# Patient Record
Sex: FEMALE | Race: WHITE | URBAN - METROPOLITAN AREA
[De-identification: names, ages, dates, MRNs, and addresses within clinical notes are randomized per-mention and may not be internally consistent; named-entity substitution may affect disease eponyms.]

---

## 2017-12-16 ENCOUNTER — TRANSFERRED RECORDS (OUTPATIENT)
Dept: HEALTH INFORMATION MANAGEMENT | Facility: CLINIC | Age: 27
End: 2017-12-16

## 2017-12-16 ENCOUNTER — HOSPITAL ENCOUNTER (INPATIENT)
Facility: CLINIC | Age: 27
LOS: 6 days | Discharge: HOME | End: 2017-12-22
Attending: PSYCHIATRY & NEUROLOGY | Admitting: PSYCHIATRY & NEUROLOGY
Payer: MEDICAID

## 2017-12-16 ENCOUNTER — APPOINTMENT (OUTPATIENT)
Dept: GENERAL RADIOLOGY | Facility: CLINIC | Age: 27
End: 2017-12-16
Attending: STUDENT IN AN ORGANIZED HEALTH CARE EDUCATION/TRAINING PROGRAM
Payer: MEDICAID

## 2017-12-16 ENCOUNTER — ALLIED HEALTH/NURSE VISIT (OUTPATIENT)
Dept: NEUROLOGY | Facility: CLINIC | Age: 27
End: 2017-12-16
Attending: PSYCHIATRY & NEUROLOGY
Payer: MEDICAID

## 2017-12-16 DIAGNOSIS — F19.20: ICD-10-CM

## 2017-12-16 DIAGNOSIS — J15.4 STREPTOCOCCAL PNEUMONIA (H): ICD-10-CM

## 2017-12-16 DIAGNOSIS — G40.901 STATUS EPILEPTICUS, GENERALIZED CONVULSIVE (H): Primary | ICD-10-CM

## 2017-12-16 DIAGNOSIS — G47.00 PERSISTENT DISORDER OF INITIATING OR MAINTAINING SLEEP: ICD-10-CM

## 2017-12-16 DIAGNOSIS — G40.901 STATUS EPILEPTICUS, GENERALIZED CONVULSIVE (H): ICD-10-CM

## 2017-12-16 DIAGNOSIS — J96.21 ACUTE ON CHRONIC RESPIRATORY FAILURE WITH HYPOXIA (H): Primary | ICD-10-CM

## 2017-12-16 LAB
ANION GAP SERPL CALCULATED.3IONS-SCNC: 10 MMOL/L (ref 3–14)
APTT PPP: 25 SEC (ref 22–37)
BUN SERPL-MCNC: 9 MG/DL (ref 7–30)
CALCIUM SERPL-MCNC: 7.7 MG/DL (ref 8.5–10.1)
CHLORIDE SERPL-SCNC: 109 MMOL/L (ref 94–109)
CO2 SERPL-SCNC: 21 MMOL/L (ref 20–32)
CREAT SERPL-MCNC: 0.61 MG/DL (ref 0.52–1.04)
ERYTHROCYTE [DISTWIDTH] IN BLOOD BY AUTOMATED COUNT: 12.7 % (ref 10–15)
GFR SERPL CREATININE-BSD FRML MDRD: >90 ML/MIN/1.7M2
GLUCOSE SERPL-MCNC: 98 MG/DL (ref 70–99)
HBA1C MFR BLD: 5.1 % (ref 4.3–6)
HCT VFR BLD AUTO: 37 % (ref 35–47)
HGB BLD-MCNC: 12.6 G/DL (ref 11.7–15.7)
INR PPP: 1.04 (ref 0.86–1.14)
MCH RBC QN AUTO: 31.8 PG (ref 26.5–33)
MCHC RBC AUTO-ENTMCNC: 34.1 G/DL (ref 31.5–36.5)
MCV RBC AUTO: 93 FL (ref 78–100)
PLATELET # BLD AUTO: 226 10E9/L (ref 150–450)
POTASSIUM SERPL-SCNC: 3.5 MMOL/L (ref 3.4–5.3)
RBC # BLD AUTO: 3.96 10E12/L (ref 3.8–5.2)
SODIUM SERPL-SCNC: 140 MMOL/L (ref 133–144)
TROPONIN I SERPL-MCNC: <0.015 UG/L (ref 0–0.04)
WBC # BLD AUTO: 16.8 10E9/L (ref 4–11)

## 2017-12-16 PROCEDURE — 94002 VENT MGMT INPAT INIT DAY: CPT

## 2017-12-16 PROCEDURE — 85610 PROTHROMBIN TIME: CPT | Performed by: STUDENT IN AN ORGANIZED HEALTH CARE EDUCATION/TRAINING PROGRAM

## 2017-12-16 PROCEDURE — 85730 THROMBOPLASTIN TIME PARTIAL: CPT | Performed by: STUDENT IN AN ORGANIZED HEALTH CARE EDUCATION/TRAINING PROGRAM

## 2017-12-16 PROCEDURE — 93005 ELECTROCARDIOGRAM TRACING: CPT

## 2017-12-16 PROCEDURE — 87640 STAPH A DNA AMP PROBE: CPT | Performed by: STUDENT IN AN ORGANIZED HEALTH CARE EDUCATION/TRAINING PROGRAM

## 2017-12-16 PROCEDURE — 83036 HEMOGLOBIN GLYCOSYLATED A1C: CPT | Performed by: STUDENT IN AN ORGANIZED HEALTH CARE EDUCATION/TRAINING PROGRAM

## 2017-12-16 PROCEDURE — 86900 BLOOD TYPING SEROLOGIC ABO: CPT | Performed by: STUDENT IN AN ORGANIZED HEALTH CARE EDUCATION/TRAINING PROGRAM

## 2017-12-16 PROCEDURE — 80048 BASIC METABOLIC PNL TOTAL CA: CPT | Performed by: STUDENT IN AN ORGANIZED HEALTH CARE EDUCATION/TRAINING PROGRAM

## 2017-12-16 PROCEDURE — 40000275 ZZH STATISTIC RCP TIME EA 10 MIN

## 2017-12-16 PROCEDURE — 25000128 H RX IP 250 OP 636: Performed by: STUDENT IN AN ORGANIZED HEALTH CARE EDUCATION/TRAINING PROGRAM

## 2017-12-16 PROCEDURE — 40000986 XR ABDOMEN PORT F1 VW

## 2017-12-16 PROCEDURE — 86850 RBC ANTIBODY SCREEN: CPT | Performed by: STUDENT IN AN ORGANIZED HEALTH CARE EDUCATION/TRAINING PROGRAM

## 2017-12-16 PROCEDURE — 87641 MR-STAPH DNA AMP PROBE: CPT | Performed by: STUDENT IN AN ORGANIZED HEALTH CARE EDUCATION/TRAINING PROGRAM

## 2017-12-16 PROCEDURE — 95951 ZZHC EEG VIDEO < 12 HR: CPT | Mod: 52,ZF

## 2017-12-16 PROCEDURE — 25000125 ZZHC RX 250: Performed by: STUDENT IN AN ORGANIZED HEALTH CARE EDUCATION/TRAINING PROGRAM

## 2017-12-16 PROCEDURE — 20000004 ZZH R&B ICU UMMC

## 2017-12-16 PROCEDURE — 5A1945Z RESPIRATORY VENTILATION, 24-96 CONSECUTIVE HOURS: ICD-10-PCS | Performed by: PSYCHIATRY & NEUROLOGY

## 2017-12-16 PROCEDURE — 36415 COLL VENOUS BLD VENIPUNCTURE: CPT | Performed by: STUDENT IN AN ORGANIZED HEALTH CARE EDUCATION/TRAINING PROGRAM

## 2017-12-16 PROCEDURE — 84484 ASSAY OF TROPONIN QUANT: CPT | Performed by: STUDENT IN AN ORGANIZED HEALTH CARE EDUCATION/TRAINING PROGRAM

## 2017-12-16 PROCEDURE — 86901 BLOOD TYPING SEROLOGIC RH(D): CPT | Performed by: STUDENT IN AN ORGANIZED HEALTH CARE EDUCATION/TRAINING PROGRAM

## 2017-12-16 PROCEDURE — 93010 ELECTROCARDIOGRAM REPORT: CPT | Mod: 77 | Performed by: INTERNAL MEDICINE

## 2017-12-16 PROCEDURE — 85027 COMPLETE CBC AUTOMATED: CPT | Performed by: STUDENT IN AN ORGANIZED HEALTH CARE EDUCATION/TRAINING PROGRAM

## 2017-12-16 RX ORDER — PROPOFOL 10 MG/ML
5-75 INJECTION, EMULSION INTRAVENOUS CONTINUOUS
Status: DISCONTINUED | OUTPATIENT
Start: 2017-12-16 | End: 2017-12-16

## 2017-12-16 RX ORDER — HEPARIN SODIUM 5000 [USP'U]/.5ML
5000 INJECTION, SOLUTION INTRAVENOUS; SUBCUTANEOUS EVERY 12 HOURS
Status: DISCONTINUED | OUTPATIENT
Start: 2017-12-16 | End: 2017-12-22 | Stop reason: HOSPADM

## 2017-12-16 RX ORDER — PROPOFOL 10 MG/ML
INJECTION, EMULSION INTRAVENOUS
Status: DISCONTINUED
Start: 2017-12-16 | End: 2017-12-21 | Stop reason: HOSPADM

## 2017-12-16 RX ORDER — POTASSIUM CHLORIDE 29.8 MG/ML
20 INJECTION INTRAVENOUS
Status: DISCONTINUED | OUTPATIENT
Start: 2017-12-16 | End: 2017-12-22 | Stop reason: HOSPADM

## 2017-12-16 RX ORDER — POTASSIUM CL/LIDO/0.9 % NACL 10MEQ/0.1L
10 INTRAVENOUS SOLUTION, PIGGYBACK (ML) INTRAVENOUS
Status: DISCONTINUED | OUTPATIENT
Start: 2017-12-16 | End: 2017-12-22 | Stop reason: HOSPADM

## 2017-12-16 RX ORDER — NALOXONE HYDROCHLORIDE 0.4 MG/ML
.1-.4 INJECTION, SOLUTION INTRAMUSCULAR; INTRAVENOUS; SUBCUTANEOUS
Status: DISCONTINUED | OUTPATIENT
Start: 2017-12-16 | End: 2017-12-22 | Stop reason: HOSPADM

## 2017-12-16 RX ORDER — MAGNESIUM SULFATE HEPTAHYDRATE 40 MG/ML
4 INJECTION, SOLUTION INTRAVENOUS EVERY 4 HOURS PRN
Status: DISCONTINUED | OUTPATIENT
Start: 2017-12-16 | End: 2017-12-22 | Stop reason: HOSPADM

## 2017-12-16 RX ORDER — PROPOFOL 10 MG/ML
5-50 INJECTION, EMULSION INTRAVENOUS CONTINUOUS
Status: DISCONTINUED | OUTPATIENT
Start: 2017-12-16 | End: 2017-12-19

## 2017-12-16 RX ORDER — LABETALOL HYDROCHLORIDE 5 MG/ML
10 INJECTION, SOLUTION INTRAVENOUS EVERY 10 MIN PRN
Status: DISCONTINUED | OUTPATIENT
Start: 2017-12-16 | End: 2017-12-22 | Stop reason: HOSPADM

## 2017-12-16 RX ORDER — AMOXICILLIN 250 MG
1 CAPSULE ORAL AT BEDTIME
Status: DISCONTINUED | OUTPATIENT
Start: 2017-12-16 | End: 2017-12-22 | Stop reason: HOSPADM

## 2017-12-16 RX ORDER — LEVETIRACETAM 15 MG/ML
1500 INJECTION INTRAVASCULAR EVERY 12 HOURS
Status: DISCONTINUED | OUTPATIENT
Start: 2017-12-16 | End: 2017-12-21

## 2017-12-16 RX ORDER — POTASSIUM CHLORIDE 7.45 MG/ML
10 INJECTION INTRAVENOUS
Status: DISCONTINUED | OUTPATIENT
Start: 2017-12-16 | End: 2017-12-22 | Stop reason: HOSPADM

## 2017-12-16 RX ORDER — POTASSIUM CHLORIDE 1.5 G/1.58G
20-40 POWDER, FOR SOLUTION ORAL
Status: DISCONTINUED | OUTPATIENT
Start: 2017-12-16 | End: 2017-12-22 | Stop reason: HOSPADM

## 2017-12-16 RX ORDER — POTASSIUM CHLORIDE 750 MG/1
20-40 TABLET, EXTENDED RELEASE ORAL
Status: DISCONTINUED | OUTPATIENT
Start: 2017-12-16 | End: 2017-12-22 | Stop reason: HOSPADM

## 2017-12-16 RX ORDER — SODIUM CHLORIDE 9 MG/ML
INJECTION, SOLUTION INTRAVENOUS CONTINUOUS
Status: DISCONTINUED | OUTPATIENT
Start: 2017-12-16 | End: 2017-12-22 | Stop reason: HOSPADM

## 2017-12-16 RX ADMIN — PANTOPRAZOLE SODIUM 40 MG: 40 INJECTION, POWDER, FOR SOLUTION INTRAVENOUS at 20:27

## 2017-12-16 RX ADMIN — PROPOFOL 75 MCG/KG/MIN: 10 INJECTION, EMULSION INTRAVENOUS at 22:40

## 2017-12-16 RX ADMIN — LEVETIRACETAM 1500 MG: 1500 INJECTION, SOLUTION INTRAVENOUS at 20:17

## 2017-12-16 RX ADMIN — PROPOFOL 75 MCG/KG/MIN: 10 INJECTION, EMULSION INTRAVENOUS at 20:17

## 2017-12-16 RX ADMIN — SODIUM CHLORIDE: 9 INJECTION, SOLUTION INTRAVENOUS at 20:17

## 2017-12-16 RX ADMIN — HEPARIN SODIUM 5000 UNITS: 5000 INJECTION, SOLUTION INTRAVENOUS; SUBCUTANEOUS at 20:27

## 2017-12-16 ASSESSMENT — VISUAL ACUITY
OU: OTHER (SEE COMMENT)

## 2017-12-16 NOTE — IP AVS SNAPSHOT
[address] [address]    Phone:  [phone]                                       After Visit Summary   12/16/2017    Bobby Howard    MRN: [id number]           After Visit Summary Signature Page     I have received my discharge instructions, and my questions have been answered. I have discussed any challenges I see with this plan with the nurse or doctor.    ..........................................................................................................................................  Patient/Patient Representative Signature      ..........................................................................................................................................  Patient Representative Print Name and Relationship to Patient    ..................................................               ................................................  Date                                            Time    ..........................................................................................................................................  Reviewed by Signature/Title    ...................................................              ..............................................  Date                                                            Time

## 2017-12-16 NOTE — IP AVS SNAPSHOT
MRN:[id number]                      After Visit Summary   12/16/2017    oBbby Howard    MRN: [id number]           Thank you!     Thank you for choosing [facility] for your care. Our goal is always to provide you with excellent care. Hearing back from our patients is one way we can continue to improve our services. Please take a few minutes to complete the written survey that you may receive in the mail after you visit with us. Thank you!        Patient Information     Date Of Birth          1990        Designated Caregiver       Most Recent Value    Caregiver    Will someone help with your care after discharge? yes    Name of designated caregiver father Johnson    Phone number of caregiver [phone]    Caregiver address [address] [place] [address]      About your hospital stay     You were admitted on:  December 16, 2017 You last received care in the:  [address] [facility]    You were discharged on:  December 22, 2017        Reason for your hospital stay       Status Epilepticus                  Who to Call     For medical emergencies, please call 911.  For non-urgent questions about your medical care, please call your primary care provider or clinic, None          Attending Provider     Provider Specialty    Iglesia Her MD Neurology    [facility], Tyrone Holly MD Neurology       Primary Care Provider Fax #    Physician No Ref-Primary [phone]      After Care Instructions     Activity       Your activity upon discharge: activity as tolerated, but NO DRIVING for three months.            Diet       Follow this diet upon discharge: Orders Placed This Encounter      Regular Diet Adult            Discharge Instructions       Per [place] State Law, you may not drive for three months following a seizure. For you, that would be March 16, 2018. See your discharge summary for more details.                  Follow-up Appointments     [facility] Follow-up and recommended labs and tests        1. Follow up with your PCP within one week.  2. Follow up with neurology in 1-3 months.     Appointments on [place] and/or [facility] (with [facility] or [facility] provider or service). Call [phone] if you haven't heard regarding these appointments within 7 days of discharge.                  Additional Services     INTEGRATED PRIMARY CARE REFERRAL       Your provider has referred you to: Integrated Primary Care  [facility] - Integrated Primary Care serves adults 18 years and older that have complex medical issues with a mental health overlay that are difficult to manage well in a traditional primary care setting. Patients referred to IPC meet at least one of the following criteria:    - Complex medical issues with a mental health overlay  - Patients who are difficult to manage in the traditional primary care setting due to mental health issues    Referrals to Integrated [facility] (IPC) assume that the referring provider has discussed with the patient that the initial visit will be an establish care visit and Integrated [facility] will take over all primary care services at that visit. The current primary care provider will need to provide care for the patient until the patient has had their first visit at [facility].      Please complete the following questions to help us determine if your patient qualifies for our program:    Is your patient dealing with chronic pain? No    Is your patient on chronic narcotics? No    Does your patient have any chemical health issues? Yes.  If yes, have they been in a treatment program  No    Is your patient currently receiving psychiatric care? No    I have discussed this referral with my patient and they are agreeable to transfer their primary care to Integrated Primary Care. YES.    Please be aware that coverage of these services is subject to the terms and limitations of your health insurance plan.  Call member services at your health plan with any benefit or coverage  "questions.      Please bring the following to your appointment:  >>   Any x-rays, CTs or MRIs which have been performed.  Contact the facility where they were done to arrange for  prior to your scheduled appointment.  Any new CT, MRI or other procedures ordered by your specialist must be performed at a [place] facility or coordinated by your clinic's referral office.    >>   List of current medications   >>   This referral request   >>   Any documents/labs given to you for this referral            Neurology Adult Referral                 Pending Results     Date and Time Order Name Status Description    12/18/2017 1319 Urine Culture Aerobic Bacterial Preliminary     12/18/2017 1246 Blood culture Preliminary     12/18/2017 1246 Blood culture Preliminary     12/17/2017 1334 Cysticercosis antibody IgG CSF: Tube 3 In process     12/17/2017 1334 Fungus Culture, non-blood Preliminary     12/17/2017 1334 Anaerobic CSF culture Preliminary             Statement of Approval     Ordered          12/22/17 1132  I have reviewed and agree with all the recommendations and orders detailed in this document.  EFFECTIVE NOW     Approved and electronically signed by:  Patti Benjamin DO             Admission Information     Date & Time Provider Department Dept. Phone    12/16/2017 Tyrone Villafana MD [address] [facility] [phone]      Your Vitals Were     Blood Pressure Pulse Temperature Respirations Height Weight    115/71 80 97.8  F (36.6  C) (Oral) 16 1.626 m (5' 4\") 61 kg (134 lb 7.7 oz)    Pulse Oximetry BMI (Body Mass Index)                97% 23.08 kg/m2          MyChart Information     [facility] lets you send messages to your doctor, view your test results, renew your prescriptions, schedule appointments and more. To sign up, go to [email or web address] . Click on \"Log in\" on the left side of the screen, which will take you to the Welcome page. Then click on \"Sign up Now\" on the right side of " the page.     You will be asked to enter the access code listed below, as well as some personal information. Please follow the directions to create your username and password.     Your access code is: [email or web address]  Expires: 3/16/2018 10:28 PM     Your access code will  in 90 days. If you need help or a new code, please call your [facility] clinic or [phone].        Care EveryWhere ID     This is your Care EveryWhere ID. This could be used by other organizations to access your [facility] medical records  [id number]        Equal Access to Services     [facility]: Hadii dick barreto hadevelia Daniel, wakendy connolly, qarobson kaaljerry zimmer, shana ruiz . So [facility] [phone].    ATENCIÓN: Si habla español, tiene a hairtson disposición servicios gratuitos de asistencia lingüística. Llame al [phone].    We comply with applicable federal civil rights laws and [place] laws. We do not discriminate on the basis of race, color, national origin, age, disability, sex, sexual orientation, or gender identity.               Review of your medicines      START taking        Dose / Directions    azithromycin 250 MG tablet   Commonly known as:  ZITHROMAX   Used for:  Streptococcal pneumonia (H)        Two tablets first day, then one tablet daily for four days.   Quantity:  3 tablet   Refills:  0       folic acid 1 MG tablet   Commonly known as:  FOLVITE   Used for:  Polysubstance dependence including opioid type drug with complication, continuous use (H)        Dose:  1 mg   Take 1 tablet (1 mg) by mouth daily   Quantity:  30 tablet   Refills:  0       levETIRAcetam 750 MG tablet   Commonly known as:  KEPPRA        Dose:  1500 mg   Take 2 tablets (1,500 mg) by mouth 2 times daily   Quantity:  60 tablet   Refills:  0       melatonin 3 MG tablet   Used for:  Persistent disorder of initiating or maintaining sleep        Dose:  3 mg   1 tablet (3 mg) by Oral or Feeding Tube route At Bedtime    Quantity:  30 tablet   Refills:  0       multivitamin, therapeutic Tabs tablet   Used for:  Polysubstance dependence including opioid type drug with complication, continuous use (H)        Dose:  1 tablet   Take 1 tablet by mouth daily   Quantity:  30 tablet   Refills:  0       thiamine 100 MG tablet   Used for:  Polysubstance dependence including opioid type drug with complication, continuous use (H)        Dose:  100 mg   Take 1 tablet (100 mg) by mouth daily   Quantity:  30 tablet   Refills:  0            Where to get your medicines      These medications were sent to [facility] - [place], [place] - [address]  [address] [address]     Phone:  [phone]     azithromycin 250 MG tablet    folic acid 1 MG tablet    levETIRAcetam 750 MG tablet    melatonin 3 MG tablet    multivitamin, therapeutic Tabs tablet    thiamine 100 MG tablet               ANTIBIOTIC INSTRUCTION     You've Been Prescribed an Antibiotic - Now What?  Your healthcare team thinks that you or your loved one might have an infection. Some infections can be treated with antibiotics, which are powerful, life-saving drugs. Like all medications, antibiotics have side effects and should only be used when necessary. There are some important things you should know about your antibiotic treatment.      Your healthcare team may run tests before you start taking an antibiotic.    Your team may take samples (e.g., from your blood, urine or other areas) to run tests to look for bacteria. These test can be important to determine if you need an antibiotic at all and, if you do, which antibiotic will work best.      Within a few days, your healthcare team might change or even stop your antibiotic.    Your team may start you on an antibiotic while they are working to find out what is making you sick.    Your team might change your antibiotic because test results show that a different antibiotic would be better to  treat your infection.    In some cases, once your team has more information, they learn that you do not need an antibiotic at all. They may find out that you don't have an infection, or that the antibiotic you're taking won't work against your infection. For example, an infection caused by a virus can't be treated with antibiotics. Staying on an antibiotic when you don't need it is more likely to be harmful than helpful.      You may experience side effects from your antibiotic.    Like all medications, antibiotics have side effects. Some of these can be serious.    Let you healthcare team know if you have any known allergies when you are admitted to the hospital.    One significant side effect of nearly all antibiotics is the risk of severe and sometimes deadly diarrhea caused by Clostridium difficile (C. Difficile). This occurs when a person takes antibiotics because some good germs are destroyed. Antibiotic use allows C. diificile to take over, putting patients at high risk for this serious infection.    As a patient or caregiver, it is important to understand your or your loved one's antibiotic treatment. It is especially important for caregivers to speak up when patients can't speak for themselves. Here are some important questions to ask your healthcare team.    What infection is this antibiotic treating and how do you know I have that infection?    What side effects might occur from this antibiotic?    How long will I need to take this antibiotic?    Is it safe to take this antibiotic with other medications or supplements (e.g., vitamins) that I am taking?     Are there any special directions I need to know about taking this antibiotic? For example, should I take it with food?    How will I be monitored to know whether my infection is responding to the antibiotic?    What tests may help to make sure the right antibiotic is prescribed for me?      Information provided by:  www.cdc.gov/getsmart  U.S. Department  of Health and Human Services  Centers for disease Control and Prevention  National Center for Emerging and Zoonotic Infectious Diseases  Division of Healthcare Quality Promotion         Protect others around you: Learn how to safely use, store and throw away your medicines at www.disposemymeds.org.             Medication List: This is a list of all your medications and when to take them. Check marks below indicate your daily home schedule. Keep this list as a reference.      Medications           Morning Afternoon Evening Bedtime As Needed    azithromycin 250 MG tablet   Commonly known as:  ZITHROMAX   Two tablets first day, then one tablet daily for four days.                                folic acid 1 MG tablet   Commonly known as:  FOLVITE   Take 1 tablet (1 mg) by mouth daily   Last time this was given:  1 mg on 12/22/2017  8:12 AM                                levETIRAcetam 750 MG tablet   Commonly known as:  KEPPRA   Take 2 tablets (1,500 mg) by mouth 2 times daily   Last time this was given:  1,500 mg on 12/22/2017  8:12 AM                                melatonin 3 MG tablet   1 tablet (3 mg) by Oral or Feeding Tube route At Bedtime   Last time this was given:  3 mg on 12/21/2017  7:57 PM                                multivitamin, therapeutic Tabs tablet   Take 1 tablet by mouth daily   Last time this was given:  1 tablet on 12/22/2017  8:12 AM                                thiamine 100 MG tablet   Take 1 tablet (100 mg) by mouth daily   Last time this was given:  100 mg on 12/22/2017  8:12 AM

## 2017-12-16 NOTE — MR AVS SNAPSHOT
After Visit Summary   12/16/2017    Bobby Howard    MRN: [id number]           Patient Information     Date Of Birth          1990        Visit Information        Provider Department      12/16/2017 7:00 PM [facility] EEG TECH 4 [facility]        Today's Diagnoses     Status epilepticus, generalized convulsive (H)    -  1       Follow-ups after your visit        Your next 10 appointments already scheduled     Dec 17, 2017  7:00 AM CST   24 Hour Video Visit with [facility] EEG TECH 4   [facility] ([facility])    [facility]  [address] [id number] [phone]           [place]: Your appointment is scheduled at [facility]. [address] [place] [address]              Future tests that were ordered for you today     Open Standing Orders        Priority Remaining Interval Expires Ordered    Phosphorus Routine 100/100 CONDITIONAL (SPECIFY)  12/16/2017    Potassium Routine 100/100 CONDITIONAL (SPECIFY)  12/16/2017    Magnesium Routine 100/100 CONDITIONAL (SPECIFY)  12/16/2017    Daily weights Routine 1/1 DAILY  12/16/2017    Platelet count Timed 6/6 EVERY 72 HOURS  12/16/2017            Who to contact     Please call your clinic at [phone] to:    Ask questions about your health    Make or cancel appointments    Discuss your medicines    Learn about your test results    Speak to your doctor   If you have compliments or concerns about an experience at your clinic, or if you wish to file a complaint, please contact [facility] Patient Relations at [phone] or email us at [email or web address]         Additional Information About Your Visit        MyChart Information     [facility] is an electronic gateway that provides easy, online access to your medical records. With [facility], you can request a clinic appointment, read your test results, renew a prescription or communicate with  your care team.     To sign up for MyChart visit the website at [email or web address]   You will be asked to enter the access code listed below, as well as some personal information. Please follow the directions to create your username and password.     Your access code is: [email or web address]  Expires: 3/16/2018 10:28 PM     Your access code will  in 90 days. If you need help or a new code, please contact your [facility] or call [phone] for assistance.        Care EveryWhere ID     This is your Care EveryWhere ID. This could be used by other organizations to access your [facility] medical records  [id number]         Blood Pressure from Last 3 Encounters:   17 116/67    Weight from Last 3 Encounters:   17 63.5 kg (139 lb 15.9 oz)              Today, you had the following     No orders found for display         Today's Medication Changes      Notice     This visit is during an admission. Changes to the med list made in this visit will be reflected in the After Visit Summary of the admission.             Primary Care Provider    None Specified       No primary provider on file.        Equal Access to Services     [facility] : Hadbalaji Daniel, sabrina connolly, nelly zimmer, shana ruiz . So [facility] [phone].    ATENCIÓN: Si habla español, tiene a hairston disposición servicios gratuitos de asistencia lingüística. Llame al [phone].    We comply with applicable federal civil rights laws and [place] laws. We do not discriminate on the basis of race, color, national origin, age, disability, sex, sexual orientation, or gender identity.            Thank you!     Thank you for choosing [facility]  for your care. Our goal is always to provide you with excellent care. Hearing back from our patients is one way we can continue to improve our services. Please take a few minutes to complete the written survey that you may  receive in the mail after your visit with us. Thank you!             Your Updated Medication List - Protect others around you: Learn how to safely use, store and throw away your medicines at www.disposemymeds.org.      Notice     This visit is during an admission. Changes to the med list made in this visit will be reflected in the After Visit Summary of the admission.

## 2017-12-17 ENCOUNTER — APPOINTMENT (OUTPATIENT)
Dept: MRI IMAGING | Facility: CLINIC | Age: 27
End: 2017-12-17
Attending: INTERNAL MEDICINE
Payer: MEDICAID

## 2017-12-17 ENCOUNTER — ALLIED HEALTH/NURSE VISIT (OUTPATIENT)
Dept: NEUROLOGY | Facility: CLINIC | Age: 27
End: 2017-12-17
Attending: PSYCHIATRY & NEUROLOGY
Payer: MEDICAID

## 2017-12-17 DIAGNOSIS — R56.9 SEIZURES (H): Primary | ICD-10-CM

## 2017-12-17 LAB
ABO + RH BLD: NORMAL
APPEARANCE CSF: CLEAR
BASE DEFICIT BLDA-SCNC: 0.7 MMOL/L
BLD GP AB SCN SERPL QL: NORMAL
BLOOD BANK CMNT PATIENT-IMP: NORMAL
BLOOD BANK CMNT PATIENT-IMP: NORMAL
CHOLEST SERPL-MCNC: 142 MG/DL
CK SERPL-CCNC: 268 U/L (ref 30–225)
COLOR CSF: COLORLESS
CRYPTOC AG SPEC QL: NORMAL
EV RNA SPEC QL NAA+PROBE: NEGATIVE
GLUCOSE BLDC GLUCOMTR-MCNC: 99 MG/DL (ref 70–99)
GLUCOSE CSF-MCNC: 61 MG/DL (ref 40–70)
GRAM STN SPEC: NORMAL
GRAM STN SPEC: NORMAL
HAEM INFLU A AG SPEC QL: NORMAL
HCO3 BLD-SCNC: 22 MMOL/L (ref 21–28)
HDLC SERPL-MCNC: 34 MG/DL
INDIA INK PREP SPEC: NORMAL
LDLC SERPL CALC-MCNC: 71 MG/DL
MRSA DNA SPEC QL NAA+PROBE: NEGATIVE
N MEN SG A+Y AG SPEC QL LA: NORMAL
N MEN SG B+E COLI K1 AG SPEC QL LA: NORMAL
N MEN SG C+W135 AG SPEC QL LA: NORMAL
NONHDLC SERPL-MCNC: 109 MG/DL
O2/TOTAL GAS SETTING VFR VENT: 40 %
PCO2 BLD: 29 MM HG (ref 35–45)
PH BLD: 7.49 PH (ref 7.35–7.45)
PLATELET # BLD AUTO: 197 10E9/L (ref 150–450)
PLATELET # BLD AUTO: NORMAL 10E9/L (ref 150–450)
PO2 BLD: 75 MM HG (ref 80–105)
PROT CSF-MCNC: 33 MG/DL (ref 15–60)
RBC # CSF MANUAL: 5 /UL (ref 0–2)
RBC # CSF MANUAL: NORMAL /UL (ref 0–2)
SPECIMEN EXP DATE BLD: NORMAL
SPECIMEN EXP DATE BLD: NORMAL
SPECIMEN SOURCE: NORMAL
TRIGL SERPL-MCNC: 188 MG/DL
TUBE # CSF: 4 #
WBC # CSF MANUAL: 3 /UL (ref 0–5)
WBC # CSF MANUAL: NORMAL /UL (ref 0–5)

## 2017-12-17 PROCEDURE — 87205 SMEAR GRAM STAIN: CPT | Performed by: INTERNAL MEDICINE

## 2017-12-17 PROCEDURE — 86403 PARTICLE AGGLUT ANTBDY SCRN: CPT | Performed by: INTERNAL MEDICINE

## 2017-12-17 PROCEDURE — 94003 VENT MGMT INPAT SUBQ DAY: CPT

## 2017-12-17 PROCEDURE — 25000132 ZZH RX MED GY IP 250 OP 250 PS 637: Performed by: STUDENT IN AN ORGANIZED HEALTH CARE EDUCATION/TRAINING PROGRAM

## 2017-12-17 PROCEDURE — 89050 BODY FLUID CELL COUNT: CPT | Performed by: INTERNAL MEDICINE

## 2017-12-17 PROCEDURE — 82040 ASSAY OF SERUM ALBUMIN: CPT | Performed by: INTERNAL MEDICINE

## 2017-12-17 PROCEDURE — 20000004 ZZH R&B ICU UMMC

## 2017-12-17 PROCEDURE — 25000125 ZZHC RX 250: Performed by: STUDENT IN AN ORGANIZED HEALTH CARE EDUCATION/TRAINING PROGRAM

## 2017-12-17 PROCEDURE — 87075 CULTR BACTERIA EXCEPT BLOOD: CPT | Performed by: INTERNAL MEDICINE

## 2017-12-17 PROCEDURE — 85049 AUTOMATED PLATELET COUNT: CPT | Performed by: PSYCHIATRY & NEUROLOGY

## 2017-12-17 PROCEDURE — 86789 WEST NILE VIRUS ANTIBODY: CPT | Performed by: INTERNAL MEDICINE

## 2017-12-17 PROCEDURE — 87581 M.PNEUMON DNA AMP PROBE: CPT | Performed by: INTERNAL MEDICINE

## 2017-12-17 PROCEDURE — 84157 ASSAY OF PROTEIN OTHER: CPT | Performed by: INTERNAL MEDICINE

## 2017-12-17 PROCEDURE — 36415 COLL VENOUS BLD VENIPUNCTURE: CPT | Performed by: STUDENT IN AN ORGANIZED HEALTH CARE EDUCATION/TRAINING PROGRAM

## 2017-12-17 PROCEDURE — 95951 ZZHC EEG VIDEO EACH 24 HR: CPT | Mod: ZF

## 2017-12-17 PROCEDURE — 84478 ASSAY OF TRIGLYCERIDES: CPT | Performed by: PSYCHIATRY & NEUROLOGY

## 2017-12-17 PROCEDURE — 25000128 H RX IP 250 OP 636: Performed by: STUDENT IN AN ORGANIZED HEALTH CARE EDUCATION/TRAINING PROGRAM

## 2017-12-17 PROCEDURE — 86682 HELMINTH ANTIBODY: CPT | Performed by: INTERNAL MEDICINE

## 2017-12-17 PROCEDURE — 87210 SMEAR WET MOUNT SALINE/INK: CPT | Performed by: INTERNAL MEDICINE

## 2017-12-17 PROCEDURE — 25000128 H RX IP 250 OP 636: Performed by: RADIOLOGY

## 2017-12-17 PROCEDURE — 87529 HSV DNA AMP PROBE: CPT | Performed by: INTERNAL MEDICINE

## 2017-12-17 PROCEDURE — 70553 MRI BRAIN STEM W/O & W/DYE: CPT

## 2017-12-17 PROCEDURE — 82550 ASSAY OF CK (CPK): CPT | Performed by: STUDENT IN AN ORGANIZED HEALTH CARE EDUCATION/TRAINING PROGRAM

## 2017-12-17 PROCEDURE — 87498 ENTEROVIRUS PROBE&REVRS TRNS: CPT | Performed by: INTERNAL MEDICINE

## 2017-12-17 PROCEDURE — 86901 BLOOD TYPING SEROLOGIC RH(D): CPT | Performed by: PSYCHIATRY & NEUROLOGY

## 2017-12-17 PROCEDURE — 25000128 H RX IP 250 OP 636

## 2017-12-17 PROCEDURE — 80061 LIPID PANEL: CPT | Performed by: STUDENT IN AN ORGANIZED HEALTH CARE EDUCATION/TRAINING PROGRAM

## 2017-12-17 PROCEDURE — 40000275 ZZH STATISTIC RCP TIME EA 10 MIN

## 2017-12-17 PROCEDURE — 87070 CULTURE OTHR SPECIMN AEROBIC: CPT | Performed by: INTERNAL MEDICINE

## 2017-12-17 PROCEDURE — 36415 COLL VENOUS BLD VENIPUNCTURE: CPT | Performed by: PSYCHIATRY & NEUROLOGY

## 2017-12-17 PROCEDURE — 86788 WEST NILE VIRUS AB IGM: CPT | Performed by: INTERNAL MEDICINE

## 2017-12-17 PROCEDURE — 27210429 ZZH NUTRITION PRODUCT INTERMEDIATE LITER

## 2017-12-17 PROCEDURE — 87798 DETECT AGENT NOS DNA AMP: CPT | Performed by: INTERNAL MEDICINE

## 2017-12-17 PROCEDURE — 87102 FUNGUS ISOLATION CULTURE: CPT | Performed by: INTERNAL MEDICINE

## 2017-12-17 PROCEDURE — 82042 OTHER SOURCE ALBUMIN QUAN EA: CPT | Performed by: INTERNAL MEDICINE

## 2017-12-17 PROCEDURE — 87799 DETECT AGENT NOS DNA QUANT: CPT | Performed by: INTERNAL MEDICINE

## 2017-12-17 PROCEDURE — 82803 BLOOD GASES ANY COMBINATION: CPT

## 2017-12-17 PROCEDURE — 82784 ASSAY IGA/IGD/IGG/IGM EACH: CPT | Performed by: INTERNAL MEDICINE

## 2017-12-17 PROCEDURE — 82945 GLUCOSE OTHER FLUID: CPT | Performed by: INTERNAL MEDICINE

## 2017-12-17 PROCEDURE — 009U3ZX DRAINAGE OF SPINAL CANAL, PERCUTANEOUS APPROACH, DIAGNOSTIC: ICD-10-PCS | Performed by: PSYCHIATRY & NEUROLOGY

## 2017-12-17 PROCEDURE — 36600 WITHDRAWAL OF ARTERIAL BLOOD: CPT

## 2017-12-17 PROCEDURE — 83916 OLIGOCLONAL BANDS: CPT | Performed by: INTERNAL MEDICINE

## 2017-12-17 PROCEDURE — A9585 GADOBUTROL INJECTION: HCPCS | Performed by: RADIOLOGY

## 2017-12-17 PROCEDURE — 87899 AGENT NOS ASSAY W/OPTIC: CPT | Performed by: INTERNAL MEDICINE

## 2017-12-17 PROCEDURE — 00000146 ZZHCL STATISTIC GLUCOSE BY METER IP

## 2017-12-17 PROCEDURE — 86900 BLOOD TYPING SEROLOGIC ABO: CPT | Performed by: PSYCHIATRY & NEUROLOGY

## 2017-12-17 RX ORDER — GADOBUTROL 604.72 MG/ML
0.1 INJECTION INTRAVENOUS ONCE
Status: COMPLETED | OUTPATIENT
Start: 2017-12-17 | End: 2017-12-17

## 2017-12-17 RX ORDER — LIDOCAINE HYDROCHLORIDE 10 MG/ML
INJECTION, SOLUTION EPIDURAL; INFILTRATION; INTRACAUDAL; PERINEURAL
Status: DISCONTINUED
Start: 2017-12-17 | End: 2017-12-21 | Stop reason: HOSPADM

## 2017-12-17 RX ADMIN — FENTANYL CITRATE 25 MCG/HR: 50 INJECTION, SOLUTION INTRAMUSCULAR; INTRAVENOUS at 01:02

## 2017-12-17 RX ADMIN — PROPOFOL 70 MCG/KG/MIN: 10 INJECTION, EMULSION INTRAVENOUS at 02:43

## 2017-12-17 RX ADMIN — PROPOFOL 50 MCG/KG/MIN: 10 INJECTION, EMULSION INTRAVENOUS at 21:33

## 2017-12-17 RX ADMIN — LEVETIRACETAM 1500 MG: 1500 INJECTION, SOLUTION INTRAVENOUS at 20:30

## 2017-12-17 RX ADMIN — PROPOFOL 75 MCG/KG/MIN: 10 INJECTION, EMULSION INTRAVENOUS at 09:06

## 2017-12-17 RX ADMIN — HEPARIN SODIUM 5000 UNITS: 5000 INJECTION, SOLUTION INTRAVENOUS; SUBCUTANEOUS at 09:09

## 2017-12-17 RX ADMIN — PROPOFOL 65 MCG/KG/MIN: 10 INJECTION, EMULSION INTRAVENOUS at 05:05

## 2017-12-17 RX ADMIN — LEVETIRACETAM 1500 MG: 1500 INJECTION, SOLUTION INTRAVENOUS at 09:29

## 2017-12-17 RX ADMIN — HEPARIN SODIUM 5000 UNITS: 5000 INJECTION, SOLUTION INTRAVENOUS; SUBCUTANEOUS at 20:30

## 2017-12-17 RX ADMIN — SENNOSIDES AND DOCUSATE SODIUM 1 TABLET: 8.6; 5 TABLET ORAL at 22:33

## 2017-12-17 RX ADMIN — GADOBUTROL 6 ML: 604.72 INJECTION INTRAVENOUS at 15:16

## 2017-12-17 RX ADMIN — PANTOPRAZOLE SODIUM 40 MG: 40 INJECTION, POWDER, FOR SOLUTION INTRAVENOUS at 09:09

## 2017-12-17 ASSESSMENT — ACTIVITIES OF DAILY LIVING (ADL)
COGNITION: 1 - ATTENTION OR MEMORY DEFICITS
AMBULATION: 0-->INDEPENDENT
BATHING: 0-->INDEPENDENT
FALL_HISTORY_WITHIN_LAST_SIX_MONTHS: NO
RETIRED_COMMUNICATION: 2-->DIFFICULTY UNDERSTANDING (NOT RELATED TO LANGUAGE BARRIER)
SWALLOWING: 0-->SWALLOWS FOODS/LIQUIDS WITHOUT DIFFICULTY
TRANSFERRING: 0-->INDEPENDENT
DRESS: 0-->INDEPENDENT
RETIRED_EATING: 0-->INDEPENDENT
TOILETING: 0-->INDEPENDENT

## 2017-12-17 ASSESSMENT — VISUAL ACUITY
OU: OTHER (SEE COMMENT)

## 2017-12-17 NOTE — PLAN OF CARE
"Problem: Patient Care Overview  Goal: Plan of Care/Patient Progress Review  Status: intermittently combative and restless overnight; sedated at some points.  D/I: Patient on unit [address] Surgical/Neuro ICU s/p: multiple seizures and trips to 2 other outside hospitals before admit here 12/1617 for continuoud EEG.  Neuro- patient was very combative and restless prior to getting fentanyl added. Currently, sedated on propofol @65 & fentanyl @25. Bilateral wrist restraints to protect airway. Moves extremities spontaneously but not to command. Pupils equal and reactive. Continuous EEG. Hourly neuros.   CV- VSS, tachycardic overnight. Afebrile but patient was intermittently sweating overnight.   Pulm- lung sounds clear. Vent on CMV: fio2 40%, rate 16, PEEP 5, and tidal volume 450.   GI/- frausto placed at outside hospital; intact. OG imaged and ok to use; to low intermittent suction.   Skin- intact; tattoos and holes from piercing's.  Gtts- ns@75, propofol @65, and fentanyl @25.  Pain- fentanyl added; father saying patient has \"high pain tolerance\" and has been abusing drugs.   Electrolytes- replaced per protocol.  See flow sheets for further interventions and assessments.  P: Continue to monitor pt closely, Notify MD of changes/concerns.         "

## 2017-12-17 NOTE — PROGRESS NOTES
EEG CLINICAL NEUROPHYSIOLOGY PRELIMINARY REPORT    First 30 minutes of video-EEG reviewed. On propofol 75 ug/kg-min. Continuous diffuse alpha and beta with intermixed slowing. Poorly reactive. No periodic activity or ongoing seizures.    Results thus far consistent with level of anesthetic drips employed. Cannot exclude additional cerebral dysfunction separate from anesthetic effects. No malignant patterns, seizures, or evidence of status epilepticus thus far. Will continue to monitor. Full report to follow.    Kulwinder Ambrose MD  Pager [phone]

## 2017-12-17 NOTE — PLAN OF CARE
Problem: Patient Care Overview  Goal: Plan of Care/Patient Progress Review  OT/4AB:  OT orders received and acknowledged.  Per discussion with RN, pt not medically appropriate this AM for PT/OT evaluation.  Pt intubated and sedated, and becoming increasingly agitated with weaning of sedation.  Plan to check back this PM as schedule permits, otherwise reschedule.

## 2017-12-17 NOTE — MR AVS SNAPSHOT
After Visit Summary   12/17/2017    Bobby Howard    MRN: [id number]           Patient Information     Date Of Birth          1990        Visit Information        Provider Department      12/17/2017 7:00 AM [facility] EEG TECH 4 [facility] EEG        Today's Diagnoses     Seizures (H)    -  1       Follow-ups after your visit        Your next 10 appointments already scheduled     Dec 18, 2017  7:00 AM CST   24 Hour Video Visit with [facility] EEG TECH 4   [facility] EEG ([facility])    [facility]  [address] [id number] [phone]           [place]: Your appointment is scheduled at [facility]. [address] [place] [address]              Future tests that were ordered for you today     Open Standing Orders        Priority Remaining Interval Expires Ordered    CK total Routine 1/1 AM DRAW  12/17/2017    Triglycerides Routine 1/1 AM DRAW  12/17/2017    Phosphorus Routine 100/100 CONDITIONAL (SPECIFY)  12/16/2017    Potassium Routine 100/100 CONDITIONAL (SPECIFY)  12/16/2017    Magnesium Routine 100/100 CONDITIONAL (SPECIFY)  12/16/2017    Platelet count Timed 5/6 EVERY 72 HOURS  12/16/2017            Who to contact     Please call your clinic at [phone] to:    Ask questions about your health    Make or cancel appointments    Discuss your medicines    Learn about your test results    Speak to your doctor   If you have compliments or concerns about an experience at your clinic, or if you wish to file a complaint, please contact [facility] Patient Relations at [phone] or email us at [email or web address]         Additional Information About Your Visit        MyChart Information     Corpora is an electronic gateway that provides easy, online access to your medical records. With Corpora, you can request a clinic appointment, read your test results, renew a prescription or  communicate with your care team.     To sign up for [employer] visit the website at [email or web address]   You will be asked to enter the access code listed below, as well as some personal information. Please follow the directions to create your username and password.     Your access code is: [email or web address]  Expires: 3/16/2018 10:28 PM     Your access code will  in 90 days. If you need help or a new code, please contact your [facility] or call [phone] for assistance.        Care EveryWhere ID     This is your Care EveryWhere ID. This could be used by other organizations to access your [place] medical records  [id number]         Blood Pressure from Last 3 Encounters:   17 114/60    Weight from Last 3 Encounters:   17 63.5 kg (139 lb 15.9 oz)              Today, you had the following     No orders found for display         Today's Medication Changes      Notice     This visit is during an admission. Changes to the med list made in this visit will be reflected in the After Visit Summary of the admission.             Primary Care Provider    None Specified       No primary provider on file.        Equal Access to Services     RADHA RESENDEZ : Hadbalaji Daniel, sabrina connolly, nelly zimmer, shana ruiz . So [facility] [phone].    ATENCIÓN: Si habla español, tiene a hairston disposición servicios gratuitos de asistencia lingüística. Llame al [phone].    We comply with applicable federal civil rights laws and [place] laws. We do not discriminate on the basis of race, color, national origin, age, disability, sex, sexual orientation, or gender identity.            Thank you!     Thank you for choosing [facility]  for your care. Our goal is always to provide you with excellent care. Hearing back from our patients is one way we can continue to improve our services. Please take a few minutes to complete the written  survey that you may receive in the mail after your visit with us. Thank you!             Your Updated Medication List - Protect others around you: Learn how to safely use, store and throw away your medicines at www.disposemymeds.org.      Notice     This visit is during an admission. Changes to the med list made in this visit will be reflected in the After Visit Summary of the admission.

## 2017-12-17 NOTE — PLAN OF CARE
Problem: Patient Care Overview  Goal: Plan of Care/Patient Progress Review  SLP consult received. The patient is currently intubated and not appropriate to participate with SLP. SLP is signing off on the order, please reconsult when extubated and appropriate to participate.

## 2017-12-17 NOTE — PROCEDURES
EEG#:  -1      DATE OF STUDY:  12/16/2017       TYPE OF STUDY:  Inpatient video EEG monitoring.      DURATION OF RECORDING:  3 hours 7 minutes 8 seconds.      HISTORY:  Day 1 of video EEG monitoring in patient, Bobby Howard, a 27-year-old admitted for ongoing treatment of her status epilepticus.  She is referred from [facility] where status epilepticus apparently was reportedly diagnosed and treated.  She presents on propofol drip.  Additionally, she is being treated with levetiracetam 7500 mg per day.        TECHNICAL SUMMARY:  EEG is recorded from scalp electrodes applied according to the 10-20 international system.  Additional electrodes are utilized for referencing, artifact detection, and recording of other cerebral regions.  Video was continuously recorded.  Video was reviewed for clinical correlation and to assist with EEG interpretation.      FINDINGS:  Propofol 75 mcg/kilogram per minute was administered throughout.  Near continuous 18 Hz activity is seen diffusely but predominates in the central regions.  There is underlying irregular delta activity.  Stimulation is performed following ICU protocol.  This does cause some transient attenuations when physical stimulation is delivered (21:02:43).  Somewhat more delta is also seen when this occurs.  More passive sorts of stimulation did not have this effect.  Normal features of sleep were not recorded.        OTHER INTERICTAL ABNORMALITIES:  No epileptiform discharges.      ICTAL ABNORMALITIES:  No electrographic seizures.      IMPRESSION:  Abnormal.  A diffuse beta pattern with underlying delta is noted, consistent with a moderate to severe diffuse encephalopathy.  Administration of propofol at the rates used in this study often produces a pattern of this sort in patients without neurologic injury.  Underlying cerebral dysfunction separate from anesthetic effects cannot to be excluded, but malignant patterns are not seen.  Epileptiform activity  or seizures were not seen either but propofol at the doses employed here will often suppress both seizures and epileptiform activity.         HELLEN BUCKNER MD             D: 2017 14:35   T: 2017 16:12   MT: JATIN      Name:     NAEEM OLIVARES   MRN:      [id number]        Account:        [id number]   :      1990           Procedure Date: 2017      Document: [id number]

## 2017-12-17 NOTE — PLAN OF CARE
Problem: Patient Care Overview  Goal: Plan of Care/Patient Progress Review  PT 4A: PT orders received. Per discussion with RN, pt not appropriate for therapies this morning, agitated when off of sedation. PT will check back as time allows or reschedule for tomorrow.

## 2017-12-17 NOTE — PROGRESS NOTES
"[facility]    NEUROCRITICAL CARE PROGRESS NOTE    Patient Name:  Bobby Howard       Date of Admission:  12/16/2017       Problem List    Active Hospital Problems    Status epilepticus, generalized convulsive (H)       PMH:  Polysubstance abuse- methamphetamine and opioids  Status epilepticus  Rapidly progressive cognitive problems    Past Surgical History   No past surgical history on file.    Family History   No family history on file.    Social History   Social History     Social History     Marital status: N/A     Spouse name: N/A     Number of children: N/A     Years of education: N/A     Occupational History     Not on file.     Social History Main Topics     Smoking status: Not on file     Smokeless tobacco: Not on file     Alcohol use Not on file     Drug use: Not on file     Sexual activity: Not on file     Other Topics Concern     Not on file     Social History Narrative       Allergies   No Known Allergies       Brief summary of hospital stay to date:      27 year old with long history of polysubstance abuse, presented with status epilepticus and was transferred from [facility] on 12/16 for continuous EEG monitoring. unknown history, was on methamphetamine \"binge\" earlier this year and moved in with her father over the last few months. Dad describes rapidly progressive cognitive deficits over the last 3 months. She has been having history of staring spells and has had 5 generalized convulsive seizures on 12/16 while on the way from home to [facility]. Notably the patient was given atracurium by EMS during her 3rd seizure and received propofol only after her 5 th seizure ( she arrived at [facility] ER by that time).     Present Medications    pantoprazole (PROTONIX) IV PEDS/NICU  40 mg Intravenous Daily     levETIRAcetam  1,500 mg Intravenous Q12H     propofol         heparin  5,000 Units Subcutaneous Q12H     senna-docusate  1 tablet Oral or Feeding Tube At Bedtime     propofol     "       NaCl 75 mL/hr at 17     propofol (DIPRIVAN) infusion 65 mcg/kg/min (17 0505)     fentaNYL 25 mcg/hr (17 0500)       EXAMINATION    Vital Signs  /59  Temp 98.9  F (37.2  C) (Axillary)  Resp 16  Wt 63.5 kg (139 lb 15.9 oz)  SpO2 96%    Tmax: Temp (24hrs), Av.7  F (37.1  C), Min:98.6  F (37  C), Max:98.9  F (37.2  C)      Intake/Output:   Intake/Output Summary (Last 24 hours) at 17 0509  Last data filed at 17 0500   Gross per 24 hour   Intake          1366.98 ml   Output             1375 ml   Net            -8.02 ml         Nutrition   Active Diet Order      NPO for Medical/Clinical Reasons Except for: No Exceptions    NeuroCritical Neurologic Examination  Cranial nerve examination:   Pupils constricted. Cough present. VOR present. Face symmetric.  Motor: Moves all extremities equally  Reflexes: equal all over.    Cardiovascular:  RRR  Pulmonary:  no respiratory distress  Abdominal:  soft, non-tender  Extremities:  no edema, peripheral pulses palpable    Laboratory Findings    Data     CMP   Recent Labs  Lab 17      POTASSIUM 3.5   CHLORIDE 109   CO2 21   ANIONGAP 10   GLC 98   BUN 9   CR 0.61   GFRESTIMATED >90   GFRESTBLACK >90   TALA 7.7*        CBC   Recent Labs  Lab 17   WBC 16.8*   RBC 3.96   HGB 12.6   HCT 37.0   MCV 93   MCH 31.8   MCHC 34.1   RDW 12.7          INR, PTT   Recent Labs  Lab 17   INR 1.04   PTT 25        Arterial Blood Gas No lab results found in last 7 days.    UA  No lab results found in last 7 days.     UDS  at [facility] negative.    Micro   Recent Labs  Lab 17   CLAUDIA Flores          Radiological Data  Data   Recent Results (from the past 48 hour(s))   XR Abdomen Port 1 View    Narrative    XR ABDOMEN PORT F1 VW 2017 12:37 AM    History: OG placement;     Comparison: None.    Findings: Endotracheal tube in the mid intrathoracic trachea. Gastric  tube with tip and sidehole  projecting over the stomach. Nonobstructive  bowel gas pattern. No pneumatosis or portal venous gas. Streaky  opacities in the lung bases.      Impression    Impression: Gastric tube with tip and sidehole projecting over the  stomach.        Ventilation Mode: CMV/AC  FiO2 (%): 40 %  Rate Set (breaths/minute): 16 breaths/min  Tidal Volume Set (mL): 450 mL  PEEP (cm H2O): 5 cmH2O  Oxygen Concentration (%): 40 %  Resp: 16      ASSESSMENT AND PLAN      # Neuro  Status epilepticus- unknown etiology.  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  MRI and LP pending.  Continuous EEG.  Keppra 1500 mg bid.  Continue propofol for today. Currently on 75 mcg/kg/min. Will start predecex later on this evening and wean off fentanyl and propofol.    # CVS:  Stable, SBP < 140    # Resp:  Acute hypoxic respiratory failure 2/2 status  Intubated.    # GI:   NG tube- start tube feeds tonight.  Stable, bowel regimen    # Endocrine: Stable, no issues.    # Heme/ID: Leukocytosis- monitor.  pan culture if fever.    # Renal:   Strict I/O.  Electrolyte replacement protocol, stable.    Diet: NPO for now.  DVT ppx: Heparin  IVF: NS at 75 cc/hr    Full Code       Case discussed with attending: Dr. Taina GARCIA  Vascular Neurology fellow  5:10 AM December 17, 2017

## 2017-12-17 NOTE — PLAN OF CARE
Problem: Restraint for Non-Violent/Non-Self-Destructive Behavior  Goal: Prevent/Manage Potential Problems  Maintain safety of patient and others during period of restraint.  Promote psychological and physical wellbeing.  Prevent injury to skin and involved body parts.  Promote nutrition, hydration, and elimination.   Outcome: No Change  Patient has bilateral soft wrist restraints to protect airway and prevent harm to self. MD order to use for one day and to reassess. Skin assessed every 2 hours, frausto catheter emptied every 2 hours, and patient kept NPO. Will continue restraints due to patient being intermittently restless and labile. Will continue to monitor and assess need for restraints.

## 2017-12-17 NOTE — PROGRESS NOTES
EEG CLINICAL NEUROPHYSIOLOGY PRELIMINARY REPORT    Video-EEG through 0600 today reviewed. Continues on propofol 75 ug/kg-min throughout. EEG with low amplitude irregular delta with superimposed continuous 10-15 Hz activity. Poorly reactive. No periodic discharges or seizures.    EEG recording continues consistent with anesthetic doses employed. Cerebral dysfunction separate from anesthetic effects cannot be excluded but malignant patterns not seen. No ongoing seizures. Full report to follow.    Kulwinder Ambrose MD  Pager [phone]

## 2017-12-17 NOTE — H&P
"[facility]  Neuro Critical Care Service  Neuro-ICU H+P     Admission Date: 12/16/2017  Date of Service:12/16/2017  Hospital Day: 1  History of Present Illness   -Patient is a 27-year-old female with history of polysubstance abuse, markedly opioids and methamphetamine who presents to hospital status epilepticus. History is primarily obtained through chart review and outside records is known is available currently taking interview and the patient is intubated and sedated. Patient apparently has a long significant history of substance abuse and was on a methamphetamine \"binge\" earlier this year and recently moved in with her father over the last few months. The father believes that the patient has been sober, however he notes that her intelligence has dramatically decreased over the last 3 months. It is unclear at this point whether this is progressive. Over the last several weeks she has had episodes of staring spells but denies being overtly sick. Father notes that she has been severely depressed over the last month. Today, she did have a generalized tonic-clonic seizure. The length of the time is unknown but she did have 3 more spells and was transferred to an outside hospital. At the outside hospital she was loaded with Keppra and Versed and intubation was attempted, but failed. She did have a fourth seizure at the outside hospital and was transferred to [facility] for further care. While en route to [facility], she had a fifth seizure which prompted paralyzation and subsequent intubation. Per chart review it appears she was given boluses of propofol at [facility] and transferred here for continuous video EEG monitoring.    Assessment and Plan   Neurological:  # Status epilepticus  Unclear etiology at this point, CT head done at the outside hospital appears to have old chronic infarctions and atrophy. This is possibly due to her long-standing history of drug use. Will have further workup likely including " MRI and possible lumbar puncture.   -Continue propofol   -Increase Keppra to 1500 mg b.i.d.   -Will consider loading with fosphenytoin if needed   -Consider MRI   -Consider LP    Respiratory:  #Acute hypoxic Respiratory failure   - Patient is not ready for weaning from neurological standpoint.    Cardiovascular:  #Stable   - SBP goal <140    Endocrine:  # Stable   - SSI, goal blood sugar 100-150    Heme/ID:  # Leukocytosis, likely reactive  Continue to monitor     GI:  # Stable   - bowel regimen     Renal:  # Stable   - sodium goal normonatremia   - strict I&Os   - electrolyte replacement protocols    Skin/Musculoskeletal:  # Stable    Diet: Healthy diet and exercise reviewed.    IVF: NS at 75cc/hr  Bowel ppx: Senna  DVT ppx: Heparin    CODE STATUS: Full Code  ================================================================  Physical Examination:   Vitals: SpO2 98 %.  Tmax: No data recorded.    Vital Signs with Ranges  FiO2 (%):  [40 %] 40 %  SpO2:  [98 %] 98 %     HEENT: moist membranes, no oral lesions  Cardiovascular: RRR, S1/S2, no murmur/rub/gallop  Lungs: both hemithoraces are symmetrical, CTAB, no rales or rhonci  Abdomen: soft, not tender, not distended  Extremities: no clubbing, no cyanosis,  Neuro:  Mental status Intubated and sedated on propofol, does not follow commands.  CN: Oculocephalic intact, no blink to threat, cough intact  Extremities: withdraws to noxious stimuli in all 4 extremities  Reflexes: brisk and symmetric       Medications:   Scheduled Meds:    pantoprazole (PROTONIX) IV PEDS/NICU  40 mg Intravenous Daily     Infusions:    NaCl       PRN Meds:naloxone, labetalol, potassium chloride, potassium chloride, potassium chloride, potassium chloride with lidocaine, potassium chloride, magnesium sulfate, magnesium sulfate, potassium phosphate (KPHOS) in D5W IV, potassium phosphate (KPHOS) in D5W IV, potassium phosphate (KPHOS) in D5W IV, potassium phosphate (KPHOS) in D5W IV, potassium phosphate  ([facility]) in D5W IV       Data:   ROUTINE IP LABS  CBC RESULTS:  Recent Labs   Lab Test  12/16/17 1929   WBC  16.8*   RBC  3.96   HGB  12.6   HCT  37.0   PLT  226     Basic Metabolic Panel:  Recent Labs   Lab Test  12/16/17 1929   NA  140   POTASSIUM  3.5   CHLORIDE  109   CO2  21   BUN  9   CR  0.61   GLC  98   TALA  7.7*     Liver panel:  No lab results found.  INR:  Recent Labs   Lab Test  12/16/17 1929   INR  1.04      Lipid Profile:  No lab results found.  Thyroid Panel:  No lab results found.  A1C:  Recent Labs   Lab Test  12/16/17 1929   A1C  5.1     Troponin I:  Recent Labs   Lab Test  12/16/17 1929   TROPI  <0.015     CPK:  No lab results found.  Ammonia:  No lab results found.  Vitamin B12:  No lab results found.   Vitamin D:  No lab results found.  Most Recent 6 Bacteria Isolates From Any Culture:  No lab results found.     IMAGING:   All imaging studies were reviewed personally.  No results found for this or any previous visit (from the past 24 hour(s)).     Hospital Course: procedure, complication, diagnosis, treatment:   Admission Date: 12/16/2017  Intubation Date: 12/16/2017  Surgeries / Procedures:    Ruel Geiger

## 2017-12-18 ENCOUNTER — APPOINTMENT (OUTPATIENT)
Dept: GENERAL RADIOLOGY | Facility: CLINIC | Age: 27
End: 2017-12-18
Attending: NURSE PRACTITIONER
Payer: MEDICAID

## 2017-12-18 ENCOUNTER — ALLIED HEALTH/NURSE VISIT (OUTPATIENT)
Dept: NEUROLOGY | Facility: CLINIC | Age: 27
End: 2017-12-18
Attending: PSYCHIATRY & NEUROLOGY
Payer: MEDICAID

## 2017-12-18 DIAGNOSIS — R56.9 SEIZURES (H): Primary | ICD-10-CM

## 2017-12-18 LAB
ALBUMIN UR-MCNC: NEGATIVE MG/DL
ANION GAP SERPL CALCULATED.3IONS-SCNC: 12 MMOL/L (ref 3–14)
APPEARANCE UR: CLEAR
BILIRUB UR QL STRIP: NEGATIVE
BUN SERPL-MCNC: 4 MG/DL (ref 7–30)
CALCIUM SERPL-MCNC: 8.4 MG/DL (ref 8.5–10.1)
CHLORIDE SERPL-SCNC: 105 MMOL/L (ref 94–109)
CK SERPL-CCNC: 173 U/L (ref 30–225)
CO2 SERPL-SCNC: 20 MMOL/L (ref 20–32)
COLOR UR AUTO: YELLOW
CREAT SERPL-MCNC: 0.55 MG/DL (ref 0.52–1.04)
ERYTHROCYTE [DISTWIDTH] IN BLOOD BY AUTOMATED COUNT: 12.5 % (ref 10–15)
GFR SERPL CREATININE-BSD FRML MDRD: >90 ML/MIN/1.7M2
GLUCOSE BLDC GLUCOMTR-MCNC: 79 MG/DL (ref 70–99)
GLUCOSE SERPL-MCNC: 106 MG/DL (ref 70–99)
GLUCOSE UR STRIP-MCNC: NEGATIVE MG/DL
GRAM STN SPEC: NORMAL
GRAM STN SPEC: NORMAL
HCT VFR BLD AUTO: 32.8 % (ref 35–47)
HGB BLD-MCNC: 11 G/DL (ref 11.7–15.7)
HGB UR QL STRIP: ABNORMAL
HIV 1+2 AB+HIV1 P24 AG SERPL QL IA: NONREACTIVE
HSV1 DNA CSF QL NAA+PROBE: NOT DETECTED
HSV2 DNA CSF QL NAA+PROBE: NOT DETECTED
INTERPRETATION ECG - MUSE: NORMAL
KETONES UR STRIP-MCNC: >150 MG/DL
LEUKOCYTE ESTERASE UR QL STRIP: ABNORMAL
MAGNESIUM SERPL-MCNC: 1.7 MG/DL (ref 1.6–2.3)
MCH RBC QN AUTO: 31.9 PG (ref 26.5–33)
MCHC RBC AUTO-ENTMCNC: 33.5 G/DL (ref 31.5–36.5)
MCV RBC AUTO: 95 FL (ref 78–100)
MICROBIOLOGIST REVIEW: NORMAL
MUCOUS THREADS #/AREA URNS LPF: PRESENT /LPF
NITRATE UR QL: POSITIVE
PH UR STRIP: 5 PH (ref 5–7)
PHOSPHATE SERPL-MCNC: 2.6 MG/DL (ref 2.5–4.5)
PLATELET # BLD AUTO: 176 10E9/L (ref 150–450)
POTASSIUM SERPL-SCNC: 3.2 MMOL/L (ref 3.4–5.3)
PROCALCITONIN SERPL-MCNC: 0.05 NG/ML
RBC # BLD AUTO: 3.45 10E12/L (ref 3.8–5.2)
RBC #/AREA URNS AUTO: 13 /HPF (ref 0–2)
S PNEUM AG SPEC QL: NORMAL
SODIUM SERPL-SCNC: 138 MMOL/L (ref 133–144)
SOURCE: ABNORMAL
SP GR UR STRIP: 1.01 (ref 1–1.03)
SPECIMEN SOURCE: NORMAL
SPECIMEN SOURCE: NORMAL
SPECIMEN TYPE: NORMAL
TRIGL SERPL-MCNC: 290 MG/DL
UROBILINOGEN UR STRIP-MCNC: NORMAL MG/DL (ref 0–2)
VARICELLA ZOSTER DNA PCR COMMENT: NORMAL
VZV DNA SPEC QL NAA+PROBE: NORMAL
WBC # BLD AUTO: 11.7 10E9/L (ref 4–11)
WBC #/AREA URNS AUTO: 7 /HPF (ref 0–2)

## 2017-12-18 PROCEDURE — 00000146 ZZHCL STATISTIC GLUCOSE BY METER IP

## 2017-12-18 PROCEDURE — 82550 ASSAY OF CK (CPK): CPT | Performed by: PSYCHIATRY & NEUROLOGY

## 2017-12-18 PROCEDURE — 84145 PROCALCITONIN (PCT): CPT | Performed by: NURSE PRACTITIONER

## 2017-12-18 PROCEDURE — 80048 BASIC METABOLIC PNL TOTAL CA: CPT | Performed by: NURSE PRACTITIONER

## 2017-12-18 PROCEDURE — 25000128 H RX IP 250 OP 636

## 2017-12-18 PROCEDURE — 25000132 ZZH RX MED GY IP 250 OP 250 PS 637: Performed by: STUDENT IN AN ORGANIZED HEALTH CARE EDUCATION/TRAINING PROGRAM

## 2017-12-18 PROCEDURE — 40000275 ZZH STATISTIC RCP TIME EA 10 MIN

## 2017-12-18 PROCEDURE — 87088 URINE BACTERIA CULTURE: CPT | Performed by: PSYCHIATRY & NEUROLOGY

## 2017-12-18 PROCEDURE — 84100 ASSAY OF PHOSPHORUS: CPT | Performed by: NURSE PRACTITIONER

## 2017-12-18 PROCEDURE — 87070 CULTURE OTHR SPECIMN AEROBIC: CPT | Performed by: NURSE PRACTITIONER

## 2017-12-18 PROCEDURE — 87040 BLOOD CULTURE FOR BACTERIA: CPT | Performed by: NURSE PRACTITIONER

## 2017-12-18 PROCEDURE — 25000128 H RX IP 250 OP 636: Performed by: STUDENT IN AN ORGANIZED HEALTH CARE EDUCATION/TRAINING PROGRAM

## 2017-12-18 PROCEDURE — 36415 COLL VENOUS BLD VENIPUNCTURE: CPT | Performed by: NURSE PRACTITIONER

## 2017-12-18 PROCEDURE — 27210429 ZZH NUTRITION PRODUCT INTERMEDIATE LITER

## 2017-12-18 PROCEDURE — 94003 VENT MGMT INPAT SUBQ DAY: CPT

## 2017-12-18 PROCEDURE — 25000132 ZZH RX MED GY IP 250 OP 250 PS 637: Performed by: NURSE PRACTITIONER

## 2017-12-18 PROCEDURE — 87077 CULTURE AEROBIC IDENTIFY: CPT | Performed by: NURSE PRACTITIONER

## 2017-12-18 PROCEDURE — 87535 HIV-1 PROBE&REVERSE TRNSCRPJ: CPT | Performed by: INTERNAL MEDICINE

## 2017-12-18 PROCEDURE — 87516 HEPATITIS B DNA AMP PROBE: CPT | Performed by: INTERNAL MEDICINE

## 2017-12-18 PROCEDURE — 40000141 ZZH STATISTIC PERIPHERAL IV START W/O US GUIDANCE

## 2017-12-18 PROCEDURE — 95951 ZZHC EEG VIDEO EACH 24 HR: CPT | Mod: ZF

## 2017-12-18 PROCEDURE — 87205 SMEAR GRAM STAIN: CPT | Performed by: NURSE PRACTITIONER

## 2017-12-18 PROCEDURE — 25000128 H RX IP 250 OP 636: Performed by: PSYCHIATRY & NEUROLOGY

## 2017-12-18 PROCEDURE — 36415 COLL VENOUS BLD VENIPUNCTURE: CPT | Performed by: INTERNAL MEDICINE

## 2017-12-18 PROCEDURE — 36415 COLL VENOUS BLD VENIPUNCTURE: CPT | Performed by: PSYCHIATRY & NEUROLOGY

## 2017-12-18 PROCEDURE — 81001 URINALYSIS AUTO W/SCOPE: CPT | Performed by: NURSE PRACTITIONER

## 2017-12-18 PROCEDURE — 87798 DETECT AGENT NOS DNA AMP: CPT | Performed by: INTERNAL MEDICINE

## 2017-12-18 PROCEDURE — 25000125 ZZHC RX 250: Performed by: STUDENT IN AN ORGANIZED HEALTH CARE EDUCATION/TRAINING PROGRAM

## 2017-12-18 PROCEDURE — 87186 SC STD MICRODIL/AGAR DIL: CPT | Performed by: NURSE PRACTITIONER

## 2017-12-18 PROCEDURE — 87086 URINE CULTURE/COLONY COUNT: CPT | Performed by: PSYCHIATRY & NEUROLOGY

## 2017-12-18 PROCEDURE — 84478 ASSAY OF TRIGLYCERIDES: CPT | Performed by: PSYCHIATRY & NEUROLOGY

## 2017-12-18 PROCEDURE — 83735 ASSAY OF MAGNESIUM: CPT | Performed by: NURSE PRACTITIONER

## 2017-12-18 PROCEDURE — 87521 HEPATITIS C PROBE&RVRS TRNSC: CPT | Performed by: INTERNAL MEDICINE

## 2017-12-18 PROCEDURE — 87389 HIV-1 AG W/HIV-1&-2 AB AG IA: CPT | Performed by: STUDENT IN AN ORGANIZED HEALTH CARE EDUCATION/TRAINING PROGRAM

## 2017-12-18 PROCEDURE — 20000004 ZZH R&B ICU UMMC

## 2017-12-18 PROCEDURE — 71010 XR CHEST PORT 1 VW: CPT

## 2017-12-18 PROCEDURE — 85027 COMPLETE CBC AUTOMATED: CPT | Performed by: PSYCHIATRY & NEUROLOGY

## 2017-12-18 RX ORDER — DEXMEDETOMIDINE HYDROCHLORIDE 4 UG/ML
0.2-0.7 INJECTION, SOLUTION INTRAVENOUS CONTINUOUS
Status: DISCONTINUED | OUTPATIENT
Start: 2017-12-18 | End: 2017-12-20

## 2017-12-18 RX ORDER — ACETAMINOPHEN 325 MG/1
650 TABLET ORAL EVERY 4 HOURS PRN
Status: DISCONTINUED | OUTPATIENT
Start: 2017-12-18 | End: 2017-12-22 | Stop reason: HOSPADM

## 2017-12-18 RX ADMIN — ACETAMINOPHEN 650 MG: 325 TABLET, FILM COATED ORAL at 20:26

## 2017-12-18 RX ADMIN — FENTANYL CITRATE 50 MCG/HR: 50 INJECTION, SOLUTION INTRAMUSCULAR; INTRAVENOUS at 14:10

## 2017-12-18 RX ADMIN — PIPERACILLIN SODIUM AND TAZOBACTAM SODIUM 4.5 G: 36; 4.5 INJECTION, POWDER, LYOPHILIZED, FOR SOLUTION INTRAVENOUS at 20:26

## 2017-12-18 RX ADMIN — HEPARIN SODIUM 5000 UNITS: 5000 INJECTION, SOLUTION INTRAVENOUS; SUBCUTANEOUS at 08:19

## 2017-12-18 RX ADMIN — DEXMEDETOMIDINE HYDROCHLORIDE 0.2 MCG/KG/HR: 4 INJECTION, SOLUTION INTRAVENOUS at 16:09

## 2017-12-18 RX ADMIN — VANCOMYCIN HYDROCHLORIDE 1500 MG: 10 INJECTION, POWDER, LYOPHILIZED, FOR SOLUTION INTRAVENOUS at 14:11

## 2017-12-18 RX ADMIN — SODIUM CHLORIDE: 9 INJECTION, SOLUTION INTRAVENOUS at 14:15

## 2017-12-18 RX ADMIN — SODIUM CHLORIDE: 9 INJECTION, SOLUTION INTRAVENOUS at 04:11

## 2017-12-18 RX ADMIN — PIPERACILLIN SODIUM AND TAZOBACTAM SODIUM 4.5 G: 36; 4.5 INJECTION, POWDER, LYOPHILIZED, FOR SOLUTION INTRAVENOUS at 14:10

## 2017-12-18 RX ADMIN — POTASSIUM CHLORIDE 20 MEQ: 1.5 POWDER, FOR SOLUTION ORAL at 14:10

## 2017-12-18 RX ADMIN — MULTIVITAMIN 15 ML: LIQUID ORAL at 16:09

## 2017-12-18 RX ADMIN — LEVETIRACETAM 1500 MG: 1500 INJECTION, SOLUTION INTRAVENOUS at 20:25

## 2017-12-18 RX ADMIN — PROPOFOL 50 MCG/KG/MIN: 10 INJECTION, EMULSION INTRAVENOUS at 04:11

## 2017-12-18 RX ADMIN — LEVETIRACETAM 1500 MG: 1500 INJECTION, SOLUTION INTRAVENOUS at 08:19

## 2017-12-18 RX ADMIN — SENNOSIDES AND DOCUSATE SODIUM 1 TABLET: 8.6; 5 TABLET ORAL at 21:55

## 2017-12-18 RX ADMIN — Medication 2 G: at 16:12

## 2017-12-18 RX ADMIN — PANTOPRAZOLE SODIUM 40 MG: 40 INJECTION, POWDER, FOR SOLUTION INTRAVENOUS at 08:19

## 2017-12-18 RX ADMIN — ACETAMINOPHEN 650 MG: 325 TABLET, FILM COATED ORAL at 13:05

## 2017-12-18 RX ADMIN — PROPOFOL 30 MCG/KG/MIN: 10 INJECTION, EMULSION INTRAVENOUS at 13:05

## 2017-12-18 RX ADMIN — HEPARIN SODIUM 5000 UNITS: 5000 INJECTION, SOLUTION INTRAVENOUS; SUBCUTANEOUS at 20:26

## 2017-12-18 ASSESSMENT — VISUAL ACUITY
OU: OTHER (SEE COMMENT)

## 2017-12-18 NOTE — CONSULTS
"Social Work: Assessment with Discharge Plan    Patient Name:  Bobby Howard  :  1990  Age:  27 year old  MRN:  [id number]  Risk/Complexity Score:     Completed assessment with:  Patient's family, chart review, attended rounds     Presenting Information   Reason for Referral:  Stroke consult  Date of Intake:  2017  Referral Source:  Consult  Decision Maker:  Patient at baseline.   Alternate Decision Maker:  Per [facility] Next of Kin policy and chart review, pt does not have adult children or a spouse, so BOTH parents would be alternate decision makers.   Health Care Directive:  Not on file. Pt not appropriate to complete at this time.   Living Situation:  House with pt's father. Per father, pt moved from [place] around [facility] to live with him in [place].  Previous Functional Status:  Independent  Patient and family understanding of hospitalization:  Restorative   Cultural/Language/Spiritual Considerations:  Per chart review, pt is Judaism and English-speaking.   Adjustment to Illness:  Pt unable to participate in discussion at this time.    Physical Health  Reason for Admission:  No diagnosis found.  Services Needed/Recommended:  TBD, pending hospital course.     Mental Health/Chemical Dependency  Diagnosis:  No diagnoses listed in chart, but H&P references dad's report that pt \"has been severely depressed over the last month.\" H&P also notes that pt has a \"history of polysubstance abuse, markedly opioids and methamphetamine.\"   Support/Services in Place:  None identified.   Services Needed/Recommended:  TBD.     Support System  Significant relationship at present time:  Unclear, but pt's dad (and his girlfriend), sister and aunt were present during SW visit.   Family of origin is available for support:  Yes   Other support available:  Unknown   Gaps in support system:  Unknown  Patient is caregiver to:  None. Per chart review, pt has a 9, 5 and 3 year old that have been removed from " her custody. Did not discuss this with pt/family.     Provider Information   Primary Care Physician:  No primary care provider on file.   None   Clinic:  No primary physician on file.      :  None identified.     Financial   Income Source:  Pt's father reported that pt is not currently working.   Financial Concerns:  None identified.   Insurance:    Payor/Plan Subscriber Name Rel Member # Group #   [place] NAEEM OLIVARES  [id number]       [address] [address]       Discharge Plan   Patient and family discharge goal:  Did not discuss specifics, but pt's dad thought pt may need some sort of services at d/c and was open to SW involvement with this process, as needed.   Barriers to discharge:  Medical stability     Discharge Recommendations   Anticipated Disposition:  TBD, pending hospital course.  Transportation Needs:  TBD   Name of Transportation Company and Phone:  TBD     Additional comments   Met with pt's dad, sister, aunt and dad's girlfriend at the bedside. Introduced self and role of SW. Obtained much of the above information from chart review, but confirmed a few details with family. Family was pleasant to SW, though did not seem open to engaging much with SW at this time. Allowed them to spend time as a family with pt and provided SW contact info should needs arise. They thanked SW for stopping by.     Kristin Bolaños, DONA, [facility]  ICU Float   Pager: [phone]  [email or web address]     NO LETTER

## 2017-12-18 NOTE — PLAN OF CARE
Problem: Restraint for Non-Violent/Non-Self-Destructive Behavior  Goal: Prevent/Manage Potential Problems  Maintain safety of patient and others during period of restraint.  Promote psychological and physical wellbeing.  Prevent injury to skin and involved body parts.  Promote nutrition, hydration, and elimination.   Outcome: No Change  Wrist restraints still in place dt pulling at ET tube. Will continue to monitor and reassess restraint need PRN.

## 2017-12-18 NOTE — PLAN OF CARE
Problem: Patient Care Overview  Goal: Plan of Care/Patient Progress Review  Outcome: No Change  D: patient admitted for video EEG monitoring after having 3-5 seizures at home and OSH. No evidence of continued seizure activity, keppra continues. Remains intubated. MRI and lumbar puncture done today, awaiting results.     I/A:     Neuro: Opens eyes spontaneously or to light touch. Moving all extremities, restless with lightened sedation or with repositioning. Nodding to family, grimacing and lifting head off bed, sitting forward. Calms with medications boluses. Continues on propofol and fentanyl. Pupils equal and briskly reactive.   Cardiac: SR to sinus tachycardia in 90-100s. No ectopy. BPs stable 100-110s/70s.   Respiratory: Vented, CMV, no PS today, Lungs coarse throughout, moderate amounts of thick tan secretions.   GI: TF ordered for overnight. OG currently to LIS.   : Prasad in place, good UO  ID: Afebrile       P: Continue to monitor, may extubate tomorrow if no seizures with decreased propofol. Notify MD of any acute events. Family at bedside

## 2017-12-18 NOTE — PHARMACY-VANCOMYCIN DOSING SERVICE
Pharmacy Vancomycin Initial Note  Date of Service 2017  Patient's  1990  27 year old, female    Indication: Aspiration Pneumonia    Current estimated CrCl = Estimated Creatinine Clearance: 155 mL/min (based on Cr of 0.55).    Creatinine for last 3 days  2017:  7:29 PM Creatinine 0.61 mg/dL  2017: 12:08 PM Creatinine 0.55 mg/dL    Recent Vancomycin Level(s) for last 3 days  No results found for requested labs within last 72 hours.      Vancomycin IV Administrations (past 72 hours)      No vancomycin orders with administrations in past 72 hours.                Nephrotoxins and other renal medications (Future)    Start     Dose/Rate Route Frequency Ordered Stop    17 0300  vancomycin (VANCOCIN) 1,250 mg in NaCl 0.9 % 250 mL intermittent infusion      1,250 mg  over 90 Minutes Intravenous EVERY 12 HOURS 17 1320      17 1400  piperacillin-tazobactam (ZOSYN) 3.375 in 15 mL NS Premix Syringe      3.375 g  over 3 Minutes Intravenous EVERY 8 HOURS SCHEDULED 17 1304      17 1330  vancomycin (VANCOCIN) 1,500 mg in NaCl 0.9 % 250 mL intermittent infusion      1,500 mg  over 90 Minutes Intravenous ONCE 17 1319            Contrast Orders - past 72 hours (72h ago through future)    Start     Dose/Rate Route Frequency Ordered Stop    17 1500  gadobutrol (GADAVIST) injection 6.53 mL      0.1 mL/kg × 65.3 kg (Dosing Weight) Intravenous ONCE 17 1447 17 1516                Plan:  1.  Start vancomycin  1500 mg iv x 1 then 1250 mg iv q12h   2.  Goal Trough Level: 15-20 mg/L   3.  Pharmacy will check trough levels as appropriate in 1-3 Days.    4. Serum creatinine levels will be ordered daily for the first week of therapy and at least twice weekly for subsequent weeks.    5. Brutus method utilized to dose vancomycin therapy: Method 2    Maine Ann, BeatrisD
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
## 2017-12-18 NOTE — PLAN OF CARE
Problem: Patient Care Overview  Goal: Plan of Care/Patient Progress Review  PT 4A: CANCEL; per RN, pt not extubating this date and remains sedated unless for neuro checks. Will reschedule.

## 2017-12-18 NOTE — PHARMACY
Pharmacy Tube Feeding Consult    Medication reviewed for administration by feeding tube and for potential food/drug interactions.    Recommendation: No changes are needed at this time.     Pharmacy will continue to follow as new medications are ordered.    Maine Ann, BeatrisD

## 2017-12-18 NOTE — PROCEDURES
VIDEO EEG DAY 2      EEG #:  -2      DATE OF RECORDIN2017.      SOURCE FILE DURATION:  23 hours 55 minutes.      PATIENT INFORMATION:  Bobby Howard is a 27-year-old female with a history of polysubstance abuse, presented to the hospital with status epilepticus.  EEG is being done to evaluate for seizures.      MEDICATIONS:  Levetiracetam 1500 mg twice a day and Fentanyl 25-50 mcg per hour.  Propofol 50-75 mcg per kilogram per minute.     TECHNICAL SUMMARY: This video EEG monitoring procedure was performed with 23 scalp electrodes in 10-20 system placements, and additional scalp, precordial and other surface electrodes used for electrical referencing and artifact detection. No electroclinical seizures or any electrographic seizures were seen. Video was reviewed intermittently by EEG technologist and physician for clinical seizures.      EEG FINDING:  The patient has diffuse beta activity up to 20 Hz in frequency.  This is maximally seen in the parasagittal and midline electrodes.  The temporal chain electrodes have a lower amplitude beta activity.  There is underlying polymorphic delta slowing throughout the entire record,  the patient does not have a well-formed parietooccipital, there is no sleep-wake distinction, and there is no sleep architecture that is appreciated.  When patient is stimulated with auditory or painful stimuli, the patient is unresponsive and during this time, EEG does have more mixed frequencies and an increase in amplitude.  I am suggesting the EEG is reactive.      EPILEPTIFORM DISCHARGES:  None.      ICTAL:  None.      IMPRESSION:  Video EEG day #2 is abnormal due to the presence of diffuse generalized delta slowing with superimposed beta activity, consistent with a severe encephalopathy.  The patient is currently on propofol and midazolam which would account for the beta activity.  No electrographic seizures, no epileptiform discharges are seen.  Specifically, the patient  is not in status epilepticus at this time.  Clinical correlation is advised.         KIM GOODRICH MD             D: 2017 12:28   T: 2017 13:12   MT: JATIN      Name:     NAEEM OLIVARES   MRN:      [id number]        Account:        [id number]   :      1990           Procedure Date: 2017      Document: [id number]

## 2017-12-18 NOTE — PROGRESS NOTES
Preliminary Video EEG impression on December 17-18, 2017  Until 6am   Full report to follow.    This video EEG is abnormal due to the presences of continuous generalized polymorphic delta with superimposed beta activity. There is no clear parieto-occipital rhythm or well formed sleep architecture. EEG is  reactive with stimulation.   This EEG is consistent with a severe diffuse encephalopathy. Early this morning patient is more awake and had spontaneous eye blinking and more mixed frequencies. No epileptiform discharges or electrographic seizures were seen in this record. If events of interest are noted, please press the event button. Clinical correlation is advised.     Dolly Lloyd MD  Staff Epilepsy Neurologist   [phone]

## 2017-12-18 NOTE — PLAN OF CARE
Problem: Patient Care Overview  Goal: Plan of Care/Patient Progress Review  Status: patient intermittently restless and sedated overnight.   D/I: Patient on unit 4A Surgical/Neuro ICU s/p admit from [facility] [place] [facility] for continuous EEG on 12/16/17 due to multiple seizures on 12/16/17.    Neuro- intermittently sedated and lethargic. Patient is on Propofol @50 and Fentanyl @50. Patient is able to move all extremities spontaneously but not to command. Upper extremities restrained bilaterally to protect airway and prevent pulling at lines. Continuous EEG; no seizures noted per note. Neuro checks every 2 hours. MRI done 12/17/17; results MD went over with Dad, dad's fiancee, sister, and aunt 12/17/17 PM. Mom updated via phone but not MRI results.   CV- VSS, tachycardia 130s was the highest. Afebrile.   Pulm- lung sounds clear, on ventilator. CMV: fio2 40%, tidal volume 450, rate 14, and PEEP 5. Thick oral secretions.   GI/-Prasad catheter in place; outputting appropriately. NPO&OG to tube feeds @20, increase by 10cc every 8 hours; water flushes 30cc every 4 hours. No BM overnight.  Skin-intact; various tattoos and piercing sites. Lumbar puncture site covered (12/17/17).   Gtts- ns@75, propofol @50, fentanyl @50.   Pain- britney; CPOT; continuous fentanyl gtt.  Electrolytes- replaced per protocol.  See flow sheets for further interventions and assessments.  P: Continue to monitor pt closely, Notify MD of changes/concerns.

## 2017-12-18 NOTE — PROGRESS NOTES
"CLINICAL NUTRITION SERVICES - ASSESSMENT NOTE     Nutrition Prescription    RECOMMENDATIONS FOR MDs/PROVIDERS TO ORDER:  Free water flush adjustment per MD discretion pending sodium and fluid status    Malnutrition Status:    Patient does not meet criteria necessary for diagnosing malnutrition    Recommendations already ordered by Registered Dietitian (RD):  -Reduce TF rate to the following to better meet pt's need while on the ventilator: Isosource 1.5 @ goal 55 ml/hr (1320 ml/day) to provide 1980 kcals (31 kcal/kg/day), 90 g PRO (1.4 g/kg/day), 1003 ml free H2O, 232 g CHO and 20 g Fiber daily.  -Order 15 mL liquid MVI for micronutrient needs  -30 mL water flush q4h for tube patency  -Assess gastric residuals and HOB >30 degrees while feeding gastrically    Future/Additional Recommendations:  -EN tolerance  -Once pt's extubated, follow for SLP evals and need to offer ONS/order kcal ct     REASON FOR ASSESSMENT  Bobby Howard is a/an 27 year old female assessed by the dietitian for Provider Order - Registered Dietitian to Assess and Order TF per Medical Nutrition Therapy Protocol    NUTRITION HISTORY  Per discussion with pt's family members, pt was eating well PTA without issue.    Pt currently intubated and sedated.    CURRENT NUTRITION ORDERS  Diet: NPO  Intake/Tolerance: Currently ordered by MDs on 12/17: Isosource 1.5 @ goal 60 ml/hr (1440 ml/day) to provide 2160 kcals (34 kcal/kg/day), 98 g PRO (1.6 g/kg/day), 1094 ml free H2O, 253 g CHO and 22 g Fiber daily; pt has received minimal since admission    LABS  12/18:  Triglycerides = 290 (H)    MEDICATIONS  Propofol    ANTHROPOMETRICS  Height: 5'4\"  Most Recent Weight: 62.8 kg (138 lb 7.2 oz)    IBW: 54.5 kg  BMI: 24; Normal BMI  Weight History:   Wt Readings from Last 10 Encounters:   12/17/17 62.8 kg (138 lb 7.2 oz)     Dosing Weight: 63 kg (actual based on lowest admit wt of 62.8 kg on 12/17, 115% of IBW 54.5 kg)    ASSESSED NUTRITION NEEDS  Estimated Energy " Needs: 5299-2930 kcals/day (25 - 30 kcals/kg)  Justification: Maintenance  Estimated Protein Needs: 76-95 grams protein/day (1.2 - 1.5 grams of pro/kg)  Justification: Increased needs  Estimated Fluid Needs: (1 mL/kcal)   Justification: Per provider pending fluid status    PHYSICAL FINDINGS  See malnutrition section below.  No abnormal nutrition-related physical findings observed.     MALNUTRITION  % Intake: No decreased intake noted  % Weight Loss: Unable to assess  Subcutaneous Fat Loss: None observed  Muscle Loss: None observed  Fluid Accumulation/Edema: None noted  Malnutrition Diagnosis: Patient does not meet two of the above criteria necessary for diagnosing malnutrition    NUTRITION DIAGNOSIS  Inadequate protein-energy intake related to intubation as evidenced by TF started late last evening, pt has received minimal nutrition since intubated 2 days ago      INTERVENTIONS  Implementation  Nutrition education for nutrition relationship to health/disease to pt's family members  Collaboration with other providers - Neuro ICU rounds  Enteral Nutrition - Modify rate  Feeding tube flush  Multivitamin/mineral supplement therapy     Goals  Total avg nutritional intake to meet a minimum of 25 kcal/kg and 1.2 g PRO/kg daily (per dosing wt 63 kg).     Monitoring/Evaluation  Progress toward goals will be monitored and evaluated per protocol.    Terri Stallings RDN, LD  Pgr: [phone]

## 2017-12-18 NOTE — PLAN OF CARE
Problem: Patient Care Overview  Goal: Plan of Care/Patient Progress Review  OT; 4AB: Plan to extubate pt, when PT rounded this AM, PT will initiate as appropriate this PM and OT will initiate 12/19/17 as appropriate.

## 2017-12-18 NOTE — PLAN OF CARE
Problem: Seizure Disorder/Epilepsy (Adult)  Goal: Signs and Symptoms of Listed Potential Problems Will be Absent, Minimized or Managed (Seizure Disorder/Epilepsy)  Signs and symptoms of listed potential problems will be absent, minimized or managed by discharge/transition of care (reference Seizure Disorder/Epilepsy (Adult) CPG).   Outcome: No Change  Pt continues to have neuro checks WNL. No noted seizure activity since 0700. Propofol drip titrated per neuro exam in anticipation of possible extubation. Pt still intubated at this time. Pt has mod amt of thick white secretions requiring inline suctioning. Had fever of 103.1 today. MD notified. Sputum culture sent. Urine culture sent. Blood cultures drawn. Chest xray taken. Pt continues to be tachycardic (120s-130s). Prasad cath in place with good UOP. Tube feeds held this afternoon in anticipation of possible extubation. Pt continues to be restless & attempting to pull out tubes, wrist restraints in place.    See flowsheets for full assessment. Will continue to monitor.

## 2017-12-18 NOTE — PROCEDURES
[facility] Procedure Note          Lumbar Puncture:      Time: 12:35 PM  Performed by: Iglesia Her  Authorized by: Iglesia Her    Indications: seizure    Consent given by: father who states understanding of the procedure being performed after discussing the risks, benefits and alternatives.Patient and Family    Prior to the start of the procedure and with procedural staff participation, I verbally confirmed the patient s identity using two indicators, relevant allergies, that the procedure was appropriate and matched the consent or emergent situation, and that the correct equipment/implants were available. Immediately prior to starting the procedure I conducted the Time Out with the procedural staff and re-confirmed the patient s name, procedure, and site/side. (The Joint Commission universal protocol was followed.) Yes    Under sterile conditions the patient was positioned L Lateral decubitus with knees drawn up. Betadine solution and sterile drapes were utilized.  Local anesthetic at the site: 2 ml of lidocaine 1% without epinephrine from the LP tray  A 22 G Pencil point spinal needle was inserted at the L 3-4 interspace.  Opening Pressure 29 cm H2O pressure.  A total of 28 mL of clear and colorless spinal fluid was obtained and sent to the laboratory.   After the needle was removed, a bandaid and pressure were applied and the patient was instructed to stay horizontal until the results were back.    Complications:  None    Patient tolerance: Patient tolerated the procedure well with no immediate complications.

## 2017-12-18 NOTE — PLAN OF CARE
Problem: Restraint for Non-Violent/Non-Self-Destructive Behavior  Goal: Prevent/Manage Potential Problems  Maintain safety of patient and others during period of restraint.  Promote psychological and physical wellbeing.  Prevent injury to skin and involved body parts.  Promote nutrition, hydration, and elimination.   Outcome: No Change  Patient intubated and sedated, reaching at lines/tubes when intermittently restless and agitated. Bilateral soft wrist restraints maintained for patient safety

## 2017-12-19 ENCOUNTER — APPOINTMENT (OUTPATIENT)
Dept: OCCUPATIONAL THERAPY | Facility: CLINIC | Age: 27
End: 2017-12-19
Attending: STUDENT IN AN ORGANIZED HEALTH CARE EDUCATION/TRAINING PROGRAM
Payer: MEDICAID

## 2017-12-19 ENCOUNTER — ALLIED HEALTH/NURSE VISIT (OUTPATIENT)
Dept: NEUROLOGY | Facility: CLINIC | Age: 27
End: 2017-12-19
Attending: PSYCHIATRY & NEUROLOGY
Payer: MEDICAID

## 2017-12-19 DIAGNOSIS — G40.901 STATUS EPILEPTICUS, GENERALIZED CONVULSIVE (H): Primary | ICD-10-CM

## 2017-12-19 LAB
ANION GAP SERPL CALCULATED.3IONS-SCNC: 10 MMOL/L (ref 3–14)
BUN SERPL-MCNC: 6 MG/DL (ref 7–30)
CALCIUM SERPL-MCNC: 7.8 MG/DL (ref 8.5–10.1)
CHLORIDE SERPL-SCNC: 109 MMOL/L (ref 94–109)
CO2 SERPL-SCNC: 22 MMOL/L (ref 20–32)
CREAT SERPL-MCNC: 0.53 MG/DL (ref 0.52–1.04)
ERYTHROCYTE [DISTWIDTH] IN BLOOD BY AUTOMATED COUNT: 12.5 % (ref 10–15)
GFR SERPL CREATININE-BSD FRML MDRD: >90 ML/MIN/1.7M2
GLUCOSE BLDC GLUCOMTR-MCNC: 127 MG/DL (ref 70–99)
GLUCOSE BLDC GLUCOMTR-MCNC: 133 MG/DL (ref 70–99)
GLUCOSE BLDC GLUCOMTR-MCNC: 180 MG/DL (ref 70–99)
GLUCOSE BLDC GLUCOMTR-MCNC: 199 MG/DL (ref 70–99)
GLUCOSE BLDC GLUCOMTR-MCNC: 211 MG/DL (ref 70–99)
GLUCOSE SERPL-MCNC: 126 MG/DL (ref 70–99)
HCT VFR BLD AUTO: 29.2 % (ref 35–47)
HGB BLD-MCNC: 9.7 G/DL (ref 11.7–15.7)
M PNEUMO DNA SPEC QL NAA+PROBE: NOT DETECTED
MAGNESIUM SERPL-MCNC: 2.1 MG/DL (ref 1.6–2.3)
MCH RBC QN AUTO: 31.4 PG (ref 26.5–33)
MCHC RBC AUTO-ENTMCNC: 33.2 G/DL (ref 31.5–36.5)
MCV RBC AUTO: 95 FL (ref 78–100)
PHOSPHATE SERPL-MCNC: 3.2 MG/DL (ref 2.5–4.5)
PLATELET # BLD AUTO: 153 10E9/L (ref 150–450)
POTASSIUM SERPL-SCNC: 3.4 MMOL/L (ref 3.4–5.3)
RBC # BLD AUTO: 3.09 10E12/L (ref 3.8–5.2)
SODIUM SERPL-SCNC: 141 MMOL/L (ref 133–144)
SPECIMEN SOURCE: NORMAL
WBC # BLD AUTO: 10.4 10E9/L (ref 4–11)
WNV IGG CSF-ACNC: 0.29 IV
WNV IGM CSF-ACNC: 0.01 IV

## 2017-12-19 PROCEDURE — 94003 VENT MGMT INPAT SUBQ DAY: CPT

## 2017-12-19 PROCEDURE — 40000133 ZZH STATISTIC OT WARD VISIT: Performed by: OCCUPATIONAL THERAPIST

## 2017-12-19 PROCEDURE — 85027 COMPLETE CBC AUTOMATED: CPT | Performed by: STUDENT IN AN ORGANIZED HEALTH CARE EDUCATION/TRAINING PROGRAM

## 2017-12-19 PROCEDURE — 25000128 H RX IP 250 OP 636

## 2017-12-19 PROCEDURE — 97535 SELF CARE MNGMENT TRAINING: CPT | Mod: GO | Performed by: OCCUPATIONAL THERAPIST

## 2017-12-19 PROCEDURE — 36415 COLL VENOUS BLD VENIPUNCTURE: CPT | Performed by: STUDENT IN AN ORGANIZED HEALTH CARE EDUCATION/TRAINING PROGRAM

## 2017-12-19 PROCEDURE — 20000004 ZZH R&B ICU UMMC

## 2017-12-19 PROCEDURE — 25000132 ZZH RX MED GY IP 250 OP 250 PS 637: Performed by: PSYCHIATRY & NEUROLOGY

## 2017-12-19 PROCEDURE — 95951 ZZHC EEG VIDEO EACH 24 HR: CPT | Mod: ZF

## 2017-12-19 PROCEDURE — 84100 ASSAY OF PHOSPHORUS: CPT | Performed by: STUDENT IN AN ORGANIZED HEALTH CARE EDUCATION/TRAINING PROGRAM

## 2017-12-19 PROCEDURE — 80048 BASIC METABOLIC PNL TOTAL CA: CPT | Performed by: STUDENT IN AN ORGANIZED HEALTH CARE EDUCATION/TRAINING PROGRAM

## 2017-12-19 PROCEDURE — 25000132 ZZH RX MED GY IP 250 OP 250 PS 637: Performed by: STUDENT IN AN ORGANIZED HEALTH CARE EDUCATION/TRAINING PROGRAM

## 2017-12-19 PROCEDURE — 25000131 ZZH RX MED GY IP 250 OP 636 PS 637: Performed by: STUDENT IN AN ORGANIZED HEALTH CARE EDUCATION/TRAINING PROGRAM

## 2017-12-19 PROCEDURE — 25000125 ZZHC RX 250: Performed by: STUDENT IN AN ORGANIZED HEALTH CARE EDUCATION/TRAINING PROGRAM

## 2017-12-19 PROCEDURE — 25000128 H RX IP 250 OP 636: Performed by: PSYCHIATRY & NEUROLOGY

## 2017-12-19 PROCEDURE — 25000132 ZZH RX MED GY IP 250 OP 250 PS 637: Performed by: NURSE PRACTITIONER

## 2017-12-19 PROCEDURE — 25000128 H RX IP 250 OP 636: Performed by: STUDENT IN AN ORGANIZED HEALTH CARE EDUCATION/TRAINING PROGRAM

## 2017-12-19 PROCEDURE — 00000146 ZZHCL STATISTIC GLUCOSE BY METER IP

## 2017-12-19 PROCEDURE — 97165 OT EVAL LOW COMPLEX 30 MIN: CPT | Mod: GO | Performed by: OCCUPATIONAL THERAPIST

## 2017-12-19 PROCEDURE — 27210429 ZZH NUTRITION PRODUCT INTERMEDIATE LITER

## 2017-12-19 PROCEDURE — 40000275 ZZH STATISTIC RCP TIME EA 10 MIN

## 2017-12-19 PROCEDURE — 83735 ASSAY OF MAGNESIUM: CPT | Performed by: STUDENT IN AN ORGANIZED HEALTH CARE EDUCATION/TRAINING PROGRAM

## 2017-12-19 PROCEDURE — 25000128 H RX IP 250 OP 636: Performed by: NURSE PRACTITIONER

## 2017-12-19 RX ORDER — LANOLIN ALCOHOL/MO/W.PET/CERES
3 CREAM (GRAM) TOPICAL AT BEDTIME
Status: DISCONTINUED | OUTPATIENT
Start: 2017-12-19 | End: 2017-12-20

## 2017-12-19 RX ORDER — DEXTROSE MONOHYDRATE 25 G/50ML
25-50 INJECTION, SOLUTION INTRAVENOUS
Status: DISCONTINUED | OUTPATIENT
Start: 2017-12-19 | End: 2017-12-22 | Stop reason: HOSPADM

## 2017-12-19 RX ORDER — DEXAMETHASONE SODIUM PHOSPHATE 4 MG/ML
4 INJECTION, SOLUTION INTRA-ARTICULAR; INTRALESIONAL; INTRAMUSCULAR; INTRAVENOUS; SOFT TISSUE EVERY 6 HOURS
Status: COMPLETED | OUTPATIENT
Start: 2017-12-19 | End: 2017-12-20

## 2017-12-19 RX ORDER — NICOTINE POLACRILEX 4 MG
15-30 LOZENGE BUCCAL
Status: DISCONTINUED | OUTPATIENT
Start: 2017-12-19 | End: 2017-12-22 | Stop reason: HOSPADM

## 2017-12-19 RX ADMIN — SENNOSIDES AND DOCUSATE SODIUM 1 TABLET: 8.6; 5 TABLET ORAL at 21:29

## 2017-12-19 RX ADMIN — VANCOMYCIN HYDROCHLORIDE 1250 MG: 10 INJECTION, POWDER, LYOPHILIZED, FOR SOLUTION INTRAVENOUS at 04:33

## 2017-12-19 RX ADMIN — SODIUM CHLORIDE: 9 INJECTION, SOLUTION INTRAVENOUS at 23:19

## 2017-12-19 RX ADMIN — DEXMEDETOMIDINE HYDROCHLORIDE 0.7 MCG/KG/HR: 4 INJECTION, SOLUTION INTRAVENOUS at 03:01

## 2017-12-19 RX ADMIN — INSULIN ASPART 1 UNITS: 100 INJECTION, SOLUTION INTRAVENOUS; SUBCUTANEOUS at 23:21

## 2017-12-19 RX ADMIN — DEXAMETHASONE SODIUM PHOSPHATE 4 MG: 4 INJECTION, SOLUTION INTRAMUSCULAR; INTRAVENOUS at 18:00

## 2017-12-19 RX ADMIN — ACETAMINOPHEN 650 MG: 325 TABLET, FILM COATED ORAL at 00:27

## 2017-12-19 RX ADMIN — PROPOFOL 5 MCG/KG/MIN: 10 INJECTION, EMULSION INTRAVENOUS at 07:54

## 2017-12-19 RX ADMIN — LEVETIRACETAM 1500 MG: 1500 INJECTION, SOLUTION INTRAVENOUS at 20:05

## 2017-12-19 RX ADMIN — LEVETIRACETAM 1500 MG: 1500 INJECTION, SOLUTION INTRAVENOUS at 08:08

## 2017-12-19 RX ADMIN — INSULIN ASPART 1 UNITS: 100 INJECTION, SOLUTION INTRAVENOUS; SUBCUTANEOUS at 20:05

## 2017-12-19 RX ADMIN — HEPARIN SODIUM 5000 UNITS: 5000 INJECTION, SOLUTION INTRAVENOUS; SUBCUTANEOUS at 20:05

## 2017-12-19 RX ADMIN — SODIUM CHLORIDE: 9 INJECTION, SOLUTION INTRAVENOUS at 08:09

## 2017-12-19 RX ADMIN — DEXAMETHASONE SODIUM PHOSPHATE 4 MG: 4 INJECTION, SOLUTION INTRAMUSCULAR; INTRAVENOUS at 12:38

## 2017-12-19 RX ADMIN — ACETAMINOPHEN 650 MG: 325 TABLET, FILM COATED ORAL at 21:29

## 2017-12-19 RX ADMIN — PIPERACILLIN SODIUM AND TAZOBACTAM SODIUM 4.5 G: 36; 4.5 INJECTION, POWDER, LYOPHILIZED, FOR SOLUTION INTRAVENOUS at 12:39

## 2017-12-19 RX ADMIN — PANTOPRAZOLE SODIUM 40 MG: 40 TABLET, DELAYED RELEASE ORAL at 08:07

## 2017-12-19 RX ADMIN — HEPARIN SODIUM 5000 UNITS: 5000 INJECTION, SOLUTION INTRAVENOUS; SUBCUTANEOUS at 08:49

## 2017-12-19 RX ADMIN — MULTIVITAMIN 15 ML: LIQUID ORAL at 08:07

## 2017-12-19 RX ADMIN — MELATONIN TAB 3 MG 3 MG: 3 TAB at 21:29

## 2017-12-19 RX ADMIN — POTASSIUM CHLORIDE 20 MEQ: 1.5 POWDER, FOR SOLUTION ORAL at 05:59

## 2017-12-19 RX ADMIN — PIPERACILLIN SODIUM AND TAZOBACTAM SODIUM 4.5 G: 36; 4.5 INJECTION, POWDER, LYOPHILIZED, FOR SOLUTION INTRAVENOUS at 18:00

## 2017-12-19 RX ADMIN — PIPERACILLIN SODIUM AND TAZOBACTAM SODIUM 4.5 G: 36; 4.5 INJECTION, POWDER, LYOPHILIZED, FOR SOLUTION INTRAVENOUS at 06:43

## 2017-12-19 RX ADMIN — PIPERACILLIN SODIUM AND TAZOBACTAM SODIUM 4.5 G: 36; 4.5 INJECTION, POWDER, LYOPHILIZED, FOR SOLUTION INTRAVENOUS at 00:31

## 2017-12-19 RX ADMIN — FENTANYL CITRATE 50 MCG/HR: 50 INJECTION, SOLUTION INTRAMUSCULAR; INTRAVENOUS at 19:36

## 2017-12-19 ASSESSMENT — VISUAL ACUITY
OU: OTHER (SEE COMMENT)

## 2017-12-19 NOTE — PLAN OF CARE
Problem: Patient Care Overview  Goal: Plan of Care/Patient Progress Review  4A PT: HOLD- per discussion with OT, patient most likely will be independent with mobility following extubation (stood and transferred with SBA only for line management). Anticipate will have no PT needs but will hold to ensure no balance deficits following OOR mobility.

## 2017-12-19 NOTE — PROCEDURES
EEG#:  [id number]       VIDEO EEG DAY #3      DATE OF STUDY:  12/18/2017.      SOURCE FILE DURATION:  23 hours and 58 minutes.      PATIENT INFORMATION:  Bobby Howard is a 27-year-old female with a history of polysubstance abuse, presented to the hospital with status epilepticus.  Over the last several weeks, patient had episodes of staring spells and a generalized tonic-clonic seizure.  EEG is being done to evaluate for seizures.  The patient is on levetiracetam 1500 mg twice a day and fentanyl drip and propofol 50 mcg per kilogram per minute drip.     TECHNICAL SUMMARY: This video EEG monitoring procedure was performed with 23 scalp electrodes in 10-20 system placements, and additional scalp, precordial and other surface electrodes used for electrical referencing and artifact detection. No electroclinical seizures or any electrographic seizures were seen. Video was reviewed intermittently by EEG technologist and physician for clinical seizures.      EEG FINDING:  The patient has diffuse generalized slowing with superimposed faster frequencies in the alpha range concentrated in the midline and parasagittal chain.  She does have electromyographic artifact noted throughout sporadically throughout the recording.  There is not well-formed parietooccipital rhythm or sleep-wake distinction.  Well-formed sleep architecture was not seen.  The patient had some sharp transients identified multiple times, specific times were 20:40:14, 20:40:59.  These discharges appear to maximum negativity at T6, O2 and P4.  No well-formed aftergoing slow wave was identified.  These discharges are not persistent.  Of note, patient is lying in a position where the is a right temporal electrodes may possibly hit the pillow, sometimes creating an artifactual finding.  It would be helpful to record more EEG data to determine if this is a persistent finding.      ICTAL:  No electrographic seizures.      IMPRESSION:  Video EEG day 3 is abnormal  due to the presence of diffuse generalized delta-theta slowing with superimposed faster frequencies in the alpha range.  This is consistent with a severe encephalopathy.  The patient did have some sharp transients noted in the right temporal region be helpful to gather more data to determine if these are recurrent epileptiform discharges or artifact.  No clear electrographic seizures were seen in this record.  Clinical correlation is advised.         KIM GOODRICH MD             D: 2017 15:13   T: 2017 16:32   MT: CD      Name:     NAEEM OLIVARES   MRN:      [id number]        Account:        [id number]   :      1990           Procedure Date: 2017      Document: [id number]

## 2017-12-19 NOTE — PLAN OF CARE
Problem: Patient Care Overview  Goal: Plan of Care/Patient Progress Review  Outcome: Improving  Neuro: Pt opens eyes spontaneously and KENT to command. Nods/gestures appropriately. Pupils equal and reactive. Cont. EEG in place. No siezure activity witnessed.  CV: Pt in ST at beginning of shift but now NSR in the 80's. TMAX 102.9 now afebrile. Normotensive. Minimal edema.  R: Pt remains on minimal vent settings. Suctioning large amounts of creamy yellow sputum from ETT. BBS coarse throughout.  GI/: TF advanced to 40mL/hr. Tolerating well. No BM. UOP adequate per frausto.  Skin: Intact.  Gtt's: Fentanyl, Precedex,   Plan: Continue plan of care. Wean vent as able.

## 2017-12-19 NOTE — PLAN OF CARE
Problem: Restraint for Non-Violent/Non-Self-Destructive Behavior  Goal: Prevent/Manage Potential Problems  Maintain safety of patient and others during period of restraint.  Promote psychological and physical wellbeing.  Prevent injury to skin and involved body parts.  Promote nutrition, hydration, and elimination.   Outcome: No Change  Pt remains intubated with sedation. Russell soft wrist restraints continued for safety.

## 2017-12-19 NOTE — PLAN OF CARE
Problem: Patient Care Overview  Goal: Plan of Care/Patient Progress Review  Received report from day shift RN at 1830. Patient hemodynamically stable while sedated on video EEG with precedex at 0.6 and propofol at 5. Transitioning off propofol to precedex. Follows commands. q2hr neuro's. Sinus tach 120-140. Febrile with tmax of 103.0, tylenol given and pan cultures sent. Prasad with good UO. OG with TF at 20 (55goal) advacent o 30 at 2200 with 30cc q 4hr flushes.   CMV 40%, 14, 450, 5. Thick yellow secretions. See flowsheets for skin issues.

## 2017-12-19 NOTE — MR AVS SNAPSHOT
After Visit Summary   2017    Bobby Howard    MRN: [id number]           Patient Information     Date Of Birth          1990        Visit Information        Provider Department      2017 7:00 AM [facility] EEG TECH 4 [facility] EEG        Today's Diagnoses     Status epilepticus, generalized convulsive (H)    -  1       Follow-ups after your visit        Who to contact     Please call your clinic at [phone] to:    Ask questions about your health    Make or cancel appointments    Discuss your medicines    Learn about your test results    Speak to your doctor   If you have compliments or concerns about an experience at your clinic, or if you wish to file a complaint, please contact [facility] Patient Relations at [phone] or email us at [email or web address]         Additional Information About Your Visit        MyChart Information     [employer] is an electronic gateway that provides easy, online access to your medical records. With 36K[employer], you can request a clinic appointment, read your test results, renew a prescription or communicate with your care team.     To sign up for [employer] visit the website at [email or web address]   You will be asked to enter the access code listed below, as well as some personal information. Please follow the directions to create your username and password.     Your access code is: [email or web address]  Expires: 3/16/2018 10:28 PM     Your access code will  in 90 days. If you need help or a new code, please contact your [facility] or call [phone] for assistance.        Care EveryWhere ID     This is your Care EveryWhere ID. This could be used by other organizations to access your [place] medical records  [id number]         Blood Pressure from Last 3 Encounters:   17 115/72    Weight from Last 3 Encounters:   17 63.3 kg (139 lb 8.8 oz)              We Performed the Following      Glucose by meter          Today's Medication Changes      Notice     This visit is during an admission. Changes to the med list made in this visit will be reflected in the After Visit Summary of the admission.             Primary Care Provider Fax #    Physician No Ref-Primary [phone]       No address on file        Equal Access to Services     RADHA MAL : Hadii aad ku haddavidfrederick Sogabbyali, wamukulda luqadaha, qaybta kaalmada adeerasmo, shana sibley georgedax lemaedvinavtar pichardo. So [facility] [phone].    ATENCIÓN: Si habla español, tiene a hairston disposición servicios gratuitos de asistencia lingüística. Llame al [phone].    We comply with applicable federal civil rights laws and [place] laws. We do not discriminate on the basis of race, color, national origin, age, disability, sex, sexual orientation, or gender identity.            Thank you!     Thank you for choosing [facility]  for your care. Our goal is always to provide you with excellent care. Hearing back from our patients is one way we can continue to improve our services. Please take a few minutes to complete the written survey that you may receive in the mail after your visit with us. Thank you!             Your Updated Medication List - Protect others around you: Learn how to safely use, store and throw away your medicines at www.disposemymeds.org.      Notice     This visit is during an admission. Changes to the med list made in this visit will be reflected in the After Visit Summary of the admission.

## 2017-12-19 NOTE — PROGRESS NOTES
12/19/17 1011   Quick Adds   Type of Visit Initial Occupational Therapy Evaluation   Living Environment   Lives With parent(s)  (with dad)   Living Arrangements house   Home Accessibility stairs to enter home   Number of Stairs to Enter Home 2   Number of Stairs Within Home (Pt unable to give answer)   Transportation Available car   Living Environment Comment fathers girlfriend is home to help prn   Self-Care   Dominant Hand right   Usual Activity Tolerance good   Current Activity Tolerance fair   Regular Exercise no   Equipment Currently Used at Home none   Activity/Exercise/Self-Care Comment Pt not working   Functional Level Prior   Ambulation 0-->independent   Transferring 0-->independent   Toileting 0-->independent   Bathing 0-->independent   Dressing 0-->independent   Eating 0-->independent   Communication 0-->understands/communicates without difficulty   Swallowing 0-->swallows foods/liquids without difficulty   Cognition 0 - no cognition issues reported   Fall history within last six months no   Which of the above functional risks had a recent onset or change? none   Prior Functional Level Comment most information from family 2/2 pt intubated   General Information   Onset of Illness/Injury or Date of Surgery - Date 12/16/17   Referring Physician Ruel Geiger MD   Patient/Family Goals Statement To get home and be Ind   Additional Occupational Profile Info/Pertinent History of Current Problem Bobby Howard is a 27 year old h/o polysubstance abuse admitted 12/16/2017 with new onset status epilepticus. Unclear etiology at this point. 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 Lumbar puncture showed normal glucose, protein, and no WBCs or RBCS. Viral panel is still pending.    Precautions/Limitations  seizure precautions  (VEEG, vent, cpap, 50% fiO2, peep 5)   Cognitive Status Examination   Orientation orientation to person, place and time   Level of Consciousness alert   Visual Perception   Visual Perception No deficits were identified   Sensory Examination   Sensory Comments Pt denies deficits   Pain Assessment   Patient Currently in Pain No   Integumentary/Edema   Integumentary/Edema no deficits were identifed   Range of Motion (ROM)   ROM Comment WNL   Strength   Strength Comments WFL, some generalized weakness   Muscle Tone Assessment   Muscle Tone Comments WNL   Coordination   Upper Extremity Coordination No deficits were identified   Transfer Skill: Sit to Stand   Level of Grand: Sit/Stand stand-by assist   Upper Body Dressing   Level of Grand: Dress Upper Body stand-by assist   Lower Body Dressing   Level of Grand: Dress Lower Body stand-by assist   Grooming   Level of Grand: Grooming stand-by assist   Instrumental Activities of Daily Living (IADL)   Previous Responsibilities (pt did some, had help from father)   Activities of Daily Living Analysis   Impairments Contributing to Impaired Activities of Daily Living (seizures, intubated)   General Therapy Interventions   Planned Therapy Interventions ADL retraining;home program guidelines;progressive activity/exercise   Clinical Impression   Criteria for Skilled Therapeutic Interventions Met yes, treatment indicated   OT Diagnosis decreased ADL I and tolerance   Influenced by the following impairments intubated, deconditioning, seizure precautions   Assessment of Occupational Performance 1-3 Performance Deficits   Identified Performance Deficits home mgmt, leisure   Clinical Decision Making (Complexity) Low complexity   Therapy Frequency daily   Predicted Duration of Therapy Intervention (days/wks) 12/26/17   Anticipated Equipment Needs at Discharge (none)   Anticipated Discharge Disposition Home with Assist  (A from family)  "  Risks and Benefits of Treatment have been explained. Yes   Patient, Family & other staff in agreement with plan of care Yes   Clinical Impression Comments Pt would benefit from skilled OT to help increase ADL I and tolerance.   [facility] TM \"6 Clicks\"   2016, Trustees of [facility], under license to [employer].  All rights reserved.   6 Clicks Short Forms Daily Activity Inpatient Short Form   [facility] AM-PAC  \"6 Clicks\" Daily Activity Inpatient Short Form   1. Putting on and taking off regular lower body clothing? 3 - A Little   2. Bathing (including washing, rinsing, drying)? 3 - A Little   3. Toileting, which includes using toilet, bedpan or urinal? 3 - A Little   4. Putting on and taking off regular upper body clothing? 3 - A Little   5. Taking care of personal grooming such as brushing teeth? 4 - None   6. Eating meals? 1 - Total   Daily Activity Raw Score (Score out of 24.Lower scores equate to lower levels of function) 17   Total Evaluation Time   Total Evaluation Time (Minutes) 10     "
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
## 2017-12-19 NOTE — PLAN OF CARE
Problem: Patient Care Overview  Goal: Plan of Care/Patient Progress Review  Discharge Planner OT   Patient plan for discharge: Home  Current status: Pt intubated, deconditioned. With Min A, pt able to tx to the EOB and stood. SBA for g/h tasks.  Barriers to return to prior living situation: Intubated, deconditioning, seizure precautions  Recommendations for discharge: home with A from family   Rationale for recommendations: Anticipate pt will be able to complete ADLs safely with A from family at time of discharge.        Entered by: Dandy Hicks 12/19/2017 1:46 PM   4A

## 2017-12-20 ENCOUNTER — APPOINTMENT (OUTPATIENT)
Dept: SPEECH THERAPY | Facility: CLINIC | Age: 27
End: 2017-12-20
Attending: PSYCHIATRY & NEUROLOGY
Payer: MEDICAID

## 2017-12-20 ENCOUNTER — ALLIED HEALTH/NURSE VISIT (OUTPATIENT)
Dept: NEUROLOGY | Facility: CLINIC | Age: 27
End: 2017-12-20
Attending: PSYCHIATRY & NEUROLOGY
Payer: MEDICAID

## 2017-12-20 DIAGNOSIS — R56.9 SEIZURES (H): Primary | ICD-10-CM

## 2017-12-20 LAB
ALB CSF/SERPL: 3.8 RATIO (ref 0–9)
ALB CSF/SERPL: 3.9 RATIO (ref 0–9)
ALBUMIN CSF-MCNC: 14 MG/DL (ref 0–35)
ALBUMIN CSF-MCNC: 15 MG/DL (ref 0–35)
ALBUMIN SERPL-MCNC: 3600 MG/DL (ref 3500–5200)
ALBUMIN SERPL-MCNC: 3970 MG/DL (ref 3500–5200)
ANION GAP SERPL CALCULATED.3IONS-SCNC: 9 MMOL/L (ref 3–14)
BUN SERPL-MCNC: 11 MG/DL (ref 7–30)
CALCIUM SERPL-MCNC: 8.6 MG/DL (ref 8.5–10.1)
CHLORIDE SERPL-SCNC: 114 MMOL/L (ref 94–109)
CO2 SERPL-SCNC: 22 MMOL/L (ref 20–32)
CREAT SERPL-MCNC: 0.46 MG/DL (ref 0.52–1.04)
DIAGNOSTIC IMP SPEC-IMP: NOT DETECTED
EBV DNA # SPEC NAA+PROBE: <390 CPY/ML
EBV DNA SPEC NAA+PROBE-LOG#: <2.6 LOG
ERYTHROCYTE [DISTWIDTH] IN BLOOD BY AUTOMATED COUNT: 12.5 % (ref 10–15)
GFR SERPL CREATININE-BSD FRML MDRD: >90 ML/MIN/1.7M2
GLUCOSE BLDC GLUCOMTR-MCNC: 131 MG/DL (ref 70–99)
GLUCOSE BLDC GLUCOMTR-MCNC: 132 MG/DL (ref 70–99)
GLUCOSE BLDC GLUCOMTR-MCNC: 136 MG/DL (ref 70–99)
GLUCOSE BLDC GLUCOMTR-MCNC: 179 MG/DL (ref 70–99)
GLUCOSE BLDC GLUCOMTR-MCNC: 183 MG/DL (ref 70–99)
GLUCOSE BLDC GLUCOMTR-MCNC: 214 MG/DL (ref 70–99)
GLUCOSE SERPL-MCNC: 189 MG/DL (ref 70–99)
HCT VFR BLD AUTO: 30 % (ref 35–47)
HGB BLD-MCNC: 10.2 G/DL (ref 11.7–15.7)
IGG CSF-MCNC: 5.3 MG/DL (ref 0–6)
IGG CSF-MCNC: 5.9 MG/DL (ref 0–6)
IGG SERPL-MCNC: 683 MG/DL (ref 768–1632)
IGG SERPL-MCNC: 686 MG/DL (ref 768–1632)
IGG SYNTH RATE SER+CSF CALC-MRATE: 18.1 MG/D
IGG SYNTH RATE SER+CSF CALC-MRATE: 21.3 MG/D
IGG/ALB CLEAR SER+CSF-RTO: 2 RATIO (ref 0.28–0.66)
IGG/ALB CLEAR SER+CSF-RTO: 2.28 RATIO (ref 0.28–0.66)
IGG/ALB CSF: 0.38 RATIO (ref 0.09–0.25)
IGG/ALB CSF: 0.39 RATIO (ref 0.09–0.25)
LAB SCANNED RESULT: NORMAL
MAGNESIUM SERPL-MCNC: 1.9 MG/DL (ref 1.6–2.3)
MCH RBC QN AUTO: 31.1 PG (ref 26.5–33)
MCHC RBC AUTO-ENTMCNC: 34 G/DL (ref 31.5–36.5)
MCV RBC AUTO: 92 FL (ref 78–100)
MPX SERIES: NONREACTIVE
OLIGOCLONAL BANDS CSF ELPH-IMP: ABNORMAL
OLIGOCLONAL BANDS CSF ELPH-IMP: POSITIVE
OLIGOCLONAL BANDS CSF IEF: 13 BANDS (ref 0–1)
PHOSPHATE SERPL-MCNC: 1.6 MG/DL (ref 2.5–4.5)
PLATELET # BLD AUTO: 234 10E9/L (ref 150–450)
POTASSIUM SERPL-SCNC: 3.6 MMOL/L (ref 3.4–5.3)
RBC # BLD AUTO: 3.28 10E12/L (ref 3.8–5.2)
SODIUM SERPL-SCNC: 145 MMOL/L (ref 133–144)
SPECIMEN SOURCE: NORMAL
SPECIMEN SOURCE: NORMAL
WBC # BLD AUTO: 8.1 10E9/L (ref 4–11)
WNV RNA SER QL NAA+PROBE: NOT DETECTED
WNV RNA SPEC QL NAA+PROBE: NONREACTIVE

## 2017-12-20 PROCEDURE — 80048 BASIC METABOLIC PNL TOTAL CA: CPT | Performed by: STUDENT IN AN ORGANIZED HEALTH CARE EDUCATION/TRAINING PROGRAM

## 2017-12-20 PROCEDURE — 25000128 H RX IP 250 OP 636: Performed by: STUDENT IN AN ORGANIZED HEALTH CARE EDUCATION/TRAINING PROGRAM

## 2017-12-20 PROCEDURE — 36415 COLL VENOUS BLD VENIPUNCTURE: CPT | Performed by: PSYCHIATRY & NEUROLOGY

## 2017-12-20 PROCEDURE — 80048 BASIC METABOLIC PNL TOTAL CA: CPT | Performed by: PSYCHIATRY & NEUROLOGY

## 2017-12-20 PROCEDURE — 20000004 ZZH R&B ICU UMMC

## 2017-12-20 PROCEDURE — 25000125 ZZHC RX 250: Performed by: STUDENT IN AN ORGANIZED HEALTH CARE EDUCATION/TRAINING PROGRAM

## 2017-12-20 PROCEDURE — 25000132 ZZH RX MED GY IP 250 OP 250 PS 637: Performed by: NURSE PRACTITIONER

## 2017-12-20 PROCEDURE — 25000128 H RX IP 250 OP 636: Performed by: NURSE PRACTITIONER

## 2017-12-20 PROCEDURE — 00000146 ZZHCL STATISTIC GLUCOSE BY METER IP

## 2017-12-20 PROCEDURE — 92610 EVALUATE SWALLOWING FUNCTION: CPT | Mod: GN

## 2017-12-20 PROCEDURE — 25000132 ZZH RX MED GY IP 250 OP 250 PS 637: Performed by: STUDENT IN AN ORGANIZED HEALTH CARE EDUCATION/TRAINING PROGRAM

## 2017-12-20 PROCEDURE — 94003 VENT MGMT INPAT SUBQ DAY: CPT

## 2017-12-20 PROCEDURE — 85027 COMPLETE CBC AUTOMATED: CPT | Performed by: PSYCHIATRY & NEUROLOGY

## 2017-12-20 PROCEDURE — 40000275 ZZH STATISTIC RCP TIME EA 10 MIN

## 2017-12-20 PROCEDURE — 25000132 ZZH RX MED GY IP 250 OP 250 PS 637: Performed by: PSYCHIATRY & NEUROLOGY

## 2017-12-20 PROCEDURE — 40000225 ZZH STATISTIC SLP WARD VISIT

## 2017-12-20 PROCEDURE — 83735 ASSAY OF MAGNESIUM: CPT | Performed by: PSYCHIATRY & NEUROLOGY

## 2017-12-20 PROCEDURE — 25000128 H RX IP 250 OP 636

## 2017-12-20 PROCEDURE — 95951 ZZHC EEG VIDEO EACH 24 HR: CPT | Mod: ZF

## 2017-12-20 PROCEDURE — 84100 ASSAY OF PHOSPHORUS: CPT | Performed by: PSYCHIATRY & NEUROLOGY

## 2017-12-20 PROCEDURE — 85049 AUTOMATED PLATELET COUNT: CPT | Performed by: PSYCHIATRY & NEUROLOGY

## 2017-12-20 RX ORDER — NICOTINE 21 MG/24HR
1 PATCH, TRANSDERMAL 24 HOURS TRANSDERMAL DAILY
Status: DISCONTINUED | OUTPATIENT
Start: 2017-12-20 | End: 2017-12-22 | Stop reason: HOSPADM

## 2017-12-20 RX ORDER — SODIUM CHLORIDE 9 MG/ML
INJECTION, SOLUTION INTRAVENOUS
Status: COMPLETED
Start: 2017-12-20 | End: 2017-12-20

## 2017-12-20 RX ORDER — DEXAMETHASONE SODIUM PHOSPHATE 4 MG/ML
4 INJECTION, SOLUTION INTRA-ARTICULAR; INTRALESIONAL; INTRAMUSCULAR; INTRAVENOUS; SOFT TISSUE EVERY 6 HOURS
Status: DISCONTINUED | OUTPATIENT
Start: 2017-12-20 | End: 2017-12-20

## 2017-12-20 RX ADMIN — MULTIVITAMIN 15 ML: LIQUID ORAL at 08:12

## 2017-12-20 RX ADMIN — PANTOPRAZOLE SODIUM 40 MG: 40 TABLET, DELAYED RELEASE ORAL at 08:12

## 2017-12-20 RX ADMIN — LEVETIRACETAM 1500 MG: 1500 INJECTION, SOLUTION INTRAVENOUS at 19:45

## 2017-12-20 RX ADMIN — POTASSIUM CHLORIDE 20 MEQ: 1.5 POWDER, FOR SOLUTION ORAL at 06:10

## 2017-12-20 RX ADMIN — DEXAMETHASONE SODIUM PHOSPHATE 4 MG: 4 INJECTION, SOLUTION INTRAMUSCULAR; INTRAVENOUS at 09:11

## 2017-12-20 RX ADMIN — DEXAMETHASONE SODIUM PHOSPHATE 4 MG: 4 INJECTION, SOLUTION INTRAMUSCULAR; INTRAVENOUS at 06:10

## 2017-12-20 RX ADMIN — POTASSIUM PHOSPHATE, MONOBASIC AND POTASSIUM PHOSPHATE, DIBASIC 20 MMOL: 224; 236 INJECTION, SOLUTION INTRAVENOUS at 10:59

## 2017-12-20 RX ADMIN — HEPARIN SODIUM 5000 UNITS: 5000 INJECTION, SOLUTION INTRAVENOUS; SUBCUTANEOUS at 20:01

## 2017-12-20 RX ADMIN — SODIUM CHLORIDE: 9 INJECTION, SOLUTION INTRAVENOUS at 19:44

## 2017-12-20 RX ADMIN — PIPERACILLIN SODIUM AND TAZOBACTAM SODIUM 4.5 G: 36; 4.5 INJECTION, POWDER, LYOPHILIZED, FOR SOLUTION INTRAVENOUS at 18:16

## 2017-12-20 RX ADMIN — INSULIN ASPART 1 UNITS: 100 INJECTION, SOLUTION INTRAVENOUS; SUBCUTANEOUS at 11:34

## 2017-12-20 RX ADMIN — LEVETIRACETAM 1500 MG: 1500 INJECTION, SOLUTION INTRAVENOUS at 08:11

## 2017-12-20 RX ADMIN — DEXAMETHASONE SODIUM PHOSPHATE 4 MG: 4 INJECTION, SOLUTION INTRAMUSCULAR; INTRAVENOUS at 00:10

## 2017-12-20 RX ADMIN — NICOTINE 1 PATCH: 21 PATCH TRANSDERMAL at 18:16

## 2017-12-20 RX ADMIN — Medication 2 G: at 08:26

## 2017-12-20 RX ADMIN — PIPERACILLIN SODIUM AND TAZOBACTAM SODIUM 4.5 G: 36; 4.5 INJECTION, POWDER, LYOPHILIZED, FOR SOLUTION INTRAVENOUS at 23:57

## 2017-12-20 RX ADMIN — PIPERACILLIN SODIUM AND TAZOBACTAM SODIUM 4.5 G: 36; 4.5 INJECTION, POWDER, LYOPHILIZED, FOR SOLUTION INTRAVENOUS at 06:10

## 2017-12-20 RX ADMIN — INSULIN ASPART 1 UNITS: 100 INJECTION, SOLUTION INTRAVENOUS; SUBCUTANEOUS at 03:33

## 2017-12-20 RX ADMIN — PIPERACILLIN SODIUM AND TAZOBACTAM SODIUM 4.5 G: 36; 4.5 INJECTION, POWDER, LYOPHILIZED, FOR SOLUTION INTRAVENOUS at 11:32

## 2017-12-20 RX ADMIN — Medication 3 MG: at 19:47

## 2017-12-20 RX ADMIN — PIPERACILLIN SODIUM AND TAZOBACTAM SODIUM 4.5 G: 36; 4.5 INJECTION, POWDER, LYOPHILIZED, FOR SOLUTION INTRAVENOUS at 00:10

## 2017-12-20 RX ADMIN — HEPARIN SODIUM 5000 UNITS: 5000 INJECTION, SOLUTION INTRAVENOUS; SUBCUTANEOUS at 08:13

## 2017-12-20 RX ADMIN — INSULIN ASPART 1 UNITS: 100 INJECTION, SOLUTION INTRAVENOUS; SUBCUTANEOUS at 08:10

## 2017-12-20 ASSESSMENT — VISUAL ACUITY
OU: OTHER (SEE COMMENT)
OU: NORMAL ACUITY
OU: OTHER (SEE COMMENT)
OU: OTHER (SEE COMMENT)
OU: NORMAL ACUITY

## 2017-12-20 ASSESSMENT — ENCOUNTER SYMPTOMS: FEVER: 1

## 2017-12-20 NOTE — PLAN OF CARE
Problem: Patient Care Overview  Goal: Plan of Care/Patient Progress Review  Discharge Planner SLP   Patient plan for discharge: unknown  Current status: Clinical swallow evaluation completed per MD order. Pt presents with mild oral-pharyngeal dysphagia in the setting of lingual swelling post extubation. Pt demonstrated functional tolerance of thin liquids, purees, and solid textures. Independently taking small bites/sips. Pt with overt coughing x1 during session, but did not appear directly correlated with PO intake. Recommend regular diet with thin liquids as tolerated. Pt to sit upright for all PO intake, take small bites/sips and alternate consistencies. ST to follow for short tx course to ensure consistent tolerante of PO diet.  Barriers to return to prior living situation: none from ST perspective  Recommendations for discharge: home with assist  Rationale for recommendations: likely no ongoing ST needs post discharge       Entered by: Gwendolyn Alfredo 12/20/2017 3:58 PM

## 2017-12-20 NOTE — PLAN OF CARE
"Problem: Patient Care Overview  Goal: Plan of Care/Patient Progress Review  Outcome: Improving           Nursing Progress Note    D/I/A: Pt A&Ox4. PERRL. Hand /ankle strength equal bilaterally (5/5) throughout. C/o mild HA. Fentanyl 50mcg/hr infusing with adequate pain relief. No seizure activity noted. CMV/AC Rate 14, TV 450mL, PEEP 5, FiO2 50%. PS 7/5 x4 hr this AM. No cuff leak present, Unable to extubate today. Started on Decadron. LLL coarse, large amt thick yellow secretions; improving throughout shift. Suctioned frequently. Sinus rhythm to sinus tachycardic, no ectopy noted. Normotensive. BG checked q4hr with most recent at 199; provider notified, sliding scale initiated. Currently awaiting Insulin pen. TF infusing now at goal 55 mL/hr. Afebrile. Repos q2hr with two assist. Up at edge of bed, stood at side x2 with two assist.   P: Prepare for possible extubation in AM. Continue to monitor and assess. Update POC as needed.    BP (!) 134/95  Temp 97.7  F (36.5  C) (Axillary)  Resp 22  Ht 1.626 m (5' 4\")  Wt 63.3 kg (139 lb 8.8 oz)  SpO2 100%  BMI 23.95 kg/m2  Jeannejoni Yan  December 19, 2017  5:49 PM        "

## 2017-12-20 NOTE — MR AVS SNAPSHOT
After Visit Summary   2017    Bobby Howard    MRN: [id number]           Patient Information     Date Of Birth          1990        Visit Information        Provider Department      2017 7:00 AM [facility] EEG TECH 4 [facility] EEG        Today's Diagnoses     Seizures (H)    -  1       Follow-ups after your visit        Your next 10 appointments already scheduled     Dec 21, 2017  7:00 AM CST   24 Hour Video Visit with [facility] EEG TECH 4   [facility] ([facility])    [facility]  [address] [id number] [phone]           [place]: Your appointment is scheduled at [facility]. [address] [place] [address]              Who to contact     Please call your clinic at [phone] to:    Ask questions about your health    Make or cancel appointments    Discuss your medicines    Learn about your test results    Speak to your doctor   If you have compliments or concerns about an experience at your clinic, or if you wish to file a complaint, please contact [facility] Patient Relations at [phone] or email us at [email or web address]         Additional Information About Your Visit        MyChart Information     [employer] is an electronic gateway that provides easy, online access to your medical records. With [employer], you can request a clinic appointment, read your test results, renew a prescription or communicate with your care team.     To sign up for [facility] visit the website at [email or web address]   You will be asked to enter the access code listed below, as well as some personal information. Please follow the directions to create your username and password.     Your access code is: [email or web address]  Expires: 3/16/2018 10:28 PM     Your access code will  in 90 days. If you need help or a new code, please contact your [facility]  Clinic or call [phone] for assistance.        Care EveryWhere ID     This is your Care EveryWhere ID. This could be used by other organizations to access your [facility] medical records  [id number]         Blood Pressure from Last 3 Encounters:   12/20/17 (!) 136/91    Weight from Last 3 Encounters:   12/20/17 61 kg (134 lb 7.7 oz)              We Performed the Following     Glucose by meter     Glucose by meter          Today's Medication Changes      Notice     This visit is during an admission. Changes to the med list made in this visit will be reflected in the After Visit Summary of the admission.             Primary Care Provider Fax #    Physician No Ref-Primary [phone]       No address on file        Equal Access to Services     RADHA RESENDEZ : Mateus Daniel, sabrina connolly, nelly zimmer, shana ruiz . So [facility] [phone].    ATENCIÓN: Si habla español, tiene a hairston disposición servicios gratuitos de asistencia lingüística. Llame al [phone].    We comply with applicable federal civil rights laws and [place] laws. We do not discriminate on the basis of race, color, national origin, age, disability, sex, sexual orientation, or gender identity.            Thank you!     Thank you for choosing [facility]  for your care. Our goal is always to provide you with excellent care. Hearing back from our patients is one way we can continue to improve our services. Please take a few minutes to complete the written survey that you may receive in the mail after your visit with us. Thank you!             Your Updated Medication List - Protect others around you: Learn how to safely use, store and throw away your medicines at www.disposemymeds.org.      Notice     This visit is during an admission. Changes to the med list made in this visit will be reflected in the After Visit Summary of the admission.

## 2017-12-20 NOTE — PROGRESS NOTES
Preliminary Video EEG impression on December 19-20, 2017  Until 6am   Full report to follow.     This video EEG is abnormal due to the presences of intermittent generalized polymorphic delta slowing. Patient is more awake on this recording, posterior dominant rhythm 7-8 hz, blinking, moving around.     This EEG is consistent with a mild-moderate diffuse encephalopathy. No epileptiform discharges or electrographic seizures were seen in this record. If events of interest are noted, please press the event button. Clinical correlation is advised.      Dolly Lloyd MD  Staff Epilepsy Neurologist   [phone]

## 2017-12-20 NOTE — PLAN OF CARE
Problem: Patient Care Overview  Goal: Plan of Care/Patient Progress Review    OT-4a: Cx- approached pt for therapy around noon, but pt declining, recently extubated this AM.

## 2017-12-20 NOTE — CONSULTS
Social Work Services Progress Note    Hospital Day: 5  Date of Initial Social Work Evaluation:  12/18/17  Collaborated with:  Chart review, attended rounds, neuro team     Data:  Received SW consult due to psychosocial concerns and family dynamics. Attempted to meet with pt to follow up, but she was receiving cares from a staff member and they asked that SW return at another time. Will try to follow up tmrw.     Intervention:  NA     Assessment:  NA    Plan:    Anticipated Disposition:  Home    Barriers to d/c plan:  Medical stability     Follow Up:  SW will continue to follow, support and assist with ongoing social service and discharge planning needs.     DONA Abel, [facility]  ICU Float   Pager: [phone]  [email or web address]     NO LETTER

## 2017-12-20 NOTE — PROGRESS NOTES
Care Coordinator Progress Note     Admission Date/Time:  12/16/2017  Attending MD:  Iglesia Her MD     Data  Chart reviewed, discussed with interdisciplinary team.   Patient was admitted for: Acute on chronic respiratory failure with hypoxia (H).    Assessment  Nurse Care Coordination was consulted to assess needs of patient following stroke. Chart was reviewed and discussed with interdisciplinary team.  Pt got extubated this morning.  PT/OT are following and home with family is rec. at this time.  Attempt to visit pt to introduce self and for support.  Pt was sleeping at time of my visit and no family was in the room.  RNCC will cont to follow plan of care.     Plan  Anticipated Discharge Date:  TBD.  Anticipated Discharge Plan:   Home with family.  RNCC will cont to follow plan of care.      Delfino Meléndez RN, PHN, BSN  4A and 4E/ ICU  Care Coordinator  Phone: [phone]  Pager: [phone]

## 2017-12-20 NOTE — PROCEDURES
EEG #:        VIDEO EEG DAY 4      DATE OF RECORDIN2017      SOURCE FILE DURATION:  23 hours and 49 minutes.      PATIENT INFORMATION:  Bobby Howard is a 27-year-old female with history of polysubstance abuse who presented with status epilepticus.  EEG is being done to evaluate for seizures.      MEDICATIONS:  Levetiracetam 1500 mg twice a day drip, fentanyl 50 mcg per hour.  Propofol was discontinued at 12:19; Precedex was stopped at 09:31.     TECHNICAL SUMMARY: This video EEG monitoring procedure was performed with 23 scalp electrodes in 10-20 system placements, and additional scalp, precordial and other surface electrodes used for electrical referencing and artifact detection. No electroclinical seizures or any electrographic seizures were seen. Video was reviewed intermittently by EEG technologist and physician for clinical seizures.      BACKGROUND:  Prior to 7:00 a.m. patient's EEG consisted of diffuse generalized delta slowing with superimposed faster frequencies in the midline and parasagittal chain.  No well-formed parietooccipital rhythm.  Then there were a few segments at midnight where patient was more alert and had a well-formed parietooccipital rhythm of 7 Hz with intermittent bursts of delta slowing suggesting that she was waking up more and now after 7:00 in the morning, the patient's EEG does appear more active.  She is moving around in bed blinking her eyes.  She does have a parietooccipital rhythm of 7 Hz, normal mixed cerebral frequencies and bursts of intermittent generalized delta-theta slowing.      EPILEPTIFORM DISCHARGES:  None.      ICTAL:  None.      IMPRESSION:  VIDEO EEG day 4 is abnormal due to the presence of background slowing and intermittent generalized delta-theta slowing consistent with a moderate encephalopathy.  On this day of recording, the patient is more alert.  She does have a well-formed parietooccipital rhythm, spontaneous eye blinking and is moving  around often.  The patient is receiving low doses of Precedex and fentanyl and is intubated on this day of recording.  No electrographic seizures or epileptiform discharges are seen in the record as she is awakening.         KIM GOODRICH MD             D: 2017 12:11   T: 2017 13:21   MT: [id number]      Name:     NAEEM OLIVARES   MRN:      [id number]        Account:        [id number]   :      1990           Procedure Date: 2017      Document: [id number]

## 2017-12-20 NOTE — PROGRESS NOTES
[facility], [place]  Procedure Note          Extubation:       Bobby Howard  MRN# [id number]   December 20, 2017, 10:56 AM         Patient extubated at: December 20, 2017, 10:45 AM   Supplemental Oxygen: Via nasal cannula at 6 liters per minute   Cough: The cough is weak   Secretion Mode: Able to clear   Secretion Amount: Small amount, moderately thin and white / yellow in color   Respiratory Exam:: Breath sounds: equal and clear     Location: bilaterally   Skin Exam:: Patient color: pink   Patient Status: Currently appears comfortable   Arterial Blood Gasses:          Recorded by Ke Hunt, RRT
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
## 2017-12-20 NOTE — PROGRESS NOTES
12/20/17 1549   General Information   Onset Date 12/16/17   Start of Care Date 12/20/17   Referring Physician Iglesia Her MD   Patient Profile Review/OT: Additional Occupational Profile Info See Profile for full history and prior level of function   Patient/Family Goals Statement Pt wants to eat/drink; father present and supportive   Swallowing Evaluation Bedside swallow evaluation   Behaviorial Observations (Pt cooperative; follows commands)   Mode of current nutrition NPO   Respiratory Status Room air   Comments Orders received for swallow evaluation. Pt is a 27 year old year old female with a long history of polysubstance abuse admitted on 12/16/2017 for status epilepticus. Pt extubated at 11AM this morning   Clinical Swallow Evaluation   Oral Musculature (mild generalized weakness; lingual swelling)   Structural Abnormalities (mild lingual swelling)   Dentition present and adequate   Mucosal Quality dry   Mandibular Strength and Mobility intact   Oral Labial Strength and Mobility WFL   Lingual Strength and Mobility impaired protrusion;impaired anterior elevation;impaired left lateral movement;impaired right lateral movement;impaired coordination  (reduced strength/ROM in the presents of edema)   Velar Elevation intact   Buccal Strength and Mobility intact   Laryngeal Function Cough;Throat clear;Swallow;Dry swallow palpated  (essentially aphonic)   Additional Documentation Yes   Swallow Eval   Feeding Assistance set up only required   Clinical Swallow Eval: Thin Liquid Texture Trial   Mode of Presentation, Thin Liquids cup;spoon;straw;self-fed;fed by clinician   Volume of Liquid or Food Presented 6oz thin water   Oral Phase of Swallow WFL   Pharyngeal Phase of Swallow intact  (no overt s/sx of aspiration observed)   Diagnostic Statement swallow functional for thin liquids   Clinical Swallow Eval: Puree Solid Texture Trial   Mode of Presentation, Puree spoon;self-fed   Volume of Puree Presented 1oz in  very small bites   Oral Phase, Puree WFL   Pharyngeal Phase, Puree intact  (no overt s/sx of aspiration observed)   Diagnostic Statement swallow appears functional for small bites of puree textures   Clinical Swallow Eval: Solid Food Texture Trial   Mode of Presentation, Solid self-fed   Volume of Solid Food Presented 1 mita cracker in small bites   Oral Phase, Solid WFL  (mastication adequate for small amounts)   Pharyngeal Phase, Solid intact  (no overt s/sx of aspiration observed)   Diagnostic Statement swallow appears functional for solid textures with use of strategies   Swallow Compensations   Swallow Compensations Pacing;Reduce amounts  (Pt implementing independently)   Esophageal Phase of Swallow   Patient reports or presents with symptoms of esophageal dysphagia No   General Therapy Interventions   Planned Therapy Interventions Dysphagia Treatment   Dysphagia treatment Instruction of safe swallow strategies   Swallow Eval: Clinical Impressions   Skilled Criteria for Therapy Intervention Skilled criteria met.  Treatment indicated.   Functional Assessment Scale (FAS) 5   Treatment Diagnosis mild dysphagia   Diet texture recommendations Regular diet;Thin liquids   Recommended Feeding/Eating Techniques alternate between small bites and sips of food/liquid;maintain upright posture during/after eating for 30 mins;small sips/bites   Demonstrates Need for Referral to Another Service occupational therapy;physical therapy;respiratory therapy;dietitian   Therapy Frequency 5 times/wk   Predicted Duration of Therapy Intervention (days/wks) 1 week   Anticipated Discharge Disposition home w/ assist   Risks and Benefits of Treatment have been explained. Yes   Patient, family and/or staff in agreement with Plan of Care Yes   Clinical Impression Comments Clinical swallow evaluation completed per MD order. Pt presents with mild oral-pharyngeal dysphagia in the setting of lingual swelling post extubation. Pt demonstrated  functional tolerance of thin liquids, purees, and solid textures. Independently taking small bites/sips. Pt with overt coughing x1 during session, but did not appear directly correlated with PO intake. Recommend regular diet with thin liquids as tolerated. Pt to sit upright for all PO intake, take small bites/sips and alternate consistencies. ST to follow for short tx course to ensure consistent tolerante of PO diet.   Total Evaluation Time   Total Evaluation Time (Minutes) 18

## 2017-12-20 NOTE — PLAN OF CARE
Problem: Patient Care Overview  Goal: Plan of Care/Patient Progress Review  Outcome: Improving  Pt remains hemodynamically stable. No c/o pain/discomfort. Did c/o inability to sleep despite melatonin at HS; neuro-crit resident notified. No new orders, as pt is currently on EEG monitoring and team does not want pt sedated. Author sat with pt and pt was able to communicate with author via whiteboard. Able to get short periods of rest after talking with author. Will attempt pressure support this am. Father at bedside throughout NOC. Continuing to monitor.

## 2017-12-21 ENCOUNTER — APPOINTMENT (OUTPATIENT)
Dept: OCCUPATIONAL THERAPY | Facility: CLINIC | Age: 27
End: 2017-12-21
Attending: PSYCHIATRY & NEUROLOGY
Payer: MEDICAID

## 2017-12-21 ENCOUNTER — APPOINTMENT (OUTPATIENT)
Dept: SPEECH THERAPY | Facility: CLINIC | Age: 27
End: 2017-12-21
Attending: PSYCHIATRY & NEUROLOGY
Payer: MEDICAID

## 2017-12-21 ENCOUNTER — ALLIED HEALTH/NURSE VISIT (OUTPATIENT)
Dept: NEUROLOGY | Facility: CLINIC | Age: 27
End: 2017-12-21
Attending: PSYCHIATRY & NEUROLOGY
Payer: MEDICAID

## 2017-12-21 DIAGNOSIS — R56.9 SEIZURES (H): Primary | ICD-10-CM

## 2017-12-21 LAB
ANION GAP SERPL CALCULATED.3IONS-SCNC: 11 MMOL/L (ref 3–14)
BACTERIA SPEC CULT: ABNORMAL
BACTERIA SPEC CULT: ABNORMAL
BUN SERPL-MCNC: 14 MG/DL (ref 7–30)
CALCIUM SERPL-MCNC: 8.7 MG/DL (ref 8.5–10.1)
CHLORIDE SERPL-SCNC: 115 MMOL/L (ref 94–109)
CO2 SERPL-SCNC: 22 MMOL/L (ref 20–32)
CREAT SERPL-MCNC: 0.65 MG/DL (ref 0.52–1.04)
ERYTHROCYTE [DISTWIDTH] IN BLOOD BY AUTOMATED COUNT: 12.5 % (ref 10–15)
GFR SERPL CREATININE-BSD FRML MDRD: >90 ML/MIN/1.7M2
GLUCOSE BLDC GLUCOMTR-MCNC: 111 MG/DL (ref 70–99)
GLUCOSE SERPL-MCNC: 101 MG/DL (ref 70–99)
HCT VFR BLD AUTO: 31.8 % (ref 35–47)
HGB BLD-MCNC: 10.9 G/DL (ref 11.7–15.7)
JCPYV DNA SERPL QL NAA+PROBE: NOT DETECTED
MCH RBC QN AUTO: 31.5 PG (ref 26.5–33)
MCHC RBC AUTO-ENTMCNC: 34.3 G/DL (ref 31.5–36.5)
MCV RBC AUTO: 92 FL (ref 78–100)
PLATELET # BLD AUTO: 282 10E9/L (ref 150–450)
POTASSIUM SERPL-SCNC: 3 MMOL/L (ref 3.4–5.3)
POTASSIUM SERPL-SCNC: 3.1 MMOL/L (ref 3.4–5.3)
RBC # BLD AUTO: 3.46 10E12/L (ref 3.8–5.2)
SODIUM SERPL-SCNC: 148 MMOL/L (ref 133–144)
SPECIMEN SOURCE: ABNORMAL
SPECIMEN SOURCE: NORMAL
WBC # BLD AUTO: 11.8 10E9/L (ref 4–11)

## 2017-12-21 PROCEDURE — 25000132 ZZH RX MED GY IP 250 OP 250 PS 637: Performed by: STUDENT IN AN ORGANIZED HEALTH CARE EDUCATION/TRAINING PROGRAM

## 2017-12-21 PROCEDURE — 97530 THERAPEUTIC ACTIVITIES: CPT | Mod: GO | Performed by: OCCUPATIONAL THERAPIST

## 2017-12-21 PROCEDURE — 95951 ZZHC EEG VIDEO < 12 HR: CPT | Mod: 52,ZF

## 2017-12-21 PROCEDURE — 85027 COMPLETE CBC AUTOMATED: CPT | Performed by: PSYCHIATRY & NEUROLOGY

## 2017-12-21 PROCEDURE — 36415 COLL VENOUS BLD VENIPUNCTURE: CPT | Performed by: PSYCHIATRY & NEUROLOGY

## 2017-12-21 PROCEDURE — 25000132 ZZH RX MED GY IP 250 OP 250 PS 637: Performed by: PSYCHIATRY & NEUROLOGY

## 2017-12-21 PROCEDURE — 25000128 H RX IP 250 OP 636: Performed by: STUDENT IN AN ORGANIZED HEALTH CARE EDUCATION/TRAINING PROGRAM

## 2017-12-21 PROCEDURE — 92526 ORAL FUNCTION THERAPY: CPT | Mod: GN | Performed by: SPEECH-LANGUAGE PATHOLOGIST

## 2017-12-21 PROCEDURE — 40000225 ZZH STATISTIC SLP WARD VISIT: Performed by: SPEECH-LANGUAGE PATHOLOGIST

## 2017-12-21 PROCEDURE — 12000001 ZZH R&B MED SURG/OB UMMC

## 2017-12-21 PROCEDURE — 40000133 ZZH STATISTIC OT WARD VISIT: Performed by: OCCUPATIONAL THERAPIST

## 2017-12-21 PROCEDURE — 84132 ASSAY OF SERUM POTASSIUM: CPT | Performed by: PSYCHIATRY & NEUROLOGY

## 2017-12-21 PROCEDURE — 99222 1ST HOSP IP/OBS MODERATE 55: CPT | Performed by: PSYCHIATRY & NEUROLOGY

## 2017-12-21 PROCEDURE — 00000146 ZZHCL STATISTIC GLUCOSE BY METER IP

## 2017-12-21 PROCEDURE — 97535 SELF CARE MNGMENT TRAINING: CPT | Mod: GO | Performed by: OCCUPATIONAL THERAPIST

## 2017-12-21 PROCEDURE — 40000141 ZZH STATISTIC PERIPHERAL IV START W/O US GUIDANCE

## 2017-12-21 PROCEDURE — 80048 BASIC METABOLIC PNL TOTAL CA: CPT | Performed by: PSYCHIATRY & NEUROLOGY

## 2017-12-21 RX ORDER — POLYETHYLENE GLYCOL 3350 17 G
2 POWDER IN PACKET (EA) ORAL
Status: DISCONTINUED | OUTPATIENT
Start: 2017-12-21 | End: 2017-12-22 | Stop reason: HOSPADM

## 2017-12-21 RX ORDER — FOLIC ACID 1 MG/1
1 TABLET ORAL DAILY
Status: DISCONTINUED | OUTPATIENT
Start: 2017-12-21 | End: 2017-12-22 | Stop reason: HOSPADM

## 2017-12-21 RX ORDER — MULTIVITAMIN,THERAPEUTIC
1 TABLET ORAL DAILY
Qty: 30 TABLET | Refills: 0 | Status: SHIPPED | OUTPATIENT
Start: 2017-12-22 | End: 2018-01-04

## 2017-12-21 RX ORDER — LANOLIN ALCOHOL/MO/W.PET/CERES
100 CREAM (GRAM) TOPICAL DAILY
Status: DISCONTINUED | OUTPATIENT
Start: 2017-12-21 | End: 2017-12-22 | Stop reason: HOSPADM

## 2017-12-21 RX ORDER — LANOLIN ALCOHOL/MO/W.PET/CERES
100 CREAM (GRAM) TOPICAL DAILY
Qty: 30 TABLET | Refills: 0 | Status: SHIPPED | OUTPATIENT
Start: 2017-12-22 | End: 2018-01-04

## 2017-12-21 RX ORDER — MULTIVITAMIN,THERAPEUTIC
1 TABLET ORAL DAILY
Status: DISCONTINUED | OUTPATIENT
Start: 2017-12-21 | End: 2017-12-22 | Stop reason: HOSPADM

## 2017-12-21 RX ORDER — LEVETIRACETAM 750 MG/1
1500 TABLET ORAL 2 TIMES DAILY
Qty: 60 TABLET | Refills: 0 | Status: SHIPPED | OUTPATIENT
Start: 2017-12-21 | End: 2018-01-04

## 2017-12-21 RX ORDER — FOLIC ACID 1 MG/1
1 TABLET ORAL DAILY
Qty: 30 TABLET | Refills: 0 | Status: SHIPPED | OUTPATIENT
Start: 2017-12-22 | End: 2018-01-04

## 2017-12-21 RX ORDER — LANOLIN ALCOHOL/MO/W.PET/CERES
3 CREAM (GRAM) TOPICAL AT BEDTIME
Qty: 30 TABLET | Refills: 0 | Status: SHIPPED | OUTPATIENT
Start: 2017-12-21 | End: 2018-01-04

## 2017-12-21 RX ORDER — LEVETIRACETAM 750 MG/1
1500 TABLET ORAL 2 TIMES DAILY
Status: DISCONTINUED | OUTPATIENT
Start: 2017-12-21 | End: 2017-12-22 | Stop reason: HOSPADM

## 2017-12-21 RX ADMIN — POTASSIUM CHLORIDE 20 MEQ: 1.5 POWDER, FOR SOLUTION ORAL at 19:02

## 2017-12-21 RX ADMIN — Medication 10 MEQ: at 05:11

## 2017-12-21 RX ADMIN — Medication 100 MG: at 10:29

## 2017-12-21 RX ADMIN — POTASSIUM CHLORIDE 40 MEQ: 1.5 POWDER, FOR SOLUTION ORAL at 18:23

## 2017-12-21 RX ADMIN — LEVETIRACETAM 1500 MG: 750 TABLET, FILM COATED ORAL at 19:57

## 2017-12-21 RX ADMIN — NICOTINE POLACRILEX 2 MG: 2 GUM, CHEWING ORAL at 16:23

## 2017-12-21 RX ADMIN — PIPERACILLIN SODIUM AND TAZOBACTAM SODIUM 4.5 G: 36; 4.5 INJECTION, POWDER, LYOPHILIZED, FOR SOLUTION INTRAVENOUS at 19:03

## 2017-12-21 RX ADMIN — HEPARIN SODIUM 5000 UNITS: 5000 INJECTION, SOLUTION INTRAVENOUS; SUBCUTANEOUS at 08:09

## 2017-12-21 RX ADMIN — LEVETIRACETAM 1500 MG: 750 TABLET, FILM COATED ORAL at 09:42

## 2017-12-21 RX ADMIN — THERA TABS 1 TABLET: TAB at 09:42

## 2017-12-21 RX ADMIN — FOLIC ACID 1 MG: 1 TABLET ORAL at 09:42

## 2017-12-21 RX ADMIN — Medication 3 MG: at 19:57

## 2017-12-21 RX ADMIN — NICOTINE 1 PATCH: 21 PATCH TRANSDERMAL at 08:07

## 2017-12-21 RX ADMIN — HEPARIN SODIUM 5000 UNITS: 5000 INJECTION, SOLUTION INTRAVENOUS; SUBCUTANEOUS at 19:57

## 2017-12-21 RX ADMIN — Medication 10 MEQ: at 06:00

## 2017-12-21 RX ADMIN — PIPERACILLIN SODIUM AND TAZOBACTAM SODIUM 4.5 G: 36; 4.5 INJECTION, POWDER, LYOPHILIZED, FOR SOLUTION INTRAVENOUS at 14:38

## 2017-12-21 RX ADMIN — PIPERACILLIN SODIUM AND TAZOBACTAM SODIUM 4.5 G: 36; 4.5 INJECTION, POWDER, LYOPHILIZED, FOR SOLUTION INTRAVENOUS at 05:11

## 2017-12-21 RX ADMIN — PIPERACILLIN SODIUM AND TAZOBACTAM SODIUM 4.5 G: 36; 4.5 INJECTION, POWDER, LYOPHILIZED, FOR SOLUTION INTRAVENOUS at 23:50

## 2017-12-21 RX ADMIN — NICOTINE POLACRILEX 2 MG: 2 LOZENGE ORAL at 16:23

## 2017-12-21 RX ADMIN — Medication 10 MEQ: at 06:55

## 2017-12-21 ASSESSMENT — VISUAL ACUITY
OU: NORMAL ACUITY

## 2017-12-21 NOTE — PROGRESS NOTES
"Social Work Services Progress Note    Hospital Day: 6  Date of Initial Social Work Evaluation:  12/18/17  Collaborated with:  Patient, chart review, bedside RN, psychiatrist    Data:  Met with pt alone at the bedside. Introduced self and role of SW, as pt was not during past SW visit. Discussed role of support pt during hospitalization, as well as coordinating discharge plans. Current recommendations are for pt to d/c home with family assist. Pt confirmed that she has been living with her dad and that she plans to discharge back to his home. Inquired about whether or not she felt this was a safe plan. Pt nodded and gave a thumbs up to SW in regard to this question. Inquired about pt's mom and her relationship with her. Pt did not know where her mom lives and did not want her involved in her care.    SW acknowledged history of drug use and attempted to discuss this further, stating that SW is available to help connect pt with resources if this is something she is interested in. Pt shook her head and waved her hands no. She then stated that she \"never got addicted.\" Re-iterated that SW is available to discuss this further should she change her mind.     Discussed and provided education on having a health care directive. Pt was interested in filling out the short form naming her dad as someone she trusts to make decisions for her, should she come into the hospital and be unable to do so herself, as was the case at onset of this hospitalization. Provided pt with short form health care directive to look over and fill out if she would like.     Inquired as to whether or not pt had any other questions or concerns for SW. Pt wondered what she needed to do to get discharged. Encouraged her to speak with the medical team about her goals for discharge and also discussed plan for the day listed on pt's white board, which included going for three walks. Pt expressed understanding and denied further questions or concerns for SW. "     Psychiatrist had stopped by to see pt during SW visit. Pt was agreeable to seeing psychiatrist after SW visit.     Intervention:  Introduction of this writer and rapport building, supportive check-in, education on health care directives    Assessment:  Pt was pleasant and receptive to SW. She avoided discussion of her drug use and SW offer to connect pt with resources.     Plan:    Anticipated Disposition:  Likely home with family assist    Barriers to d/c plan:  Medical stability     Follow Up:  SW will continue to follow, support and assist with ongoing social service and discharge planning needs.     DONA Abel, [facility]  ICU Float   Pager: [phone]  [email or web address]     NO LETTER

## 2017-12-21 NOTE — DISCHARGE SUMMARY
[facility] Discharge Summary  Neurology Service    Bobby Howard  MRN# [id number]    Age: 27 year old year old YOB: 1990      Date of Admission:  12/16/2017   Date of Discharge::  12/22/17  Primary Care Provider: No Ref-Primary, Physician   Discharged to: Home           Admission Diagnoses:   Status epilepticus   Toxic encephalopathy  History of drug use          Discharge Diagnosis:   Status epilepticus, resolved  Toxic leukoencephalopathy   Strep PNA, resolved  UTI, resolved           Procedures:   - Lumbar puncture  - Video EEG         Imaging:   Brain MRI 12/17/17  Scattered patchy areas of T2 hyperintense signal in the hayley,  supratentorial periventricular and subcortical white matter of both  cerebral hemispheres, including a prominent circular focus in the left  corona radiata which demonstrates mild incomplete peripheral  enhancement. Low-grade peripheral diffusion restriction associated  with a white matter lesion in the posterior left parietal lobe. Also  mild diffusion restriction associated with an ill-defined enhancing  white matter lesion in the posterior right frontal centrum semiovale.  Additional small scattered foci of ill-defined enhancement  corresponding to areas of white matter T2 hyperintensity.     Bifrontal encephalomalacia, right greater than left, with associated  areas of cortical laminar necrosis. The ventricles are proportionate  to the cerebral sulci. Additional scattered smaller regions of white  matter T1 hypointensity in both cerebral hemispheres. No midline  shift.     No abnormal signal or atrophy in the mesial temporal lobes  bilaterally.     Foci of susceptibility effect over the left parietal and right  parietooccipital scalp.     CXR 12/18/17  Cardiac silhouette is nonenlarged. Endotracheal tube in the  mid trachea. Enteric tube collimated off film abdomen. Left basilar  atelectasis and consolidation. Right basilar atelectasis. Lung volumes  are low.           "Discharge Medications:     Current Discharge Medication List      START taking these medications    Details   azithromycin (ZITHROMAX) 250 MG tablet Two tablets first day, then one tablet daily for four days.  Qty: 3 tablet, Refills: 0    Associated Diagnoses: Streptococcal pneumonia (H)      melatonin 3 MG tablet 1 tablet (3 mg) by Oral or Feeding Tube route At Bedtime  Qty: 30 tablet, Refills: 0    Associated Diagnoses: Persistent disorder of initiating or maintaining sleep      levETIRAcetam (KEPPRA) 750 MG tablet Take 2 tablets (1,500 mg) by mouth 2 times daily  Qty: 60 tablet, Refills: 0    Associated Diagnoses: Status epilepticus, generalized convulsive (H)      folic acid (FOLVITE) 1 MG tablet Take 1 tablet (1 mg) by mouth daily  Qty: 30 tablet, Refills: 0    Associated Diagnoses: Polysubstance dependence including opioid type drug with complication, continuous use (H)      multivitamin, therapeutic (THERA-VIT) TABS tablet Take 1 tablet by mouth daily  Qty: 30 tablet, Refills: 0    Associated Diagnoses: Polysubstance dependence including opioid type drug with complication, continuous use (H)      thiamine 100 MG tablet Take 1 tablet (100 mg) by mouth daily  Qty: 30 tablet, Refills: 0    Associated Diagnoses: Polysubstance dependence including opioid type drug with complication, continuous use (H)                Consultations:   Psychiatry/Chemical dependency    Social Work     PT/OT           Brief History of Illness:   Patient is a 27-year-old female with history of polysubstance abuse, particularly opioids and methamphetamine who presents to hospital status epilepticus. Patient apparently has a long significant history of substance abuse and was on a methamphetamine \"binge\" earlier this year and recently moved in with her father over the last few months. The father believes that the patient has been sober, however he notes that her intelligence has dramatically decreased over the last 3 months. It is " unclear at this point whether this is progressive. Over the last several weeks she has had episodes of staring spells but denies being overtly sick. Father notes that she has been severely depressed over the last month. Today, she did have a generalized tonic-clonic seizure. The length of the time is unknown but she did have 3 more spells and was transferred to an outside hospital. At the outside hospital she was loaded with Keppra and Versed and intubation was attempted, but failed. She did have a fourth seizure at the outside hospital and was transferred to [facility] for further care. While en route to [facility], she had a fifth seizure which prompted paralyzation and subsequent intubation. Per chart review it appears she was given boluses of propofol at [facility] and transferred here for continuous video EEG monitoring.     See H&P for additional details.          Hospital Course (by problem list):     # Status Epilepticus  Patient presented after several spells concerning for seizure, one of which appeared clinically to be a GTC. She was first seen OSH then transferred to [facility], where she had several more events concerning for seizure, ultimately intubated and sedated with propofol then sent to [facility]. On arrival to [facility] she maintained on propofol infusion while video EEG was obtained. She was started on and maintained on Keppra. Video EEG ran for 6 days, which did intermittently show some right temporal sharps that ultimately were determined to not be epileptic. She had no discrete seizures and her EEG progressively improved. 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 It is well documented in the literature that drug use, particularly heroin but also cocaine and alcohol can result diffuse toxic leukoencephalopathy. In a young, otherwise healthy woman, there is  unlikely to be other etiology. Of note, the patient and her father report a remote history of frontal head trauma, which is possible etiology of frontal encephalomalacia but the specifics of her previous trauma are unclear. Lumbar puncture was performed, and CSF was negative for infectious etiologies including Neisseria meningitidis, H influenza, mycoplasma, cryptococcus, VZV, HSV, JCV, [place], enterovirus. HIV negative. Oligoclonal bands were elevated in the CSF, consistent with breakdown of the blood brain barrier secondary to  toxic leukocencephalopathy.     I reviewed [place] regulations on seizures and driving with the patient and they appeared to clearly understand that they are prohibited from operating a motor vehicle for 3 months following any seizure. I also recommended they avoid any activities that might lead to self-injury or injury of others for 3 months following any seizure with impaired awareness or motor control like holding young children at heights from which they might be injured if dropped, avoiding situations in which they or someone else might drown without their continuous awareness, and operating power tools, firearms, and other high-powered devices.    # Delirium secondary to polysubstance use disorder  # Cognitive disorder secondary to polysubstance abuse   # MDD  Cognitive evaluation by OT: MOCA 18/30 with largest deficits in short term recall and executive (trails making). Psychiatry and chemical dependency were consulted. See note in the chart. Patient declined antidepressant therapy as well as chemical dependency treatment. Social work was consulted as well, and again patient declined services. On discharge, chemical dependency treatment was strongly encouraged.     # Hypoxic respiratory failure secondary to encephalopathy, status epilepticus. She was extubated on hospital day 4, after a short course of corticosteroids.    # Strep pneumonia and klebsiella UTI : initially treated  "with Zosyn, narrowed to levaquin.  - Azithromycin 250 mg x 3 days        ITEMS TO FOLLOW-UP ON AFTER DISCHARGE   None         Physical Exam:     /84 (BP Location: Left arm)  Temp 98.4  F (36.9  C) (Oral)  Resp 16  Ht 1.626 m (5' 4\")  Wt 61 kg (134 lb 7.7 oz)  SpO2 95%  BMI 23.08 kg/m2   General:  NAD  Head:  Normocephalic, atraumatic  Eyes:  No conjunctival injection, no scleral icterus.   Mouth:  No oral lesions, no erythema or exudate in the oropharynx  Respiratory:  No respiratory distress  Cardiovascular:  RRR  Abdomen:  Soft  Extremities:  No peripheral edema     Neurologic: Alert, awake and oriented to person, place (\"hospital\") and time. No dysarthria, fluent speech though hypophonic. EOMI, MIKY, face symmetric, tongue midline. Normal tone, no atrophy, 5/5 strength in bilateral UEs and LEs. Sensation intact to light touch. FNF intact bilaterally. Reflexes 2+ and symmetric bilaterally. Normal gait.     DISCHARGE ORDERS      INTEGRATED PRIMARY CARE REFERRAL     Neurology Adult Referral     Reason for your hospital stay   Status Epilepticus     Adult [facility] Follow-up and recommended labs and tests   1. Follow up with your PCP within one week.  2. Follow up with neurology in 1-3 months.     Appointments on [facility] and/or [facility] (with [facility] or [facility] provider or service). Call [phone] if you haven't heard regarding these appointments within 7 days of discharge.     Activity   Your activity upon discharge: activity as tolerated, but NO DRIVING for three months.     Discharge Instructions   Per [place] State Law, you may not drive for three months following a seizure. For you, that would be March 16, 2018. See your discharge summary for more details.     Full Code     Diet   Follow this diet upon discharge: Orders Placed This Encounter     Regular Diet Adult       Patient discussed with Neurology Staff Dr. Villafana.    Patti Benjamin PGY2     Patient seen by me today December 22, 2017  I agree " with Dr. Benjamin's note from today December 22, 2017  Patient reluctant to do chem dep and will go to live with father in [place]  Encouraged to followup with neurology and needs pcp  Less than 30 minutes were spent in discharge planning  Tyrone barahona md  December 22, 2017

## 2017-12-21 NOTE — MR AVS SNAPSHOT
After Visit Summary   2017    Bobby Howard    MRN: [id number]           Patient Information     Date Of Birth          1990        Visit Information        Provider Department      2017 7:00 AM [facility] EEG TECH 4 [facility] EEG        Today's Diagnoses     Seizures (H)    -  1       Follow-ups after your visit        Who to contact     Please call your clinic at [phone] to:    Ask questions about your health    Make or cancel appointments    Discuss your medicines    Learn about your test results    Speak to your doctor   If you have compliments or concerns about an experience at your clinic, or if you wish to file a complaint, please contact [facility] Patient Relations at [phone] or email us at [email or web address]         Additional Information About Your Visit        MyChart Information     Kaptur is an electronic gateway that provides easy, online access to your medical records. With Kaptur, you can request a clinic appointment, read your test results, renew a prescription or communicate with your care team.     To sign up for [employer] visit the website at [email or web address]   You will be asked to enter the access code listed below, as well as some personal information. Please follow the directions to create your username and password.     Your access code is: [email or web address]  Expires: 3/16/2018 10:28 PM     Your access code will  in 90 days. If you need help or a new code, please contact your [facility] or call [phone] for assistance.        Care EveryWhere ID     This is your Care EveryWhere ID. This could be used by other organizations to access your [place] medical records  [id number]         Blood Pressure from Last 3 Encounters:   17 (!) 128/91    Weight from Last 3 Encounters:   17 61 kg (134 lb 7.7 oz)              Today, you had the following     No orders found for  display         Today's Medication Changes      Notice     This visit is during an admission. Changes to the med list made in this visit will be reflected in the After Visit Summary of the admission.             Primary Care Provider Fax #    Physician No Ref-Primary [phone]       No address on file        Equal Access to Services     RADHA MAL : Hadii aad ku hadevelia Sodelilah, wamukulda luqadaha, qaoneilta kaalmada adeerasmo, shana patricein hayaadax faustinzeeshan dunnavtar pichardo. So [facility] [phone].    ATENCIÓN: Si habla español, tiene a hairston disposición servicios gratuitos de asistencia lingüística. Llame al [phone].    We comply with applicable federal civil rights laws and [place] laws. We do not discriminate on the basis of race, color, national origin, age, disability, sex, sexual orientation, or gender identity.            Thank you!     Thank you for choosing [facility]  for your care. Our goal is always to provide you with excellent care. Hearing back from our patients is one way we can continue to improve our services. Please take a few minutes to complete the written survey that you may receive in the mail after your visit with us. Thank you!             Your Updated Medication List - Protect others around you: Learn how to safely use, store and throw away your medicines at www.disposemymeds.org.      Notice     This visit is during an admission. Changes to the med list made in this visit will be reflected in the After Visit Summary of the admission.

## 2017-12-21 NOTE — PLAN OF CARE
Problem: Patient Care Overview  Goal: Plan of Care/Patient Progress Review  Outcome: Improving  Pt transferred from  at 1400. Hx/o polysubstance abuse, presented to the hospital with status epilepticus. Initially here for VEEG, off leads at this time. VSS, RA. A&Ox3-4, forgetful at times. Neuro's intact, gen weakness. Soft hoarse voice. Extubated yesterday. Uses mostly non-verbal cueing. IS bedside. Nicotine patch L shoulder, PRN nicotine gum and lozenges ordered. Reg diet, no appetite, encourage intake. Voiding spont. Up SBA, GB. PIV x2 SL, order for NS@25ml/hr. IV pole ordered. K+ replaced, redraw at 4pm. Dad and step-mom bedside and supportive. Cont to monitor and follow POC.

## 2017-12-21 NOTE — PLAN OF CARE
Problem: Patient Care Overview  Goal: Plan of Care/Patient Progress Review  Discharge Planner OT   Patient plan for discharge: home  Current status: Pt requires SBA for longer mobility, Ind in room. Pt completed stairs mod I and completed ADLs with SBA and VSS on RA.  Barriers to return to prior living situation: fatigue  Recommendations for discharge: home with A from family  Rationale for recommendations: pt should be safe to complete ADLs and IADLs with A from family prn.        Entered by: Dandy Hicks 12/21/2017 10:45 AM   4A

## 2017-12-21 NOTE — PLAN OF CARE
Problem: Patient Care Overview  Goal: Plan of Care/Patient Progress Review  Discharge Planner SLP   Patient plan for discharge: Pt did not state.  Current status: Pt demonstrates ability to tolerate regular consistency solids and thin liquids. Pt demonstrates hoarse vocal quality and limited appetite. Recommend continue on current diet. SLP to follow per POC.   Barriers to return to prior living situation: None from SLP perspecitve  Recommendations for discharge: Home with family assist  Rationale for recommendations: Pt will likely meet SLP goals prior to discharge.        Entered by: Wendy Bright 12/21/2017 3:18 PM

## 2017-12-21 NOTE — PLAN OF CARE
Problem: Patient Care Overview  Goal: Plan of Care/Patient Progress Review  Q2 hour neuros intact, Pt oriented x4 except forgetful once that she was in the hospital- reoriented well. Remains on continuous EEG. Has not slept well overnight, denies any pain.   BP stable, sinus rhthm, afebrile, potassium replacement per protocol.  Requires NC oxygen when sleeping to maintain sats >92%- frequently removes her oxygen, frequent coughing thick yellow sputum, refusing IS despite education on importance, no resp distress.   Taking sips of water overnight, up to commode for loose brown BM x1.   Voiding on commode without issues.   Pt and stepmom updated on all cares throughout shift.   Please see flowsheets and charting for further detailed information.

## 2017-12-21 NOTE — PLAN OF CARE
"Problem: Patient Care Overview  Goal: Plan of Care/Patient Progress Review  Outcome: Improving           Nursing Progress Note    D/I/A: Pt A&Ox4. Requiring 3L via NC to maintain O2 sat >94%, other VSS. Extubated today at 1100, tolerated well. LS increasingly more clear throughout shift, pt able to CADB independently. Voice soft, hoarse; otherwise neurologically intact. Hyperactive at times. Denies pain or discomfort. Regular diet initiated per SLP recommendation, fair appetite. +BM x2 this shift, voiding adequately in commode. Up in room with one assist. Father at bedside throughout shift. Prior to extubation, pt expressed that she wanted her father to leave her room and not come back in. Writer and provider spoke with pt regarding this request. Immediately post-extubation, pt wanting her father and asked writer to call him. When asked about any concern, pt denied anything further. Increased visualization initiated per writer. Writer expressed concern to primary team regarding potential risk for pt psychosocially; Social work consult placed. Pt reports difficulty sleeping; Scheduled melatonin given and pt encouraged to discontinue use of cellphone and television. Will attempt to minimize interruptions overnight.   P: Continue to monitor and assess. Update POC as needed.    BP (!) 130/93  Temp 98.2  F (36.8  C) (Axillary)  Resp 16  Ht 1.626 m (5' 4\")  Wt 61 kg (134 lb 7.7 oz)  SpO2 91%  BMI 23.08 kg/m2  Adonis Yan  December 20, 2017  8:45 PM        "
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
## 2017-12-21 NOTE — PROGRESS NOTES
"[facility]  General Neurology Progress Note    Patient Name:  Bobby Howard  MRN:  [id number]    :  1990  Date of Service:  2017    Interval history: VALENTÍN, complained of vomiting.    ROS  A 10-point ROS was performed as per HPI.     Physical Examination   Vitals: BP (!) 128/91 (BP Location: Left arm)  Temp 98.5  F (36.9  C) (Oral)  Resp 16  Ht 1.626 m (5' 4\")  Wt 61 kg (134 lb 7.7 oz)  SpO2 92%  BMI 23.08 kg/m2  General: Adult, in NAD, cooperative  HEENT: NC/AT, no icterus, op pink and moist  Cardiac: RRR no M  Chest: CTAB no w/c/r  Abdomen: S/NT/ND  Extremities: No LE swelling.  Skin: No rash or lesion.   Psych: Mood pleasant, affect congruent    Neuro:  Mental status: Awake, alert, attentive, oriented. Speech nondysarthric but hypophonic.  Cranial nerves: Eyes conjugate, PERRLA, EOMI, face symmetric, facial sensation intact, shoulder shrug strong, tongue/uvula midline.   Motor: Tone within normal. 5/5 strength in all 4 extremities. No atrophy or twitches.   Reflexes: Normoreflexic and symmetric biceps, patellar, and achilles. Left toe down, right toe up.  Sensory: Intact to LT  Coordination: FNF no dysmetria  Gait: Patient declined    Investigations   Na 148  K 3.0  WBC 11.8  HGb 10.9  Pltl 282    CSF:   RBC 5  WBC 3  Glucose 61  Protein 33    Oligoclonal bands 13  IgG 683    Assessment and Plan:  Bobby Howard is a 27 year old h/o polysubstance abuse admitted 2017 with new onset status epilepticus. Unclear etiology at this point. 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 LP negative for infection but CSF has elevated bands and IgG indicative of blood brain barrier breakdown. Negative for N meningitidis, H " influenzae, mycoplasma pneumonia, cryptococcus, VZV, HSV, and enterovirus. CSF viral panel is still pending.     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    # LLL PNA, likely aspiration, strep pneumo   - Zosyn  - Follow cultures    # Klebsiella UTI  - Susceptibility pending, continue Zosyn     # Substance use  - Thiamine/folate   - Melatonin 3 mg   - Chem dep consult   - Psychiatry consult    FEN: TF  SubQ  Pantoprazole, senna  FULL    Disposition Plan   Expected discharge in 2-3 days to TCU vs home once evaluated by therapies.     Entered: Patti Benjamin 12/21/2017, 7:08 AM     Patient was seen and discussed with Dr. Villafana.    Patti Benjamin DO  Neurology PGY2  P: [phone]    Patient seen and examined by me today December 21, 2017  I agree with details from Dr. Benjamin's note from today December 21, 2017  Tyrone villafana md  December 21, 2017

## 2017-12-21 NOTE — PLAN OF CARE
Problem: Patient Care Overview  Goal: Plan of Care/Patient Progress Review  Outcome: No Change  Patient has had a good morning.  Remains horse, whispers and uses hand gestures. Slightly fidgety and sweaty. Disoriented to place, says [place]. Pleasant.   VSS. Afebrile.   Tongue with white patches and a cut on the left side. Continue to monitor.   Decreased appetite - encouraging fluids which as gotten better throughout the day.  Social work and psych stopped by.  MRI update given to patient and dad.

## 2017-12-21 NOTE — PROCEDURES
EEG#:  -6      VIDEO EEG DAY 6       DATE OF RECORDIN2017      SOURCE FILE DURATION:  7 hours and 28 minutes.      PATIENT INFORMATION:  Bobby Howard is a 27-year-old female with a history of polysubstance abuse, presented to the hospital in clinical state status epilepticus.  EEG is being done to evaluate for seizures.      MEDICATION:  Keppra 1500 mg twice a day.     TECHNICAL SUMMARY: This video EEG monitoring procedure was performed with 23 scalp electrodes in 10-20 system placements, and additional scalp, precordial and other surface electrodes used for electrical referencing and artifact detection. No electroclinical seizures or any electrographic seizures were seen. Video was reviewed intermittently by EEG technologist and physician for clinical seizures.      BACKGROUND:  The patient is more alert on this day of recording.  She was awake for the entire file and well-formed sleep architectures were recorded.  Her parietooccipital rhythm is 10-11 Hz.  She does have bursts of intermittent generalized delta-theta slowing, rarely seen, less than 5% of the record.  Her intermittent generalized delta slowing has improved compared to prior day.  She does have a lot of movement artifact on this day with a lot lead artifact due to movement.      ACTIVATION PROCEDURES:  Not done.      EPILEPTIFORM DISCHARGES:  None.      ICTAL:  None.      IMPRESSION:  This video-EEG on day 6 is abnormal due to the presence of intermittent generalized delta-theta slowing consistent with mild encephalopathy.  The patient's EEG compared to prior days has improved.  She is also encephalopathic.  No electrographic seizures or epileptiform discharges were seen.      SUMMARY OF 6 DAYS OF EEG RECORDING:  On day 1, patient had a diffuse beta pattern and severe encephalopathy secondary to propofol drip.  As the days progress her anesthetic medications were decreased and patient became more awake.  This was best seen on day 4  when patient's propofol was discontinued.  She developed a background rhythm, spontaneous eye blinking, and was more alerted.  On the following days, she was more alert, but, continued to have intermittent generalized delta-theta slowing consistent with a mild encephalopathy. On Video EEG day 3, there were rare sharp transients in the right temporal region and this was not seen again.  These were not thought to represent epileptiform discharges.  No clear epileptiform discharges or electrographic seizures were seen during 6 days of Video EEG recording.  The patient was maintained on levetiracetam 1500 mg a day.          KIM GOODRICH MD             D: 2017 09:34   T: 2017 09:59   MT: BHANU      Name:     NAEEM OLIVARES   MRN:      [id number]        Account:        [id number]   :      1990           Procedure Date: 2017      Document: [id number]

## 2017-12-22 ENCOUNTER — APPOINTMENT (OUTPATIENT)
Dept: OCCUPATIONAL THERAPY | Facility: CLINIC | Age: 27
End: 2017-12-22
Attending: PSYCHIATRY & NEUROLOGY
Payer: MEDICAID

## 2017-12-22 ENCOUNTER — APPOINTMENT (OUTPATIENT)
Dept: SPEECH THERAPY | Facility: CLINIC | Age: 27
End: 2017-12-22
Attending: PSYCHIATRY & NEUROLOGY
Payer: MEDICAID

## 2017-12-22 VITALS
HEIGHT: 64 IN | RESPIRATION RATE: 16 BRPM | DIASTOLIC BLOOD PRESSURE: 71 MMHG | OXYGEN SATURATION: 97 % | SYSTOLIC BLOOD PRESSURE: 115 MMHG | HEART RATE: 80 BPM | TEMPERATURE: 97.8 F | WEIGHT: 134.48 LBS | BODY MASS INDEX: 22.96 KG/M2

## 2017-12-22 LAB
BACTERIA SPEC CULT: ABNORMAL
BACTERIA SPEC CULT: NO GROWTH
ERYTHROCYTE [DISTWIDTH] IN BLOOD BY AUTOMATED COUNT: 12.4 % (ref 10–15)
HCT VFR BLD AUTO: 38.1 % (ref 35–47)
HGB BLD-MCNC: 12.9 G/DL (ref 11.7–15.7)
MCH RBC QN AUTO: 31.8 PG (ref 26.5–33)
MCHC RBC AUTO-ENTMCNC: 33.9 G/DL (ref 31.5–36.5)
MCV RBC AUTO: 94 FL (ref 78–100)
PLATELET # BLD AUTO: 281 10E9/L (ref 150–450)
POTASSIUM SERPL-SCNC: 3.6 MMOL/L (ref 3.4–5.3)
RBC # BLD AUTO: 4.06 10E12/L (ref 3.8–5.2)
SPECIMEN SOURCE: ABNORMAL
SPECIMEN SOURCE: NORMAL
SPECIMEN SOURCE: NORMAL
T GONDII DNA SPEC QL NAA+PROBE: NOT DETECTED
WBC # BLD AUTO: 11.1 10E9/L (ref 4–11)

## 2017-12-22 PROCEDURE — 36415 COLL VENOUS BLD VENIPUNCTURE: CPT | Performed by: PSYCHIATRY & NEUROLOGY

## 2017-12-22 PROCEDURE — 25000132 ZZH RX MED GY IP 250 OP 250 PS 637: Performed by: STUDENT IN AN ORGANIZED HEALTH CARE EDUCATION/TRAINING PROGRAM

## 2017-12-22 PROCEDURE — 85027 COMPLETE CBC AUTOMATED: CPT | Performed by: PSYCHIATRY & NEUROLOGY

## 2017-12-22 PROCEDURE — 40000225 ZZH STATISTIC SLP WARD VISIT: Performed by: SPEECH-LANGUAGE PATHOLOGIST

## 2017-12-22 PROCEDURE — 40000133 ZZH STATISTIC OT WARD VISIT: Performed by: OCCUPATIONAL THERAPIST

## 2017-12-22 PROCEDURE — 92526 ORAL FUNCTION THERAPY: CPT | Mod: GN | Performed by: SPEECH-LANGUAGE PATHOLOGIST

## 2017-12-22 PROCEDURE — 25000128 H RX IP 250 OP 636: Performed by: STUDENT IN AN ORGANIZED HEALTH CARE EDUCATION/TRAINING PROGRAM

## 2017-12-22 PROCEDURE — 84132 ASSAY OF SERUM POTASSIUM: CPT | Performed by: PSYCHIATRY & NEUROLOGY

## 2017-12-22 PROCEDURE — 97535 SELF CARE MNGMENT TRAINING: CPT | Mod: GO | Performed by: OCCUPATIONAL THERAPIST

## 2017-12-22 RX ORDER — AZITHROMYCIN 250 MG/1
TABLET, FILM COATED ORAL
Qty: 3 TABLET | Refills: 0 | Status: SHIPPED | OUTPATIENT
Start: 2017-12-22 | End: 2018-02-27

## 2017-12-22 RX ADMIN — FOLIC ACID 1 MG: 1 TABLET ORAL at 08:12

## 2017-12-22 RX ADMIN — Medication 100 MG: at 08:12

## 2017-12-22 RX ADMIN — HEPARIN SODIUM 5000 UNITS: 5000 INJECTION, SOLUTION INTRAVENOUS; SUBCUTANEOUS at 08:12

## 2017-12-22 RX ADMIN — LEVETIRACETAM 1500 MG: 750 TABLET, FILM COATED ORAL at 08:12

## 2017-12-22 RX ADMIN — PIPERACILLIN SODIUM AND TAZOBACTAM SODIUM 4.5 G: 36; 4.5 INJECTION, POWDER, LYOPHILIZED, FOR SOLUTION INTRAVENOUS at 06:22

## 2017-12-22 RX ADMIN — NICOTINE 1 PATCH: 21 PATCH TRANSDERMAL at 08:12

## 2017-12-22 RX ADMIN — THERA TABS 1 TABLET: TAB at 08:12

## 2017-12-22 ASSESSMENT — VISUAL ACUITY
OU: NORMAL ACUITY
OU: NORMAL ACUITY

## 2017-12-22 NOTE — PROCEDURES
EEG#:  -5       Video EEG day 5 on 12/20/2017.      SOURCE FILE DURATION:  23 hours and 43 minutes.      PATIENT INFORMATION:  Bobby Howard is a 27-year-old female with a history of polysubstance abuse, presented to the hospital in clinical status epilepticus.  EEG is being done to evaluate for seizures.      MEDICATIONS:  Levetiracetam 1500 mg q.12 hours and fentanyl 50 mcg per hour.     TECHNICAL SUMMARY: This video EEG monitoring procedure was performed with 23 scalp electrodes in 10-20 system placements, and additional scalp, precordial and other surface electrodes used for electrical referencing and artifact detection. No electroclinical seizures or any electrographic seizures were seen. Video was reviewed intermittently by EEG technologist and physician for clinical seizures.      BACKGROUND:  When the patient is fully awake, she has a parietooccipital rhythm of 8 Hz which is well modulated.  There is symmetric beta activity in the frontal derivations.  She does have bursts of intermittent generalized delta slowing in the awake state.  At times, there is more pronounced slowing in the left temporal region and there are bursts of generalized delta slowing.  This is persistently seen throughout the record where there is more pronounced slowing in the left temporal region.      ACTIVATION PROCEDURES:  The patient is asked to follow simple commands such as squeeze on the right, wiggle toes, squeeze on the left.  She is able to do these simple tasks.  More complex testing was not done, and photic stimulation and hyperventilation were not done.      EPILEPTIFORM DISCHARGES:  None.      ICTAL:  None.        On this day of recording, the patient does fall asleep and in sleep, there are vertex waves that are seen and symmetric sleep spindles and high voltage delta slowing.      IMPRESSION:  Video EEG day 5:  Awake and sleep video EEG day 5 is abnormal due to the presence of intermittent generalized delta  slowing with maximum slowing in the left temporal region.  These findings are consistent with a mild encephalopathy with cortical dysfunction in the left temporal region.  No electrographic seizures or epileptiform discharges were seen.  Clinical correlation is advised.         KIM GOODRICH MD             D: 2017 09:28   T: 2017 09:04   MT: SK      Name:     NAEEM OLIVARES   MRN:      [id number]        Account:        [id number]   :      1990           Procedure Date: 2017      Document: [id number]

## 2017-12-22 NOTE — PROGRESS NOTES
Social Work Services Progress Note    Hospital Day: 7  Date of Initial Social Work Evaluation:  12/18/17  Collaborated with:  None    Data:  Per rounds report, discharge is anticipated today.    Intervention:  SW attempted follow up with pt and father, however, pt had already discharged to home.    Assessment:  SW unable to meet with pt as pt has been discharged.    Plan:    Anticipated Disposition:  Pt discharged to home, today.        Follow Up:  No add'l follow up planned as pt has been discharged to home.    BRIA Robbins  Social Work, 6A  Phone:  [phone]  Pager:  [phone]  12/22/2017

## 2017-12-22 NOTE — PLAN OF CARE
Problem: Patient Care Overview  Goal: Plan of Care/Patient Progress Review  Patient admitted d/t possible status epilepticus. Hx of polysubstance abuse. VSS. A&Ox4. Neuros intact other than hoarse, soft voice and generalized weakness. VEEG discontinued earlier today. Regular diet, fair intake. PIV x2 SL. Up independently. Voiding spontaneously. No BM this shift. Denies pain. Potassium replaced this shift, recheck at 2300. Will continue to monitor.

## 2017-12-22 NOTE — PLAN OF CARE
Problem: Patient Care Overview  Goal: Plan of Care/Patient Progress Review  Speech Language Therapy Discharge Summary    Reason for therapy discharge:    All goals and outcomes met, no further needs identified.    Progress towards therapy goal(s). See goals on Care Plan [facility] electronic health record for goal details.  Goals met    Therapy recommendation(s):    No further therapy is recommended.     Discharge diet: Regular textures and thin liquids

## 2017-12-22 NOTE — PLAN OF CARE
"Problem: Patient Care Overview  Goal: Plan of Care/Patient Progress Review  Discharge Planner OT 6A  Patient plan for discharge: home asap  Current status: Pt currently IND with ADLs and functional mobility.      Luke Cognitive Assessment (MOCA). Pt scored 18/30 indicating  impairment (<26 indicates impairment). Cognitive domains assessed: 1) visuospatial/executive function (abbreviated trail mapping task alternating numbers & letters in sequential order; copy a cube; draw a clock with hour markers and time at ten past eleven); 2) naming of 3 illustrated animals; 3) memory/ recall of 5 unrelated words (immediate, and delayed after greater than 5 minutes with distraction); 4) language: a) repeating 2 statements of 11 & 13 words; and b) fluency with 11 words in 1 minute that begin with the letter \"s\" (N=11+); 5) abstraction (identifying similarily between 2 words); 6) orientation (date, month, year, day, place and city).  Pt was Ox2 (person and time, unable to recall hospital name), able to recall 1/5 items after 5', able to recall additional 4 with cues.     Memory Index Score (MIS) is a subset score of the MoCa. This scale provides additional information about the type of memory deficit: Deficits due to retrieval failures, performance can be improved with a cue. Deficits due to encoding failures, performance does not improve with a cue.    MIS 9/15  Pt demonstrated improvement with a cue indicating retrieval failures.     Barriers to return to prior living situation: cognitive deficits, safety 2/2 judgement  Recommendations for discharge: home w A from family, assist with complex IADLs and OP OT for compensation    Rationale for recommendations: pt scoring poorly on cognitive screening and judgement tasks per psych. Pt would benefit from assist from family and compensatory techniques.         Occupational Therapy Discharge Summary    Reason for therapy discharge:    All goals and outcomes met, no further needs " identified.    Progress towards therapy goal(s). See goals on Care Plan in [facility] electronic health record for goal details.  Goals met  Education for cognitive compensation and assist provided to pt and dad.    Therapy recommendation(s):    No further therapy is recommended.  Rec home w A form family for complex IADLS due to cognitive deficits. Pt would benefit from OP OT to improve cognitive compensation for IADLs.               Entered by: Tracy Stephens 12/22/2017 12:08 PM

## 2017-12-23 NOTE — CONSULTS
PSYCHIATRIC CONSULTATION       DATE OF CONSULTATION:  12/22/2017.       IDENTIFICATION:  Ms. Bobby Howard is a 27-year-old white female who was initially admitted to our Intensive Care Unit with status epilepticus.  She has had very significant substance abuse and unfortunately developed a number of foci of subacute and chronic leukoencephalopathy, also bifrontal encephalomalacia and I am asked to provide a psychiatric followup and talk with the patient's father by Dr. Benjamin.        HISTORY OF PRESENT ILLNESS:  I had an opportunity to provide an initial psychiatric consultation for Ms. Howard on 12/21/2017.  At that time, the MRI scan was noted and I suggested neuropsych testing in the future.  Today occupational therapy did complete a MoCA.  This certainly is not a formal neuropsych testing but this gave a clear prediction of her abilities and unfortunately she only scored 18/30 on the MoCA and 9/15 on the memory index score.  She had clear cognitive deficit and issues with safety secondary to judgment.  The recommendation was still that she should go home but that she should get assistance from family.      Her father was quite concerned about the lack of discharge planning, but it turns out that the patient has already received a Rule 25.  The father does not like the results of that Rule 25 because he feels it was done before the cognitive issues were well known.  He was wondering what would be an appropriate referral.  I did discuss this with chemical dependency intake who suggested [facility] was quite used to taking care of patients with memory deficits and also the suggestion that [facility] might be helpful.  Her father seemed quite relieved to know that there were potential facilities to help take care of his daughter.      MENTAL STATUS EXAMINATION:  Today, the patient continued to have poverty of speech, but she was able to get her clothes on and get ready to leave without difficulty.  Her mood  was somewhat dysphoric and her affect was restricted.  Her speech was soft, but coherent and goal oriented.  Her associations were tight.  Her thought processes logical and linear.  Content of thought was without overt psychosis or suicidal ideation.  Recent memory is clearly impaired.  Distant memory is likely better preserved, but still seems somewhat impaired as does fund of knowledge and concentration.  Use of language was within normal limits.  She is alert and oriented x3.  Insight and judgment are guarded.  Muscle strength and tone are at baseline.  Recent vital signs include a temperature of 97.8, pulse 80, respiration rate of 16, blood pressure 115/71 and 97% oxygen saturation.      ASSESSMENT:   1.  Polysubstance use disorder.   2.  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  The brain injury is thought secondary to substance use.        RECOMMENDATION:  For chemical dependency treatment and ongoing therapy, perhaps an MICD program, though I do think [facility] may be a very reasonable option.         WENDY BECK MD             D: 2017 13:04   T: 2017 19:56   MT: ELEONORA      Name:     NAEEM OLIVARES   MRN:      [id number]        Account:       [id number]   :      1990           Consult Date:  2017      Document: [id number]

## 2017-12-24 LAB
BACTERIA SPEC CULT: NO GROWTH
BACTERIA SPEC CULT: NO GROWTH
SPECIMEN SOURCE: NORMAL
SPECIMEN SOURCE: NORMAL

## 2017-12-25 LAB — T SOL LAR IGG CSF-ACNC: 0.01 OD

## 2017-12-31 LAB
BACTERIA SPEC CULT: NORMAL
Lab: NORMAL
SPECIMEN SOURCE: NORMAL

## 2018-01-04 ENCOUNTER — OFFICE VISIT (OUTPATIENT)
Dept: INTERNAL MEDICINE | Facility: CLINIC | Age: 28
End: 2018-01-04
Payer: MEDICAID

## 2018-01-04 VITALS
TEMPERATURE: 98.3 F | OXYGEN SATURATION: 100 % | BODY MASS INDEX: 22.14 KG/M2 | DIASTOLIC BLOOD PRESSURE: 68 MMHG | SYSTOLIC BLOOD PRESSURE: 104 MMHG | WEIGHT: 129 LBS | RESPIRATION RATE: 20 BRPM | HEART RATE: 94 BPM

## 2018-01-04 DIAGNOSIS — F19.20: ICD-10-CM

## 2018-01-04 DIAGNOSIS — Z87.440 PERSONAL HISTORY OF URINARY TRACT INFECTION: Primary | ICD-10-CM

## 2018-01-04 DIAGNOSIS — G40.901 STATUS EPILEPTICUS, GENERALIZED CONVULSIVE (H): ICD-10-CM

## 2018-01-04 DIAGNOSIS — G47.00 PERSISTENT DISORDER OF INITIATING OR MAINTAINING SLEEP: ICD-10-CM

## 2018-01-04 LAB
ALBUMIN UR-MCNC: NEGATIVE MG/DL
APPEARANCE UR: ABNORMAL
BILIRUB UR QL STRIP: NEGATIVE
COLOR UR AUTO: YELLOW
GLUCOSE UR STRIP-MCNC: NEGATIVE MG/DL
HGB UR QL STRIP: NEGATIVE
KETONES UR STRIP-MCNC: NEGATIVE MG/DL
LEUKOCYTE ESTERASE UR QL STRIP: NEGATIVE
MUCOUS THREADS #/AREA URNS LPF: PRESENT /LPF
NITRATE UR QL: NEGATIVE
PH UR STRIP: 6 PH (ref 5–7)
RBC #/AREA URNS AUTO: 1 /HPF (ref 0–2)
SOURCE: ABNORMAL
SP GR UR STRIP: 1.01 (ref 1–1.03)
SQUAMOUS #/AREA URNS AUTO: <1 /HPF (ref 0–1)
UROBILINOGEN UR STRIP-MCNC: 0 MG/DL (ref 0–2)
WBC #/AREA URNS AUTO: 4 /HPF (ref 0–2)

## 2018-01-04 RX ORDER — MULTIVITAMIN,THERAPEUTIC
1 TABLET ORAL DAILY
Qty: 60 TABLET | Refills: 3 | Status: SHIPPED | OUTPATIENT
Start: 2018-01-04

## 2018-01-04 RX ORDER — FOLIC ACID 1 MG/1
1 TABLET ORAL DAILY
Qty: 60 TABLET | Refills: 3 | Status: SHIPPED | OUTPATIENT
Start: 2018-01-04 | End: 2019-01-09

## 2018-01-04 RX ORDER — LANOLIN ALCOHOL/MO/W.PET/CERES
100 CREAM (GRAM) TOPICAL DAILY
Qty: 60 TABLET | Refills: 3 | Status: SHIPPED | OUTPATIENT
Start: 2018-01-04 | End: 2019-04-20

## 2018-01-04 RX ORDER — LANOLIN ALCOHOL/MO/W.PET/CERES
3 CREAM (GRAM) TOPICAL AT BEDTIME
Qty: 60 TABLET | Refills: 3 | Status: SHIPPED | OUTPATIENT
Start: 2018-01-04 | End: 2018-02-27

## 2018-01-04 RX ORDER — LEVETIRACETAM 750 MG/1
1500 TABLET ORAL 2 TIMES DAILY
Qty: 120 TABLET | Refills: 3 | Status: SHIPPED | OUTPATIENT
Start: 2018-01-04 | End: 2018-09-18

## 2018-01-04 ASSESSMENT — ENCOUNTER SYMPTOMS
WEIGHT LOSS: 0
LEG PAIN: 0
PALPITATIONS: 0
SPUTUM PRODUCTION: 0
VOMITING: 0
COUGH DISTURBING SLEEP: 0
CONSTIPATION: 0
DOUBLE VISION: 0
MEMORY LOSS: 0
BLOATING: 0
MUSCLE WEAKNESS: 0
TREMORS: 0
EYE WATERING: 0
BREAST PAIN: 0
MUSCLE CRAMPS: 0
EYE REDNESS: 0
ARTHRALGIAS: 0
TASTE DISTURBANCE: 0
BRUISES/BLEEDS EASILY: 0
ALTERED TEMPERATURE REGULATION: 0
RESPIRATORY PAIN: 0
LIGHT-HEADEDNESS: 0
NUMBNESS: 0
TACHYCARDIA: 0
STIFFNESS: 0
POSTURAL DYSPNEA: 0
TINGLING: 0
WHEEZING: 0
PANIC: 0
MYALGIAS: 0
BACK PAIN: 0
SORE THROAT: 0
DYSURIA: 0
PARALYSIS: 0
EYE IRRITATION: 0
ORTHOPNEA: 0
HOT FLASHES: 0
RECTAL BLEEDING: 0
DYSPNEA ON EXERTION: 0
DIFFICULTY URINATING: 0
ABDOMINAL PAIN: 0
COUGH: 0
WEIGHT GAIN: 0
SKIN CHANGES: 0
NAIL CHANGES: 0
POOR WOUND HEALING: 0
CLAUDICATION: 0
RECTAL PAIN: 0
NECK MASS: 0
DIZZINESS: 0
DISTURBANCES IN COORDINATION: 0
DECREASED LIBIDO: 0
DIARRHEA: 0
SNORES LOUDLY: 0
FLANK PAIN: 0
NAUSEA: 0
NIGHT SWEATS: 0
SPEECH CHANGE: 0
NECK PAIN: 0
WEAKNESS: 0
LEG SWELLING: 0
EXTREMITY NUMBNESS: 0
NERVOUS/ANXIOUS: 0
POLYDIPSIA: 0
SMELL DISTURBANCE: 0
SWOLLEN GLANDS: 0
HEMATURIA: 0
BREAST MASS: 0
HYPOTENSION: 0
JAUNDICE: 0
SINUS CONGESTION: 0
SLEEP DISTURBANCES DUE TO BREATHING: 0
LOSS OF CONSCIOUSNESS: 0
SHORTNESS OF BREATH: 0
EYE PAIN: 0
HALLUCINATIONS: 0
INCREASED ENERGY: 0
HOARSE VOICE: 0
BLOOD IN STOOL: 0
HEARTBURN: 0
JOINT SWELLING: 0
DECREASED APPETITE: 0
FATIGUE: 0
FEVER: 0
EXERCISE INTOLERANCE: 0
SINUS PAIN: 0
DECREASED CONCENTRATION: 0
CHILLS: 0
POLYPHAGIA: 0
HEADACHES: 0
HYPERTENSION: 0
DEPRESSION: 0
SEIZURES: 0
TROUBLE SWALLOWING: 0
BOWEL INCONTINENCE: 0
SYNCOPE: 0
INSOMNIA: 0
HEMOPTYSIS: 0

## 2018-01-04 ASSESSMENT — PAIN SCALES - GENERAL: PAINLEVEL: NO PAIN (0)

## 2018-01-04 NOTE — NURSING NOTE
Chief Complaint   Patient presents with     Establish Care     establish care/provider     Hospital F/U     Hospital follow up (12/16/17-12/22/17)for seizure   Pennie Hunt LPN 12:50 PM on 1/4/2018      Rooming Note  Health Maintenance   Health Maintenance Due   Topic Date Due     HPV IMMUNIZATION (1 of 3 - Female 3 Dose Series) 09/03/2003     TETANUS IMMUNIZATION (SYSTEM ASSIGNED)  09/03/2010     PAP SCREENING Q3 YR (SYSTEM ASSIGNED)  09/03/2013    All health maintenance items discussed and pended.    Pennie Hunt LPN 12:55 PM on 1/4/2018

## 2018-01-04 NOTE — LETTER
Patient:  Bobby Howard  :   1990  MRN:     [id number]        Ms. Bobby Howard  [address] [facility] [address]        2018    Dear Ms. Howard,    Thank you for choosing the [facility] for your healthcare needs.  We appreciate the opportunity to serve you.    The following are your recent test results.     Hi Ms. Howard,     I have your urinalysis results which were normal- your UTI is cleared.     I look forward to seeing you again next month.       Results for orders placed or performed in visit on 18   UA with Microscopic reflex to Culture   Result Value Ref Range    Color Urine Yellow     Appearance Urine Slightly Cloudy     Glucose Urine Negative NEG^Negative mg/dL    Bilirubin Urine Negative NEG^Negative    Ketones Urine Negative NEG^Negative mg/dL    Specific Gravity Urine 1.008 1.003 - 1.035    Blood Urine Negative NEG^Negative    pH Urine 6.0 5.0 - 7.0 pH    Protein Albumin Urine Negative NEG^Negative mg/dL    Urobilinogen mg/dL 0.0 0.0 - 2.0 mg/dL    Nitrite Urine Negative NEG^Negative    Leukocyte Esterase Urine Negative NEG^Negative    Source Midstream Urine     WBC Urine 4 (H) 0 - 2 /HPF    RBC Urine 1 0 - 2 /HPF    Squamous Epithelial /HPF Urine <1 0 - 1 /HPF    Mucous Urine Present (A) NEG^Negative /LPF       Please contact your provider if you have any questions or concerns.  We look forward to serving your needs in the future.    Take care,   Dr. Das/PRASHANTH

## 2018-01-04 NOTE — PATIENT INSTRUCTIONS
[facility]: [phone]     [facility] Medication Refill Request Information:  * Please contact your pharmacy regarding ANY request for medication refills.  ** [facility] Prescription Fax = [phone]  * Please allow 3 business days for routine medication refills.  * Please allow 5 business days for controlled substance medication refills.     [facility] Test Result notification information:  *You will be notified with in 7-10 days of your appointment day regarding the results of your test.  If you are on MyChart you will be notified as soon as the provider has reviewed the results and signed off on them.    Please schedule the following appointments:  Health Psychology (Dr. Yeskia Albert) [phone]  Health Psychology (Dr. Rubio Graves) [phone]   Health Psychology (Dr. Pilar Espino) [phone]   Health Psychology (Dr. Medina Mosley) [phone]  Health Psychology (Dr. Sixto Jones) [phone]

## 2018-01-04 NOTE — MR AVS SNAPSHOT
After Visit Summary   1/4/2018    Bobby Howard    MRN: [id number]           Patient Information     Date Of Birth          1990        Visit Information        Provider Department      1/4/2018 1:05 PM Claudio Das MD [facility]        Today's Diagnoses     Personal history of urinary tract infection    -  1    Status epilepticus, generalized convulsive (H)        Polysubstance dependence including opioid type drug with complication, continuous use (H)        Persistent disorder of initiating or maintaining sleep          Care Instructions    [facility]: [phone]     [facility] Medication Refill Request Information:  * Please contact your pharmacy regarding ANY request for medication refills.  ** [facility] Prescription Fax = [phone]  * Please allow 3 business days for routine medication refills.  * Please allow 5 business days for controlled substance medication refills.     [facility] Test Result notification information:  *You will be notified with in 7-10 days of your appointment day regarding the results of your test.  If you are on MyChart you will be notified as soon as the provider has reviewed the results and signed off on them.    Please schedule the following appointments:  Health Psychology (Dr. Yesika Albert) [phone]  Health Psychology (Dr. Rubio Graves) [phone]   Health Psychology (Dr. Pilar Espino) [phone]   Health Psychology (Dr. Medina Mosley) [phone]  Health Psychology (Dr. Sixto Jones) [phone]             Follow-ups after your visit        Additional Services     PSYCHOLOGY ([facility] HEALTH [facility]YCHOLOGY) REFERRAL       Your provider has referred you to:  PREFERRED PROVIDERS:    Please be aware that coverage of these services is subject to the terms and limitations of your health insurance plan.  Call member services at your health plan with any benefit or coverage questions.      Please bring the  following to your appointment:    >>   Any x-rays, CTs or MRIs which have been performed.  Contact the facility where they were done to arrange for  prior to your scheduled appointment.   >>   List of current medications   >>   This referral request   >>   Any documents/labs given to you for this referral                  Your next 10 appointments already scheduled     Jan 08, 2018 11:00 AM CST   Nurse Visit with  Pcc Nurse   [facility] ([facility])    [address] [place] [address]  4th Floor  [facility] [id number] [phone]            Jan 23, 2018 10:00 AM CST   (Arrive by 9:45 AM)   New Seizure with Roosevelt Díaz MD   [facility] ([facility])    [address] [place] [address]  3rd Floor  [facility] [id number] [phone]            Feb 13, 2018  1:05 PM CST   (Arrive by 12:50 PM)   Return Visit with Claudio Das MD   [facility] ([facility])    [address] [place] [address]  4th [facility] [id number] [phone]              Future tests that were ordered for you today     Open Future Orders        Priority Expected Expires Ordered    UA with Micro reflex to Culture Routine 1/4/2018 1/4/2019 1/4/2018            Who to contact     Please call your clinic at [phone] to:    Ask questions about your health    Make or cancel appointments    Discuss your medicines    Learn about your test results    Speak to your doctor   If you have compliments or concerns about an experience at your clinic, or if you wish to file a complaint, please contact [facility] Patient Relations at [phone] or email us at [email or web address]         Additional Information About Your Visit        [email or web address] Information     DirectPointe is an electronic gateway that provides easy, online access to your medical records. With [employer], you can request a clinic  appointment, read your test results, renew a prescription or communicate with your care team.     To sign up for [employer] visit the website at [email or web address]   You will be asked to enter the access code listed below, as well as some personal information. Please follow the directions to create your username and password.     Your access code is: [email or web address]  Expires: 3/16/2018 10:28 PM     Your access code will  in 90 days. If you need help or a new code, please contact your [facility] or call [phone] for assistance.        Care EveryWhere ID     This is your Care EveryWhere ID. This could be used by other organizations to access your [facility] medical records  [id number]        Your Vitals Were     Pulse Temperature Respirations Pulse Oximetry BMI (Body Mass Index)       94 98.3  F (36.8  C) (Oral) 20 100% 22.14 kg/m2        Blood Pressure from Last 3 Encounters:   18 104/68   17 115/71    Weight from Last 3 Encounters:   18 58.5 kg (129 lb)   17 61 kg (134 lb 7.7 oz)              We Performed the Following     PSYCHOLOGY ([facility] HEALTH PSYCHOLOGY) REFERRAL          Where to get your medicines      These medications were sent to [facility] [address] [place] [address] 5TH [facility] AT [facility] 3 &   [address] [place] [id number]     Phone:  [phone]     folic acid 1 MG tablet    levETIRAcetam 750 MG tablet    melatonin 3 MG tablet    multivitamin, therapeutic Tabs tablet    thiamine 100 MG tablet          Primary Care Provider Fax #    Physician No Ref-Primary [phone]       No address on file        Equal Access to Services     RADHA RESENDEZ : Hadii aad ku hadasho Sogabbyali, waaxda luqadaha, qaybta kaalmada adeegyada, shana pichardo. So [facility] [phone].    ATENCIÓN: Si habla español, tiene a hairston disposición servicios gratuitos de asistencia lingüística. Llame al [phone].    We  comply with applicable federal civil rights laws and [place] laws. We do not discriminate on the basis of race, color, national origin, age, disability, sex, sexual orientation, or gender identity.            Thank you!     Thank you for choosing [facility]  for your care. Our goal is always to provide you with excellent care. Hearing back from our patients is one way we can continue to improve our services. Please take a few minutes to complete the written survey that you may receive in the mail after your visit with us. Thank you!             Your Updated Medication List - Protect others around you: Learn how to safely use, store and throw away your medicines at www.disposemymeds.org.          This list is accurate as of: 1/4/18  2:18 PM.  Always use your most recent med list.                   Brand Name Dispense Instructions for use Diagnosis    azithromycin 250 MG tablet    ZITHROMAX    3 tablet    Two tablets first day, then one tablet daily for four days.    Streptococcal pneumonia (H)       folic acid 1 MG tablet    FOLVITE    60 tablet    Take 1 tablet (1 mg) by mouth daily    Polysubstance dependence including opioid type drug with complication, continuous use (H)       levETIRAcetam 750 MG tablet    KEPPRA    120 tablet    Take 2 tablets (1,500 mg) by mouth 2 times daily    Status epilepticus, generalized convulsive (H)       melatonin 3 MG tablet     60 tablet    1 tablet (3 mg) by Oral or Feeding Tube route At Bedtime    Persistent disorder of initiating or maintaining sleep       multivitamin, therapeutic Tabs tablet     60 tablet    Take 1 tablet by mouth daily    Polysubstance dependence including opioid type drug with complication, continuous use (H)       thiamine 100 MG tablet     60 tablet    Take 1 tablet (100 mg) by mouth daily    Polysubstance dependence including opioid type drug with complication, continuous use (H)

## 2018-01-04 NOTE — PROGRESS NOTES
PRIMARY CARE CENTER         HPI:       Bobby Howard is a 27 year old female who presents for the following  Patient presents with: Establish Care (establish care/provider) and Hospital F/U (Hospital follow up (12/16/17-12/22/17)for seizure)    Bobby was recently hospitalized for status epilepticus in the setting of long-standing polysubstance abuse.  She was found to have multiple abnormalities on MRI, some of which are chronic and some of which are subacute, along with generalized edema.  She had 6 seizures the day that she was admitted and she was intubated and sedated en route to the hospital.  In the hospital she had a prolonged EEG monitoring which did not show any seizure activity.  She was loaded with Keppra and successfully extubated after a short course of steroids.  She was also treated for urinary tract infection during that time, which she completed the course at home. She was discharged on Keppra. See the discharge summary from 12- for further information.  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  Lumbar puncture was negative for infectious etiologies however oligoclonal bands were elevated which is consistent with blood-brain barrier breakdown secondary to toxic leukoencephalopathy.  It is also noted that she had a MOCA of 18/30 at the end of her hospitalization, and that she has the most trouble with short-term memory and task completion.    When asked about her history with substance abuse she becomes tearful and says that she was forced to do drugs of various kinds, most notably meth, and that at one point she was threatened to be killed if she did not comply.  Her stepmom added additional information that she had gone missing for about 6 months in [place], at which point she was found and driven to a [facility] house.  They got in touch with her father in [place] who picked  her up around Thanksgiving time, and she has been living with him in his wife since then.  There has not been any concern of substance use since she has been home.  She says that she is not the type of person to use drugs, but she was forced to and she had no choice.     She says multiple times during this visit that her dad and his wife saved her.    Her father says that since she has been home she may have had some staring spells, and he feels like maybe she is not as sharp as she had been before.     She has kids who are in foster care who she misses a lot and is very motivated to get them back.       Problem, Medication and Allergy Lists were reviewed and are current.  Patient is a new patient to this clinic and so  I reviewed/updated the Past Medical History, the Family History and the Social History.          Review of Systems:   Review of Systems     Constitutional:  Negative for fever, chills, weight loss, weight gain, fatigue, decreased appetite, night sweats, recent stressors, height gain, height loss, post-operative complications, incisional pain, hallucinations, increased energy, hyperactivity and confused.   HENT:  Negative for ear pain, hearing loss, tinnitus, nosebleeds, trouble swallowing, hoarse voice, mouth sores, sore throat, ear discharge, tooth pain, gum tenderness, taste disturbance, smell disturbance, hearing aid, bleeding gums, dry mouth, sinus pain, sinus congestion and neck mass.    Eyes:  Negative for double vision, pain, redness, eye pain, decreased vision, eye watering, eye bulging, eye dryness, flashing lights, spots, floaters, strabismus, tunnel vision, jaundice and eye irritation.   Respiratory:   Negative for cough, hemoptysis, sputum production, shortness of breath, wheezing, sleep disturbances due to breathing, snores loudly, respiratory pain, dyspnea on exertion, cough disturbing sleep and postural dyspnea.    Cardiovascular:  Negative for chest pain, dyspnea on exertion,  palpitations, orthopnea, claudication, leg swelling, fingers/toes turn blue, hypertension, hypotension, syncope, history of heart murmur, chest pain on exertion, chest pain at rest, pacemaker, few scattered varicosities, leg pain, sleep disturbances due to breathing, tachycardia, light-headedness, exercise intolerance and edema.   Gastrointestinal:  Negative for heartburn, nausea, vomiting, abdominal pain, diarrhea, constipation, blood in stool, melena, rectal pain, bloating, hemorrhoids, bowel incontinence, jaundice, rectal bleeding, coffee ground emesis and change in stool.   Genitourinary:  Negative for bladder incontinence, dysuria, urgency, hematuria, flank pain, vaginal discharge, difficulty urinating, genital sores, dyspareunia, decreased libido, nocturia, voiding less frequently, arousal difficulty, abnormal vaginal bleeding, excessive menstruation, menstrual changes, hot flashes, vaginal dryness and postmenopausal bleeding.   Musculoskeletal:  Negative for myalgias, back pain, joint swelling, arthralgias, stiffness, muscle cramps, neck pain, bone pain, muscle weakness and fracture.   Skin:  Negative for nail changes, itching, poor wound healing, rash, hair changes, skin changes, acne, warts, poor wound healing, scarring, flaky skin, Raynaud's phenomenon, sensitivity to sunlight and skin thickening.   Neurological:  Negative for dizziness, tingling, tremors, speech change, seizures, loss of consciousness, weakness, light-headedness, numbness, headaches, disturbances in coordination, extremity numbness, memory loss, difficulty walking and paralysis.   Endo/Heme:  Negative for anemia, swollen glands and bruises/bleeds easily.   Psychiatric/Behavioral:  Negative for depression, hallucinations, memory loss, decreased concentration, mood swings and panic attacks.    Breast:  Negative for breast discharge, breast mass, breast pain and nipple retraction.   Endocrine:  Negative for altered temperature regulation,  polyphagia, polydipsia, unwanted hair growth and change in facial hair.    I have personally reviewed and updated the complete ROS on the day of the visit.           Physical Exam:   /68 (BP Location: Right arm, Patient Position: Sitting, Cuff Size: Adult Regular)  Pulse 94  Temp 98.3  F (36.8  C) (Oral)  Resp 20  Wt 58.5 kg (129 lb)  SpO2 100%  BMI 22.14 kg/m2  Body mass index is 22.14 kg/(m^2).  Vitals were reviewed       GENERAL APPEARANCE: alert, tearful     EYES: EOMI, PERRL     NECK: no adenopathy, no asymmetry, masses, or scars and thyroid normal to palpation     RESP: lungs clear to auscultation - no rales, rhonchi or wheezes. Cough present and non-productive     CV: regular rates and rhythm, normal S1 S2, no S3 or S4 and no murmur, click or rub     ABDOMEN:  soft, nontender, no HSM or masses and bowel sounds normal     MS: extremities normal- no gross deformities noted, no evidence of inflammation in joints, FROM in all extremities.     SKIN: no suspicious lesions or rashes     NEURO: Normal strength and tone, sensory exam grossly normal, speech normal     PSYCH: very tearful most of visit, otherwise anxious to get outside to smoke, apologetic, but kind and polite        Results:     none  Assessment and Plan     Bobby was seen today for [facility] and hospital f/u.    Diagnoses and all orders for this visit:    Personal history of urinary tract infection-she is no longer feeling symptoms of UTI.  She completed the course of azithromycin as instructed.  -     UA with Micro reflex to Culture; Future    Status epilepticus, generalized convulsive (H)-she has appointment appointment with neurology on the 23rd of this month in the meantime I am refilling her Keppra.  No seizure activity since discharge.  Patient is very afraid of having another seizure.  I discussed at length with the family the course of action of if she were to have a seizure-namely noting that time, making patient safe in her  environment, and placing her on her side to protect the airway.  Family was wondering about follow-up MRIs and if they will be indicated-I told him that this will be neurology's prerogative, but it may not be necessary without new or changes in symptoms.  -     levETIRAcetam (KEPPRA) 750 MG tablet; Take 2 tablets (1,500 mg) by mouth 2 times daily    Polysubstance dependence including opioid type drug with complication-no evidence of continued use.  At the time of hospital discharge patient was not interested in seeing a psychiatrist or substance abuse specialist.  I discussed this with her and she thinks she is ready to talk to her therapist.  She is not interested right now in psychiatry because they prescribe medications.  She is mandated by the courts to complete a rehab program for substance abuse, for which I am filling out the paperwork today.    -     folic acid (FOLVITE) 1 MG tablet; Take 1 tablet (1 mg) by mouth daily  -     multivitamin, therapeutic (THERA-VIT) TABS tablet; Take 1 tablet by mouth daily  -     thiamine 100 MG tablet; Take 1 tablet (100 mg) by mouth daily  -     PSYCHOLOGY ([facility] PSYCHOLOGY) REFERRAL  -  Patient needs PPD placed for inpt rehab program.     Persistent disorder of initiating or maintaining sleep-we discussed how melatonin works, and she says that it has been helping her.  I also discussed techniques on improving sleep hygiene-such as using the bed/bedroom only for sleep.   -     melatonin 3 MG tablet; 1 tablet (3 mg) by Oral or Feeding Tube route At Bedtime        Options for treatment and follow-up care were reviewed with the patient. Bobby Howard engaged in the decision making process and verbalized understanding of the options discussed and agreed with the final plan.    Claudio Das MD  Jan 4, 2018    Pt was was recently discharged from hospital and plan of care discussed with Dr. Nichols.     Pt was seen and examined with Dr. Das.  I agree with her  documentation as noted above.    My additional comments: None    Bahman Nichols MD

## 2018-01-08 ENCOUNTER — ALLIED HEALTH/NURSE VISIT (OUTPATIENT)
Dept: INTERNAL MEDICINE | Facility: CLINIC | Age: 28
End: 2018-01-08
Payer: MEDICAID

## 2018-01-08 DIAGNOSIS — Z11.1 SCREENING EXAMINATION FOR PULMONARY TUBERCULOSIS: Primary | ICD-10-CM

## 2018-01-08 NOTE — MR AVS SNAPSHOT
After Visit Summary   1/8/2018    Bobby Howard    MRN: [id number]           Patient Information     Date Of Birth          1990        Visit Information        Provider Department      1/8/2018 11:00 AM Nurse, [facility]        Today's Diagnoses     Screening examination for pulmonary tuberculosis    -  1       Follow-ups after your visit        Your next 10 appointments already scheduled     Jan 23, 2018 10:00 AM CST   (Arrive by 9:45 AM)   New Seizure with Roosevelt Díaz MD   [facility] Neurology ([facility])    [address] [place] [id number] [phone]            Feb 13, 2018  1:05 PM CST   (Arrive by 12:50 PM)   Return Visit with Claudio Das MD   [facility] ([facility])    [address] [place] [address] [id number] [phone]              Who to contact     Please call your clinic at [phone] to:    Ask questions about your health    Make or cancel appointments    Discuss your medicines    Learn about your test results    Speak to your doctor   If you have compliments or concerns about an experience at your clinic, or if you wish to file a complaint, please contact [facility] Patient Relations at [phone] or email us at [email or web address]         Additional Information About Your Visit        MyChart Information     Carousell is an electronic gateway that provides easy, online access to your medical records. With Carousell, you can request a clinic appointment, read your test results, renew a prescription or communicate with your care team.     To sign up for "[employer]t visit the website at [email or web address]   You will be asked to enter the access code listed below, as well as some personal information. Please follow the directions to create your username and password.     Your access  code is: [email or web address]  Expires: 3/16/2018 10:28 PM     Your access code will  in 90 days. If you need help or a new code, please contact your [facility] or call [phone] for assistance.        Care EveryWhere ID     This is your Care EveryWhere ID. This could be used by other organizations to access your [place] medical records  [id number]         Blood Pressure from Last 3 Encounters:   18 104/68   17 115/71    Weight from Last 3 Encounters:   18 58.5 kg (129 lb)   17 61 kg (134 lb 7.7 oz)              We Performed the Following     TB INTRADERMAL TEST        Primary Care Provider Fax #    Physician No Ref-Primary [phone]       No address on file        Equal Access to Services     ANT RESENDEZ : Hadii dick rooney Sodelilah, waaxda luqadaha, qaybta kaalmada adezeeshanyagiovanni, shana ruiz . So [facility] [phone].    ATENCIÓN: Si habla español, tiene a hairston disposición servicios gratuitos de asistencia lingüística. Llame al [phone].    We comply with applicable federal civil rights laws and [place] laws. We do not discriminate on the basis of race, color, national origin, age, disability, sex, sexual orientation, or gender identity.            Thank you!     Thank you for choosing [facility]  for your care. Our goal is always to provide you with excellent care. Hearing back from our patients is one way we can continue to improve our services. Please take a few minutes to complete the written survey that you may receive in the mail after your visit with us. Thank you!             Your Updated Medication List - Protect others around you: Learn how to safely use, store and throw away your medicines at www.disposemymeds.org.          This list is accurate as of: 18 11:51 AM.  Always use your most recent med list.                   Brand Name Dispense Instructions for use Diagnosis    azithromycin 250  MG tablet    ZITHROMAX    3 tablet    Two tablets first day, then one tablet daily for four days.    Streptococcal pneumonia (H)       folic acid 1 MG tablet    FOLVITE    60 tablet    Take 1 tablet (1 mg) by mouth daily    Polysubstance dependence including opioid type drug with complication, continuous use (H)       levETIRAcetam 750 MG tablet    KEPPRA    120 tablet    Take 2 tablets (1,500 mg) by mouth 2 times daily    Status epilepticus, generalized convulsive (H)       melatonin 3 MG tablet     60 tablet    1 tablet (3 mg) by Oral or Feeding Tube route At Bedtime    Persistent disorder of initiating or maintaining sleep       multivitamin, therapeutic Tabs tablet     60 tablet    Take 1 tablet by mouth daily    Polysubstance dependence including opioid type drug with complication, continuous use (H)       thiamine 100 MG tablet     60 tablet    Take 1 tablet (100 mg) by mouth daily    Polysubstance dependence including opioid type drug with complication, continuous use (H)

## 2018-01-08 NOTE — NURSING NOTE
Bobby Howard comes into clinic today at the request of Self Ordering Provider for Mantoux.    See immunizations for more information.    This service provided today was under the supervising provider of the day Dr. West, who was available if needed.    Bronwyn Alfaro LPN at 11:50 AM on 1/8/2018.

## 2018-01-10 ENCOUNTER — TELEPHONE (OUTPATIENT)
Dept: INTERNAL MEDICINE | Facility: CLINIC | Age: 28
End: 2018-01-10

## 2018-01-10 NOTE — TELEPHONE ENCOUNTER
Spoke with a nurse, Tiarra. She reports that mantoux was negative, 0 diameter.  Time of reading :1/10/18, 2:40 pm  Mantoux site : left forearm    Mom was informed.    Soon-Mi  ---------------------------------------------------------        ----- Message from Tita Melgar sent at 1/10/2018 10:26 AM CST -----  Regarding: TB Testing   Contact: [phone]  Patient's mother Daylin called and stated patient was suppose to come back today to get her reading for her TB test. Patient was admitted into a rehab center and is not able to come in for that reading but there is a nurse at the rehab who can do the reading for the patient. Daylin just wants to make sure both places are aware of patient's results and what is going on. [facility] [place] [place] direct person to contact at . Patient's mother Daylin can be reached at [phone].

## 2018-01-15 LAB
FUNGUS SPEC CULT: NORMAL
SPECIMEN SOURCE: NORMAL

## 2018-01-18 NOTE — TELEPHONE ENCOUNTER
APPT INFO    Date /Time: 1/23/18   Reason for Appt: Seizures   Ref Provider/Clinic: [facility] d/c follow up   Are there internal records? Yes/No?  IF YES, list clinic names: Yes  See Above  Primary Care   Are there outside records? Yes/No? Yes  [facility] [place] [facility] are in [facility]   Patient Contact (Y/N) & Call Details: No referred   Action: Reviewed records; Records are in EPIC     OUTSIDE RECORDS CHECKLIST     CLINIC NAME COMMENTS REC (x) IMG (x)

## 2018-01-23 ENCOUNTER — PRE VISIT (OUTPATIENT)
Dept: NEUROLOGY | Facility: CLINIC | Age: 28
End: 2018-01-23

## 2018-02-13 ENCOUNTER — HOSPITAL ENCOUNTER (EMERGENCY)
Facility: CLINIC | Age: 28
Discharge: HOME | End: 2018-02-13
Attending: PSYCHIATRY & NEUROLOGY | Admitting: PSYCHIATRY & NEUROLOGY
Payer: MEDICAID

## 2018-02-13 VITALS
OXYGEN SATURATION: 97 % | TEMPERATURE: 97.2 F | DIASTOLIC BLOOD PRESSURE: 80 MMHG | WEIGHT: 134.4 LBS | BODY MASS INDEX: 24.73 KG/M2 | HEIGHT: 62 IN | SYSTOLIC BLOOD PRESSURE: 120 MMHG | HEART RATE: 84 BPM | RESPIRATION RATE: 16 BRPM

## 2018-02-13 DIAGNOSIS — F15.20 METHAMPHETAMINE DEPENDENCE (H): ICD-10-CM

## 2018-02-13 LAB
AMPHETAMINES UR QL SCN: NEGATIVE
BARBITURATES UR QL: NEGATIVE
BENZODIAZ UR QL: NEGATIVE
CANNABINOIDS UR QL SCN: NEGATIVE
COCAINE UR QL: NEGATIVE
ETHANOL UR QL SCN: NEGATIVE
HCG UR QL: NEGATIVE
OPIATES UR QL SCN: NEGATIVE

## 2018-02-13 PROCEDURE — 80320 DRUG SCREEN QUANTALCOHOLS: CPT | Performed by: PSYCHIATRY & NEUROLOGY

## 2018-02-13 PROCEDURE — 99283 EMERGENCY DEPT VISIT LOW MDM: CPT | Mod: Z6 | Performed by: PSYCHIATRY & NEUROLOGY

## 2018-02-13 PROCEDURE — 90791 PSYCH DIAGNOSTIC EVALUATION: CPT

## 2018-02-13 PROCEDURE — 81025 URINE PREGNANCY TEST: CPT | Performed by: PSYCHIATRY & NEUROLOGY

## 2018-02-13 PROCEDURE — 99285 EMERGENCY DEPT VISIT HI MDM: CPT | Mod: 25 | Performed by: PSYCHIATRY & NEUROLOGY

## 2018-02-13 PROCEDURE — 80307 DRUG TEST PRSMV CHEM ANLYZR: CPT | Performed by: PSYCHIATRY & NEUROLOGY

## 2018-02-13 ASSESSMENT — ENCOUNTER SYMPTOMS
DIZZINESS: 0
CHEST TIGHTNESS: 0
FEVER: 0
ABDOMINAL PAIN: 0
CONFUSION: 1
SHORTNESS OF BREATH: 0
HALLUCINATIONS: 0
DYSPHORIC MOOD: 0
BACK PAIN: 0

## 2018-02-13 NOTE — ED AVS SNAPSHOT
[facility], Emergency Department    [address]    [place] [address]    Phone:  [phone]    Fax:  [phone]                                       Bobby Howard   MRN: [id number]    Department:  [facility], Emergency Department   Date of Visit:  2/13/2018           Patient Information     Date Of Birth          1990        Your diagnoses for this visit were:     Methamphetamine dependence (H)        You were seen by Nolan Fraga MD.        Discharge Instructions       Follow up with treatment program at [facility]    Future Appointments        Provider Department Dept Phone Center    2/20/2018 10:00 AM Roosevelt Díaz MD [facility] Neurology [phone] [facility]    2/27/2018 10:35 AM Claudio Das MD [facility] [phone] [facility]      24 Hour Appointment Hotline       To make an appointment at any [facility], call [phone] ([phone]). If you don't have a family doctor or clinic, we will help you find one. [place] clinics are conveniently located to serve the needs of you and your family.             Review of your medicines      Our records show that you are taking the medicines listed below. If these are incorrect, please call your family doctor or clinic.        Dose / Directions Last dose taken    azithromycin 250 MG tablet   Commonly known as:  ZITHROMAX   Quantity:  3 tablet        Two tablets first day, then one tablet daily for four days.   Refills:  0        folic acid 1 MG tablet   Commonly known as:  FOLVITE   Dose:  1 mg   Quantity:  60 tablet        Take 1 tablet (1 mg) by mouth daily   Refills:  3        levETIRAcetam 750 MG tablet   Commonly known as:  KEPPRA   Dose:  1500 mg   Quantity:  120 tablet        Take 2 tablets (1,500 mg) by mouth 2 times daily   Refills:  3        melatonin 3 MG tablet   Dose:  3 mg   Quantity:  60 tablet        1 tablet (3 mg) by Oral or Feeding Tube route At Bedtime   Refills:  3     "    multivitamin, therapeutic Tabs tablet   Dose:  1 tablet   Quantity:  60 tablet        Take 1 tablet by mouth daily   Refills:  3        thiamine 100 MG tablet   Dose:  100 mg   Quantity:  60 tablet        Take 1 tablet (100 mg) by mouth daily   Refills:  3                Procedures and tests performed during your visit     Drug abuse screen 6 urine (tox)    HCG qualitative urine      Orders Needing Specimen Collection     None      Pending Results     No orders found from 2/11/2018 to 2/14/2018.            Pending Culture Results     No orders found from 2/11/2018 to 2/14/2018.            Pending Results Instructions     If you had any lab results that were not finalized at the time of your Discharge, you can call the ED Lab Result RN at [phone]. You will be contacted by this team for any positive Lab results or changes in treatment. The nurses are available 7 days a week from 10A to 6:30P.  You can leave a message 24 hours per day and they will return your call.        Thank you for choosing [facility]       Thank you for choosing [facility] for your care. Our goal is always to provide you with excellent care. Hearing back from our patients is one way we can continue to improve our services. Please take a few minutes to complete the written survey that you may receive in the mail after you visit with us. Thank you!        [employer] Information     Appirio lets you send messages to your doctor, view your test results, renew your prescriptions, schedule appointments and more. To sign up, go to [email or web address] . Click on \"Log in\" on the left side of the screen, which will take you to the Welcome page. Then click on \"Sign up Now\" on the right side of the page.     You will be asked to enter the access code listed below, as well as some personal information. Please follow the directions to create your username and password.     Your access code is: [email or web address]  Expires: 3/16/2018 10:28 PM     Your access " code will  in 90 days. If you need help or a new code, please call your [place] clinic or [phone].        Care EveryWhere ID     This is your Care EveryWhere ID. This could be used by other organizations to access your [place] medical records  [id number]        Equal Access to Services     RADHA RESENDEZ : Mateus rooney Sodelilah, waaxda luqadaha, qaybta kaalmada goran, shana pichardo. So [facility] [phone].    ATENCIÓN: Si habla español, tiene a hairston disposición servicios gratuitos de asistencia lingüística. Llame al [phone].    We comply with applicable federal civil rights laws and [place] laws. We do not discriminate on the basis of race, color, national origin, age, disability, sex, sexual orientation, or gender identity.            After Visit Summary       This is your record. Keep this with you and show to your community pharmacist(s) and doctor(s) at your next visit.

## 2018-02-13 NOTE — ED AVS SNAPSHOT
[facility] [place], Emergency Department    [address]    [facility] [address]    Phone:  [phone]    Fax:  [phone]                                       Bobby Howard   MRN: [id number]    Department:  [facility], Emergency Department   Date of Visit:  2/13/2018           After Visit Summary Signature Page     I have received my discharge instructions, and my questions have been answered. I have discussed any challenges I see with this plan with the nurse or doctor.    ..........................................................................................................................................  Patient/Patient Representative Signature      ..........................................................................................................................................  Patient Representative Print Name and Relationship to Patient    ..................................................               ................................................  Date                                            Time    ..........................................................................................................................................  Reviewed by Signature/Title    ...................................................              ..............................................  Date                                                            Time
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
## 2018-02-14 NOTE — ED PROVIDER NOTES
"  History     Chief Complaint   Patient presents with     Psychiatric Evaluation     At [place] today. Needs mental health assessment. Denies SI. Pt appears anxious. H/O seizure a wk before [place]     The history is provided by the patient and medical records ([facility] staff).     Bobby Howard is a 27 year old female who comes in due to her acting bizarrely when she first arrived at [facility].   She is court ordered from [place] to go to  treatment for methamphetamine dependence at [facility].  She arrived today. She was not making sense, seemed out of it and very sleepy.  They were concerned that she either had used or was having some mental health issue.  She denies having any mental health issues.  She is upset about losing her kids to CPS in [place] due to her use but understands that she needs to comply with the court's plan.  She is tracking well and understands the situation.  Her drug screen was negative.  She denies any suicidal or homicidal thoughts. She denies being depressed or anxious (other than what was mentioned above).  She denies any hallucinations.      Please see the 's assessment in [facility] from today for further details.    I have reviewed the Medications, Allergies, Past Medical and Surgical History, and Social History in the Epic system.    Review of Systems   Constitutional: Negative for fever.   Eyes: Negative for visual disturbance.   Respiratory: Negative for chest tightness and shortness of breath.    Cardiovascular: Negative for chest pain.   Gastrointestinal: Negative for abdominal pain.   Musculoskeletal: Negative for back pain.   Neurological: Negative for dizziness.   Psychiatric/Behavioral: Positive for confusion. Negative for dysphoric mood, hallucinations, self-injury and suicidal ideas.   All other systems reviewed and are negative.      Physical Exam   BP: 119/78  Heart Rate: 80  Temp: 97.2  F (36.2  C)  Resp: 18  Height: 157.5 cm (5' 2\")  Weight: 61 kg (134 lb 6.4 " oz)  SpO2: 97 %      Physical Exam   Constitutional: She is oriented to person, place, and time. She appears well-developed and well-nourished.   HENT:   Head: Normocephalic and atraumatic.   Mouth/Throat: Oropharynx is clear and moist.   Eyes: Pupils are equal, round, and reactive to light.   Neck: Normal range of motion. Neck supple.   Cardiovascular: Normal rate, regular rhythm and normal heart sounds.    Pulmonary/Chest: Effort normal and breath sounds normal.   Abdominal: Soft. Bowel sounds are normal.   Musculoskeletal: Normal range of motion.   Neurological: She is alert and oriented to person, place, and time.   Skin: Skin is warm and dry.   Psychiatric: She has a normal mood and affect. Her speech is normal and behavior is normal. Judgment and thought content normal. She is not actively hallucinating. Thought content is not paranoid and not delusional. Cognition and memory are normal. She expresses no homicidal and no suicidal ideation. She expresses no suicidal plans and no homicidal plans.   Bobby is a 28 y/o female who looks her age.  She is well groomed with good eye contact.   Nursing note and vitals reviewed.      ED Course     ED Course     Procedures               Labs Ordered and Resulted from Time of ED Arrival Up to the Time of Departure from the ED   DRUG ABUSE SCREEN 6 CHEM DEP URINE ([facility])   HCG QUALITATIVE URINE            Assessments & Plan (with Medical Decision Making)   Bobby will be discharged to [place].  She is not an imminent risk to herself or others.  She is tracking and making sense.  She understands what is going on although does have a little bit of a flippant attitude and is somewhat impatient.  Her drug screen was negative.  There is nothing currently evident that would preclude her to attend and participate her CD treatment program.    I have reviewed the nursing notes.    I have reviewed the findings, diagnosis, plan and need for follow up with the patient.    New  Prescriptions    No medications on file       Final diagnoses:   Methamphetamine dependence (H)       2/13/2018   [facility], [place], EMERGENCY DEPARTMENT     Nolan Fraga MD  02/13/18 [id number]

## 2018-02-27 ENCOUNTER — OFFICE VISIT (OUTPATIENT)
Dept: INTERNAL MEDICINE | Facility: CLINIC | Age: 28
End: 2018-02-27
Payer: MEDICAID

## 2018-02-27 VITALS
WEIGHT: 135.8 LBS | HEART RATE: 58 BPM | DIASTOLIC BLOOD PRESSURE: 68 MMHG | BODY MASS INDEX: 24.84 KG/M2 | SYSTOLIC BLOOD PRESSURE: 107 MMHG

## 2018-02-27 DIAGNOSIS — G47.00 PERSISTENT DISORDER OF INITIATING OR MAINTAINING SLEEP: ICD-10-CM

## 2018-02-27 RX ORDER — LANOLIN ALCOHOL/MO/W.PET/CERES
3-6 CREAM (GRAM) TOPICAL AT BEDTIME
Qty: 60 TABLET | Refills: 3 | Status: SHIPPED | OUTPATIENT
Start: 2018-02-27

## 2018-02-27 ASSESSMENT — PAIN SCALES - GENERAL: PAINLEVEL: WORST PAIN (10)

## 2018-02-27 NOTE — MR AVS SNAPSHOT
After Visit Summary   2018    Bobby Howard    MRN: [id number]           Patient Information     Date Of Birth          1990        Visit Information        Provider Department      2018 10:35 AM Claudio Das MD [facility]        Today's Diagnoses     Persistent disorder of initiating or maintaining sleep           Follow-ups after your visit        Your next 10 appointments already scheduled     Mar 05, 2018 11:00 AM CST   (Arrive by 10:45 AM)   New Seizure with Dolly Lloyd MD   [facility] Neurology ([facility])    [address] [place], [place] [id number] [phone]              Who to contact     Please call your clinic at [phone] to:    Ask questions about your health    Make or cancel appointments    Discuss your medicines    Learn about your test results    Speak to your doctor            Additional Information About Your Visit        MyChart Information     Funbuiltt is an electronic gateway that provides easy, online access to your medical records. With [employer] you can request a clinic appointment, read your test results, renew a prescription or communicate with your care team.     To sign up for [employer]tt visit the website at [email or web address]   You will be asked to enter the access code listed below, as well as some personal information. Please follow the directions to create your username and password.     Your access code is: [email or web address]  Expires: 3/16/2018 10:28 PM     Your access code will  in 90 days. If you need help or a new code, please contact your [facility] or call [phone] for assistance.        Care EveryWhere ID     This is your Care EveryWhere ID. This could be used by other organizations to access your [place] medical records  [id number]        Your Vitals Were     Pulse BMI (Body Mass Index)                58 24.84 kg/m2            Blood Pressure from Last 3 Encounters:   02/27/18 107/68   02/13/18 120/80   01/04/18 104/68    Weight from Last 3 Encounters:   02/27/18 61.6 kg (135 lb 12.8 oz)   02/13/18 61 kg (134 lb 6.4 oz)   01/04/18 58.5 kg (129 lb)              Today, you had the following     No orders found for display         Today's Medication Changes          These changes are accurate as of 2/27/18 11:40 AM.  If you have any questions, ask your nurse or doctor.               These medicines have changed or have updated prescriptions.        Dose/Directions    melatonin 3 MG tablet   This may have changed:  how much to take   Used for:  Persistent disorder of initiating or maintaining sleep   Changed by:  Claudio Das MD        Dose:  3-6 mg   1-2 tablets (3-6 mg) by Oral or Feeding Tube route At Bedtime   Quantity:  60 tablet   Refills:  3         Stop taking these medicines if you haven't already. Please contact your care team if you have questions.     azithromycin 250 MG tablet   Commonly known as:  ZITHROMAX   Stopped by:  Claudio Das MD                Where to get your medicines      These medications were sent to [id number] - [facility] [address] [facility]  [address] [place] [address] [facility] [address]     Phone:  [phone]     melatonin 3 MG tablet                Primary Care Provider Office Phone # Fax #    Cash Dotson [phone]       [address] [facility] [address]        Equal Access to Services     ANT RESENDEZ : Hadii dick barreto haddavido Sodelilah, waaxda luqadaha, qaybta kaalmada adeegyada, shana pichardo. So [facility] [phone].    ATENCIÓN: Si habla español, tiene a hairston disposición servicios gratuitos de asistencia lingüística. Llcamila al [phone].    We comply with applicable federal civil rights laws and [place] laws. We do not discriminate on the basis of race, color, national origin, age, disability,  sex, sexual orientation, or gender identity.            Thank you!     Thank you for choosing [facility] PR[facility]ARE [facility]INIC  for your care. Our goal is always to provide you with excellent care. Hearing back from our patients is one way we can continue to improve our services. Please take a few minutes to complete the written survey that you may receive in the mail after your visit with us. Thank you!             Your Updated Medication List - Protect others around you: Learn how to safely use, store and throw away your medicines at www.disposemymeds.org.          This list is accurate as of 2/27/18 11:40 AM.  Always use your most recent med list.                   Brand Name Dispense Instructions for use Diagnosis    folic acid 1 MG tablet    FOLVITE    60 tablet    Take 1 tablet (1 mg) by mouth daily    Polysubstance dependence including opioid type drug with complication, continuous use (H)       levETIRAcetam 750 MG tablet    KEPPRA    120 tablet    Take 2 tablets (1,500 mg) by mouth 2 times daily    Status epilepticus, generalized convulsive (H)       melatonin 3 MG tablet     60 tablet    1-2 tablets (3-6 mg) by Oral or Feeding Tube route At Bedtime    Persistent disorder of initiating or maintaining sleep       multivitamin, therapeutic Tabs tablet     60 tablet    Take 1 tablet by mouth daily    Polysubstance dependence including opioid type drug with complication, continuous use (H)       thiamine 100 MG tablet     60 tablet    Take 1 tablet (100 mg) by mouth daily    Polysubstance dependence including opioid type drug with complication, continuous use (H)

## 2018-02-27 NOTE — PROGRESS NOTES
Pt was seen and examined with Dr. Das.  I agree with her documentation as noted above.    My additional comments: none    Tammy Peralta MD

## 2018-03-05 ENCOUNTER — OFFICE VISIT (OUTPATIENT)
Dept: NEUROLOGY | Facility: CLINIC | Age: 28
End: 2018-03-05
Payer: COMMERCIAL

## 2018-03-05 ENCOUNTER — APPOINTMENT (OUTPATIENT)
Dept: LAB | Facility: CLINIC | Age: 28
End: 2018-03-05
Payer: COMMERCIAL

## 2018-03-05 ENCOUNTER — HEALTH MAINTENANCE LETTER (OUTPATIENT)
Age: 28
End: 2018-03-05

## 2018-03-05 VITALS
BODY MASS INDEX: 23.48 KG/M2 | WEIGHT: 132.5 LBS | RESPIRATION RATE: 20 BRPM | HEIGHT: 63 IN | HEART RATE: 80 BPM | TEMPERATURE: 98.9 F | SYSTOLIC BLOOD PRESSURE: 121 MMHG | DIASTOLIC BLOOD PRESSURE: 82 MMHG | OXYGEN SATURATION: 99 %

## 2018-03-05 DIAGNOSIS — G40.901 STATUS EPILEPTICUS, GENERALIZED CONVULSIVE (H): Primary | ICD-10-CM

## 2018-03-05 DIAGNOSIS — R41.3 MEMORY LOSS: ICD-10-CM

## 2018-03-05 ASSESSMENT — PAIN SCALES - GENERAL: PAINLEVEL: NO PAIN (0)

## 2018-03-05 NOTE — MR AVS SNAPSHOT
After Visit Summary   3/5/2018    Bobby Howard    MRN: [id number]           Patient Information     Date Of Birth          1990        Visit Information        Provider Department      3/5/2018 11:00 AM Roosevelt Díaz MD [facility] Neurology        Today's Diagnoses     Status epilepticus, generalized convulsive (H)    -  1    Memory loss           Follow-ups after your visit        Additional Services     Neuropsych Testing Referral - [facility]       Please schedule a visit to complete Neuropsychological Testing at [facility].                  Follow-up notes from your care team     Return in about 3 months (around 6/5/2018).      Your next 10 appointments already scheduled     Mar 05, 2018  1:15 PM CST   LAB with  LAB   [facility] ([facility])    [address] [place] [id number] [phone]           Please do not eat 10-12 hours before your appointment if you are coming in fasting for labs on lipids, cholesterol, or glucose (sugar). This does not apply to pregnant women. Water, hot tea and black coffee (with nothing added) are okay. Do not drink other fluids, diet soda or chew gum.            Mar 19, 2018  6:15 PM CDT   (Arrive by 6:00 PM)   MR BRAIN W/O & W CONTRAST with [id number]   [facility] ([facility])    [address] [place] [id number] [phone]           Take your medicines as usual, unless your doctor tells you not to. Bring a list of your current medicines to your exam (including vitamins, minerals and over-the-counter drugs).  You may or may not receive intravenous (IV) contrast for this exam pending the discretion of the Radiologist.  You do not need to do anything special to prepare.  The MRI machine uses a strong magnet. Please wear clothes without metal (snaps, zippers). A sweatsuit works well, or we may give you a hospital gown.  Please remove any body  piercings and hair extensions before you arrive. You will also remove watches, jewelry, hairpins, wallets, dentures, partial dental plates and hearing aids. You may wear contact lenses, and you may be able to wear your rings. We have a safe place to keep your personal items, but it is safer to leave them at home.  **IMPORTANT** THE INSTRUCTIONS BELOW ARE ONLY FOR THOSE PATIENTS WHO HAVE BEEN PRESCRIBED SEDATION OR GENERAL ANESTHESIA DURING THEIR MRI PROCEDURE:  IF YOUR DOCTOR PRESCRIBED ORAL SEDATION (take medicine to help you relax during your exam):   You must get the medicine from your doctor (oral medication) before you arrive. Bring the medicine to the exam. Do not take it at home. You ll be told when to take it upon arriving for your exam.   Arrive one hour early. Bring someone who can take you home after the test. Your medicine will make you sleepy. After the exam, you may not drive, take a bus or take a taxi by yourself.  IF YOUR DOCTOR PRESCRIBED IV SEDATION:   Arrive one hour early. Bring someone who can take you home after the test. Your medicine will make you sleepy. After the exam, you may not drive, take a bus or take a taxi by yourself.   No eating 6 hours before your exam. You may have clear liquids up until 4 hours before your exam. (Clear liquids include water, clear tea, black coffee and fruit juice without pulp.)  IF YOUR DOCTOR PRESCRIBED ANESTHESIA (be asleep for your exam):   Arrive 1 1/2 hours early. Bring someone who can take you home after the test. You may not drive, take a bus or take a taxi by yourself.   No eating 8 hours before your exam. You may have clear liquids up until 4 hours before your exam. (Clear liquids include water, clear tea, black coffee and fruit juice without pulp.)   You will spend four to five hours in the recovery room.  Please call the Imaging Department at your exam site with any questions.              Future tests that were ordered for you today     Open Future  "Orders        Priority Expected Expires Ordered    EEG video monitoring Routine 3/6/2018 3/5/2019 3/5/2018    MR Brain w/o & w Contrast Routine 3/6/2018 3/5/2019 3/5/2018            Who to contact     Please call your clinic at [phone] to:    Ask questions about your health    Make or cancel appointments    Discuss your medicines    Learn about your test results    Speak to your doctor            Additional Information About Your Visit        [facility] Information     [employer] is an electronic gateway that provides easy, online access to your medical records. With [employer], you can request a clinic appointment, read your test results, renew a prescription or communicate with your care team.     To sign up for [employer] visit the website at [email or web address]   You will be asked to enter the access code listed below, as well as some personal information. Please follow the directions to create your username and password.     Your access code is: [email or web address]  Expires: 3/16/2018 10:28 PM     Your access code will  in 90 days. If you need help or a new code, please contact your [facility] or call [phone] for assistance.        Care EveryWhere ID     This is your Care EveryWhere ID. This could be used by other organizations to access your [place] medical records  [id number]        Your Vitals Were     Pulse Temperature Respirations Height Pulse Oximetry Breastfeeding?    80 98.9  F (37.2  C) (Oral) 20 1.588 m (5' 2.5\") 99% No    BMI (Body Mass Index)                   23.85 kg/m2            Blood Pressure from Last 3 Encounters:   18 121/82   18 107/68   18 120/80    Weight from Last 3 Encounters:   18 60.1 kg (132 lb 8 oz)   18 61.6 kg (135 lb 12.8 oz)   18 61 kg (134 lb 6.4 oz)              We Performed the Following     Keppra (Levetiracetam) Level     Neuropsych Testing Referral - [facility]        Primary Care Provider Office Phone " # Fax #    Socratesmarshall Fausto Dotson [phone] [address] [facility] [address]        Equal Access to Services     RADHA RESENDEZ : Hadii aad ku haddavidfrederick Katherinedelilah, wamukulda luqadaha, qaoneilta kaalmada goran, shana gamble laAshleyonel pichardo. So [facility] [phone].    ATENCIÓN: Si habla español, tiene a hairston disposición servicios gratuitos de asistencia lingüística. Llame al [phone].    We comply with applicable federal civil rights laws and [place] laws. We do not discriminate on the basis of race, color, national origin, age, disability, sex, sexual orientation, or gender identity.            Thank you!     Thank you for choosing [facility] NEUROLOGY  for your care. Our goal is always to provide you with excellent care. Hearing back from our patients is one way we can continue to improve our services. Please take a few minutes to complete the written survey that you may receive in the mail after your visit with us. Thank you!             Your Updated Medication List - Protect others around you: Learn how to safely use, store and throw away your medicines at www.disposemymeds.org.          This list is accurate as of 3/5/18  1:10 PM.  Always use your most recent med list.                   Brand Name Dispense Instructions for use Diagnosis    folic acid 1 MG tablet    FOLVITE    60 tablet    Take 1 tablet (1 mg) by mouth daily    Polysubstance dependence including opioid type drug with complication, continuous use (H)       levETIRAcetam 750 MG tablet    KEPPRA    120 tablet    Take 2 tablets (1,500 mg) by mouth 2 times daily    Status epilepticus, generalized convulsive (H)       melatonin 3 MG tablet     60 tablet    1-2 tablets (3-6 mg) by Oral or Feeding Tube route At Bedtime    Persistent disorder of initiating or maintaining sleep       multivitamin, therapeutic Tabs tablet     60 tablet    Take 1 tablet by mouth daily    Polysubstance dependence including opioid type  drug with complication, continuous use (H)       thiamine 100 MG tablet     60 tablet    Take 1 tablet (100 mg) by mouth daily    Polysubstance dependence including opioid type drug with complication, continuous use (H)

## 2018-03-05 NOTE — NURSING NOTE
Chief Complaint   Patient presents with     Consult     [facility]- SEIZURES, HOSP F/U     Amaury Fox, CMA

## 2018-03-05 NOTE — LETTER
3/5/2018       RE: Bobby Howard  [address] [facility] [address]     Dear Colleague,    Thank you for referring your patient, Bobby Howard, to the [facility] NEUROLOGY at [facility]. Please see a copy of my visit note below.      [facility]:  NEW PATIENT EVALUATION    HISTORY:  Ms. Bobby Howard is a 27-year-old, right-handed woman who was referred by Dr. Tammy Peralta for evaluation of generalized tonic-clonic and possible complex partial seizures.  The patient was able to provide only limited medical history, mainly pertaining to current symptoms.  I reviewed extensive medical records on the [facility] chart.      Ictal semiology-history:   Generalized tonic-clonic seizures were diagnosed by direct observation of medical personnel during generalized convulsive status epilepticus on 12/16/2017.  Five generalized convulsions were witnessed by various medical personnel during a period of time in which first-line agents failed to control seizures and intubation failed transiently prior to successful initiation of propofol with mechanical ventilation.  The patient was transferred from an outside hospital to the [facility] and subsequently was transferred to [facility] to support ongoing video EEG monitoring.  The report of video EEG Monitoring indicates that the patient already had seizures fully controlled with sedated coma on propofol and no epileptiform abnormalities were recorded during 6 days of monitoring.  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  Examination of CSF revealed oligoclonal bands consistent with this injury, but infectious causes of convulsive status epilepticus were excluded.  The patient was discharged to residential chemical dependency care on 12/22/2017 on chronic levetiracetam.  She reportedly has not had any recurrent  grand mal seizures following discharge.      Various notes indicate that the patient had staring spells which feature unresponsiveness to voice and touch, lasting from seconds to minutes, in 2017.  The attendant who accompanied her from the [facility] has not witnessed staring spells.  The staff there have noted continuous impairment of memory.      The patient herself cannot provide any history regarding whether or not any type of seizure has occurred before the episode of generalized convulsive status in 12/2017.  The chart does not note any history of seizures prior to that time.        Epilepsy-seizure predispositions:   Apparently the patient's father was interviewed by medical staff at [facility], and he did not report any family history of seizures or epilepsy or any personal epilepsy risk factors of the patient aside from polysubstance abuse including particularly amphetamines and opioids.  She was not known to have sustained respiratory arrest for cardiac arrest in association with this substance use.        Laboratory evaluations:   A brain MRI was performed at [facility] on 12/16/2017, and this was reported to show subacute and chronic leukoencephalopathy with features consistent with toxic leukoencephalopathy and particularly bifrontal encephalomalacia, greater on the right than on the left with associated areas of cortical laminar necrosis.      Video-EEG monitoring was performed on 12/16 to 12/21/2017, during which there were initial abnormalities consistent with propofol-induced coma, followed by resumption of wake-sleep cycling after discontinuation of propofol, during which time there was generalized delta-theta slowing during waking; no electrographic seizures and no definite interictal epileptiform abnormalities were observed during the 6 days of monitoring.      CSF examination on 12/17/2017 showed only oligoclonal bands consistent with toxic leukoencephalopathy with no  evidence of infection.     Epilepsy therapeutics:   The patient was discharged from [facility] on 2017, with levetiracetam as her only anti-seizure medication.      PAST MEDICAL-SURGICAL HISTORY:    1.  History of generalized convulsive status epilepticus (2017).   2.  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   3.  History of polysubstance abuse with amphetamines and opioids, reportedly in early remission.   4.  Status post  section.     FAMILY HISTORY:  There is no family history of seizures, epilepsy or other neurologic conditions available in the medical record.     PERSONAL AND SOCIAL HISTORY:  The patient remembers that she grew up in [place] and moved to [place] in 2017, although she could not state why she moved to [place].  She left school in the 10th grade, but later obtained a GED.  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  She has 3 children, who currently are living with older relatives.  She smokes 1-2 packs of cigarettes per day.  Currently, she does not use ethanol or illicit recreational drugs and she and living in the [facility].     REVIEW OF SYSTEMS:  The patient complained of being unable to remember many things.  She repeatedly complained that people were preventing her from being with her children.      The remainder of a 14-system symptom review was negative for current symptoms, except as noted above; the patient repeatedly stated that she could not remember past symptoms.       MEDICATIONS:  Levetiracetam 1500 mg b.i.d., and other medications as per the electronic medical record.     PHYSICAL EXAMINATION:    On examination, the patient was a continuously alert and restless young woman in no apparent physical distress.  Pulse was 80,  blood pressure was 121/82.  Respirations were 20 per minute.  Height was 63 inches.  Weight was 133 pounds.  Skull was normocephalic and atraumatic.  Skull was normocephalic and atraumatic.  Neck was supple, without signs of meningeal irritation.      On neurological examination the patient appeared alert and was oriented to person, place, and year only.  The patient had a continuously restless and usually silly demeanor with multiple childish utterances and frequent giggling and laughing, but also occasional crying.  Lexicon and grammar were grossly normal, and speech content was simplified.  She was unable or unwilling to perform Digit Span and Phrase Recall.      Fundoscopy was normal bilaterally.  Cranial nerves III through XII were normal.  Muscle masses, tones and strengths were normal throughout.  There was no pronator drift.  Sequential fine finger movements were performed normally with each hand.  No spontaneous tremors, myoclonus, or other abnormal movements were observed.  Sensations of light touch, pin prick, vibration and proprioception were reportedly normal throughout.  The rapid alternating movements, and finger-nose-finger and heel-shin maneuvers were performed normally bilaterally.  Romberg maneuver was negative.  Regular, heel, toe, tandem and reverse tandem walking were normal.  Deep tendon reflexes were normal and symmetric throughout.  Toes were downgoing bilaterally.      IMPRESSION:    The patient apparently has a dementia syndrome that reportedly affects most or all areas of cognition based on the impression of prior treating physicians.  This may well be attributable to the effects of amphetamines and opioids with generalized convulsive status epilepticus.  I think it would be highly useful to review comprehensive neuropsychological testing, in particular because some of her behavior suggestive a possible psychological component to her apparent dementia, suggesting that this may not be  entirely a static set of cognitive deficits.  It is also possible that the patient is having complex partial seizures that are not recognized at this point and we will also arrange for outpatient electroencephalography to screen for any new development of interictal epileptiform abnormalities.       The patient had slight enhancement in some areas of apparent leukoencephalopathy.  Given that her structural lesion may not be static and possible contributory factors other than substance abuse in status epilepticus may be present, we also will proceed with a followup contrast and noncontrast brain MRI.      I think it makes sense to continue with levetiracetam in the absence of any grossly apparent adverse effects.  I could not find a levetiracetam level in steady state on the chart, so we will obtain this today.  Apparently her residential treatment facility has access to onsite medical care and we leave the levetiracetam prescription with this provider.      The patient does not have a car and would not be allowed to drive by the staff of the residential treatment facility.     PLAN:    1.  Brain MRI with seizure protocol.   2.  Outpatient 3-hour video EEG.   3.  Comprehensive neuropsychological evaluation.   4.  Levetiracetam level.   5.  I did not recommend any changes in the levetiracetam dose and will leave the prescription with the patient's primary care physician.   6.  Return visit in approximately 3 months.   I spent 65 minutes in this patient care, more than 50% of which consisted of counseling and coordinating care.       D: 2018   T: 2018   MT: [id number]      Name:     NAEEM OLIVARES   MRN:      [id number]        Account:      [id number]   :      1990           Service Date: 2018      Document: [id number]        Again, thank you for allowing me to participate in the care of your patient.      Sincerely,    Roosevelt Díaz MD

## 2018-03-07 LAB — LEVETIRACETAM SERPL-MCNC: 33 UG/ML (ref 12–46)

## 2018-03-07 NOTE — PROGRESS NOTES
[facility]:  NEW PATIENT EVALUATION    HISTORY:  Ms. Bobby Howard is a 27-year-old, right-handed woman who was referred by Dr. Tammy Peralta for evaluation of generalized tonic-clonic and possible complex partial seizures.  The patient was able to provide only limited medical history, mainly pertaining to current symptoms.  I reviewed extensive medical records on the [facility] chart.      Ictal semiology-history:   Generalized tonic-clonic seizures were diagnosed by direct observation of medical personnel during generalized convulsive status epilepticus on 12/16/2017.  Five generalized convulsions were witnessed by various medical personnel during a period of time in which first-line agents failed to control seizures and intubation failed transiently prior to successful initiation of propofol with mechanical ventilation.  The patient was transferred from an outside hospital to the [facility] and subsequently was transferred to [facility] to support ongoing video EEG monitoring.  The report of video EEG Monitoring indicates that the patient already had seizures fully controlled with sedated coma on propofol and no epileptiform abnormalities were recorded during 6 days of monitoring.  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  Examination of CSF revealed oligoclonal bands consistent with this injury, but infectious causes of convulsive status epilepticus were excluded.  The patient was discharged to residential chemical dependency care on 12/22/2017 on chronic levetiracetam.  She reportedly has not had any recurrent grand mal seizures following discharge.      Various notes indicate that the patient had staring spells which feature unresponsiveness to voice and touch, lasting from seconds to minutes, in 2017.  The attendant who accompanied her from the [facility] has not witnessed  staring spells.  The staff there have noted continuous impairment of memory.      The patient herself cannot provide any history regarding whether or not any type of seizure has occurred before the episode of generalized convulsive status in 12/2017.  The chart does not note any history of seizures prior to that time.        Epilepsy-seizure predispositions:   Apparently the patient's father was interviewed by medical staff at [facility], and he did not report any family history of seizures or epilepsy or any personal epilepsy risk factors of the patient aside from polysubstance abuse including particularly amphetamines and opioids.  She was not known to have sustained respiratory arrest for cardiac arrest in association with this substance use.        Laboratory evaluations:   A brain MRI was performed at [facility] on 12/16/2017, and this was reported to show subacute and chronic leukoencephalopathy with features consistent with toxic leukoencephalopathy and particularly bifrontal encephalomalacia, greater on the right than on the left with associated areas of cortical laminar necrosis.      Video-EEG monitoring was performed on 12/16 to 12/21/2017, during which there were initial abnormalities consistent with propofol-induced coma, followed by resumption of wake-sleep cycling after discontinuation of propofol, during which time there was generalized delta-theta slowing during waking; no electrographic seizures and no definite interictal epileptiform abnormalities were observed during the 6 days of monitoring.      CSF examination on 12/17/2017 showed only oligoclonal bands consistent with toxic leukoencephalopathy with no evidence of infection.     Epilepsy therapeutics:   The patient was discharged from [facility] on 12/22/2017, with levetiracetam as her only anti-seizure medication.      PAST MEDICAL-SURGICAL HISTORY:    1.  History of generalized convulsive  status epilepticus (2017).   2.  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   3.  History of polysubstance abuse with amphetamines and opioids, reportedly in early remission.   4.  Status post  section.     FAMILY HISTORY:  There is no family history of seizures, epilepsy or other neurologic conditions available in the medical record.     PERSONAL AND SOCIAL HISTORY:  The patient remembers that she grew up in [place] and moved to [place] in 2017, although she could not state why she moved to [place].  She left school in the 10th grade, but later obtained a GED.  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  She has 3 children, who currently are living with older relatives.  She smokes 1-2 packs of cigarettes per day.  Currently, she does not use ethanol or illicit recreational drugs and she and living in the [facility].     REVIEW OF SYSTEMS:  The patient complained of being unable to remember many things.  She repeatedly complained that people were preventing her from being with her children.      The remainder of a 14-system symptom review was negative for current symptoms, except as noted above; the patient repeatedly stated that she could not remember past symptoms.       MEDICATIONS:  Levetiracetam 1500 mg b.i.d., and other medications as per the electronic medical record.     PHYSICAL EXAMINATION:    On examination, the patient was a continuously alert and restless young woman in no apparent physical distress.  Pulse was 80, blood pressure was 121/82.  Respirations were 20 per minute.  Height was 63 inches.  Weight was 133 pounds.  Skull was normocephalic and atraumatic.  Skull was normocephalic and atraumatic.  Neck was supple, without signs of meningeal irritation.      On neurological  examination the patient appeared alert and was oriented to person, place, and year only.  The patient had a continuously restless and usually silly demeanor with multiple childish utterances and frequent giggling and laughing, but also occasional crying.  Lexicon and grammar were grossly normal, and speech content was simplified.  She was unable or unwilling to perform Digit Span and Phrase Recall.      Fundoscopy was normal bilaterally.  Cranial nerves III through XII were normal.  Muscle masses, tones and strengths were normal throughout.  There was no pronator drift.  Sequential fine finger movements were performed normally with each hand.  No spontaneous tremors, myoclonus, or other abnormal movements were observed.  Sensations of light touch, pin prick, vibration and proprioception were reportedly normal throughout.  The rapid alternating movements, and finger-nose-finger and heel-shin maneuvers were performed normally bilaterally.  Romberg maneuver was negative.  Regular, heel, toe, tandem and reverse tandem walking were normal.  Deep tendon reflexes were normal and symmetric throughout.  Toes were downgoing bilaterally.      IMPRESSION:    The patient apparently has a dementia syndrome that reportedly affects most or all areas of cognition based on the impression of prior treating physicians.  This may well be attributable to the effects of amphetamines and opioids with generalized convulsive status epilepticus.  I think it would be highly useful to review comprehensive neuropsychological testing, in particular because some of her behavior suggestive a possible psychological component to her apparent dementia, suggesting that this may not be entirely a static set of cognitive deficits.  It is also possible that the patient is having complex partial seizures that are not recognized at this point and we will also arrange for outpatient electroencephalography to screen for any new development of interictal  epileptiform abnormalities.       The patient had slight enhancement in some areas of apparent leukoencephalopathy.  Given that her structural lesion may not be static and possible contributory factors other than substance abuse in status epilepticus may be present, we also will proceed with a followup contrast and noncontrast brain MRI.      I think it makes sense to continue with levetiracetam in the absence of any grossly apparent adverse effects.  I could not find a levetiracetam level in steady state on the chart, so we will obtain this today.  Apparently her residential treatment facility has access to onsite medical care and we leave the levetiracetam prescription with this provider.      The patient does not have a car and would not be allowed to drive by the staff of the residential treatment facility.     PLAN:    1.  Brain MRI with seizure protocol.   2.  Outpatient 3-hour video EEG.   3.  Comprehensive neuropsychological evaluation.   4.  Levetiracetam level.   5.  I did not recommend any changes in the levetiracetam dose and will leave the prescription with the patient's primary care physician.   6.  Return visit in approximately 3 months.   I spent 65 minutes in this patient care, more than 50% of which consisted of counseling and coordinating care.       Roosevelt Díaz M.D.   Professor of Neurology         D: 2018   T: 2018   MT: MITZI      Name:     NAEEM OLIVARES   MRN:      [id number]        Account:      [id number]   :      1990           Service Date: 2018      Document: [id number]

## 2018-03-08 ENCOUNTER — TELEPHONE (OUTPATIENT)
Dept: NEUROLOGY | Facility: CLINIC | Age: 28
End: 2018-03-08

## 2018-03-08 NOTE — TELEPHONE ENCOUNTER
Bobby is reporting severe migraines beginning Monday. She is currently residing at [facility] (Phone number: [phone]). Incoming call received from JORGE Biggs at [facility] who calls to discuss migraine headache and pain relief medications. Advised Dian to contact PCP regarding pain management for migraine. Dian verbalized agreement.  In addition, orders were placed for 3 hour video EEG and Comprehensive Neuropsychological evaluation. Advised Dian that someone will be in contact to help schedule these appointments.

## 2018-03-12 ENCOUNTER — ALLIED HEALTH/NURSE VISIT (OUTPATIENT)
Dept: NEUROLOGY | Facility: CLINIC | Age: 28
End: 2018-03-12
Payer: COMMERCIAL

## 2018-03-12 DIAGNOSIS — G40.901 STATUS EPILEPTICUS, GENERALIZED CONVULSIVE (H): ICD-10-CM

## 2018-03-12 NOTE — MR AVS SNAPSHOT
After Visit Summary   3/12/2018    Bobby Howard    MRN: [id number]           Patient Information     Date Of Birth          1990        Visit Information        Provider Department      3/12/2018 1:00 PM [facility]        Today's Diagnoses     Status epilepticus, generalized convulsive (H)           Follow-ups after your visit        Your next 10 appointments already scheduled     Mar 19, 2018  6:15 PM CDT   (Arrive by 6:00 PM)   MR BRAIN W/O & W CONTRAST with [id number]   [facility] MRI ([facility])    [address] [place] [id number] [phone]           Take your medicines as usual, unless your doctor tells you not to. Bring a list of your current medicines to your exam (including vitamins, minerals and over-the-counter drugs).  You may or may not receive intravenous (IV) contrast for this exam pending the discretion of the Radiologist.  You do not need to do anything special to prepare.  The MRI machine uses a strong magnet. Please wear clothes without metal (snaps, zippers). A sweatsuit works well, or we may give you a hospital gown.  Please remove any body piercings and hair extensions before you arrive. You will also remove watches, jewelry, hairpins, wallets, dentures, partial dental plates and hearing aids. You may wear contact lenses, and you may be able to wear your rings. We have a safe place to keep your personal items, but it is safer to leave them at home.  **IMPORTANT** THE INSTRUCTIONS BELOW ARE ONLY FOR THOSE PATIENTS WHO HAVE BEEN PRESCRIBED SEDATION OR GENERAL ANESTHESIA DURING THEIR MRI PROCEDURE:  IF YOUR DOCTOR PRESCRIBED ORAL SEDATION (take medicine to help you relax during your exam):   You must get the medicine from your doctor (oral medication) before you arrive. Bring the medicine to the exam. Do not take it at home. You ll be told when to take it upon arriving for your exam.    Arrive one hour early. Bring someone who can take you home after the test. Your medicine will make you sleepy. After the exam, you may not drive, take a bus or take a taxi by yourself.  IF YOUR DOCTOR PRESCRIBED IV SEDATION:   Arrive one hour early. Bring someone who can take you home after the test. Your medicine will make you sleepy. After the exam, you may not drive, take a bus or take a taxi by yourself.   No eating 6 hours before your exam. You may have clear liquids up until 4 hours before your exam. (Clear liquids include water, clear tea, black coffee and fruit juice without pulp.)  IF YOUR DOCTOR PRESCRIBED ANESTHESIA (be asleep for your exam):   Arrive 1 1/2 hours early. Bring someone who can take you home after the test. You may not drive, take a bus or take a taxi by yourself.   No eating 8 hours before your exam. You may have clear liquids up until 4 hours before your exam. (Clear liquids include water, clear tea, black coffee and fruit juice without pulp.)   You will spend four to five hours in the recovery room.  Please call the Imaging Department at your exam site with any questions.            Mar 21, 2018  8:00 AM CDT   New Patient Visit with Nicholas Kay, PhD [facility] ([facility])    [address], [address]  [facility] [id number] [phone]              Who to contact     Please call your clinic at [phone] to:    Ask questions about your health    Make or cancel appointments    Discuss your medicines    Learn about your test results    Speak to your doctor            Additional Information About Your Visit        [employer] Information     [employer] is an electronic gateway that provides easy, online access to your medical records. With [employer], you can request a clinic appointment, read your test results, renew a prescription or communicate with your care team.     To sign up for [employer] visit the website at [email or web address]   You  will be asked to enter the access code listed below, as well as some personal information. Please follow the directions to create your username and password.     Your access code is: [email or web address]  Expires: 3/16/2018 11:28 PM     Your access code will  in 90 days. If you need help or a new code, please contact your [facility] or call [phone] for assistance.        Care EveryWhere ID     This is your Care EveryWhere ID. This could be used by other organizations to access your [facility] medical records  [id number]         Blood Pressure from Last 3 Encounters:   18 121/82   18 107/68   18 120/80    Weight from Last 3 Encounters:   18 132 lb 8 oz (60.1 kg)   18 135 lb 12.8 oz (61.6 kg)   18 134 lb 6.4 oz (61 kg)              We Performed the Following     CHARGE: Video EEG  < 12 hours (86629-01)     EEG video monitoring        Primary Care Provider Office Phone # Fax #    Chaisherita Fausto Dotson [phone] [address] [facility] [address]        Equal Access to Services     RADHA RESENDEZ : Hadii dick ku hadasho Soomaali, waaxda luqadaha, qaybta kaalmada adeegyada, shana pichardo. So [facility] [phone].    ATENCIÓN: Si habla español, tiene a hairston disposición servicios gratuitos de asistencia lingüística. Llame al [phone].    We comply with applicable federal civil rights laws and [place] laws. We do not discriminate on the basis of race, color, national origin, age, disability, sex, sexual orientation, or gender identity.            Thank you!     Thank you for choosing [facility]  for your care. Our goal is always to provide you with excellent care. Hearing back from our patients is one way we can continue to improve our services. Please take a few minutes to complete the written survey that you may receive in the mail after your visit with us. Thank you!             Your  Updated Medication List - Protect others around you: Learn how to safely use, store and throw away your medicines at www.disposemymeds.org.          This list is accurate as of 3/12/18  4:14 PM.  Always use your most recent med list.                   Brand Name Dispense Instructions for use Diagnosis    folic acid 1 MG tablet    FOLVITE    60 tablet    Take 1 tablet (1 mg) by mouth daily    Polysubstance dependence including opioid type drug with complication, continuous use (H)       levETIRAcetam 750 MG tablet    KEPPRA    120 tablet    Take 2 tablets (1,500 mg) by mouth 2 times daily    Status epilepticus, generalized convulsive (H)       melatonin 3 MG tablet     60 tablet    1-2 tablets (3-6 mg) by Oral or Feeding Tube route At Bedtime    Persistent disorder of initiating or maintaining sleep       multivitamin, therapeutic Tabs tablet     60 tablet    Take 1 tablet by mouth daily    Polysubstance dependence including opioid type drug with complication, continuous use (H)       thiamine 100 MG tablet     60 tablet    Take 1 tablet (100 mg) by mouth daily    Polysubstance dependence including opioid type drug with complication, continuous use (H)

## 2018-03-14 NOTE — PROCEDURES
[facility] EEG #[id number] (Out-Patient Video-EEG Monitoring)    Name:     Bobby Howard   MRN: [id number]   : 1990   Procedure Date: 2018   Duration of Recording:  3 hours, 2 minutes.      CLINICAL SUMMARY:  This diagnostic video-EEG monitoring procedure is performed in evaluation of seizures in Bobby Howard.  She was reported to be receiving levetiracetam at the time of this recording.      TECHNICAL SUMMARY:  This continuous EEG monitoring procedure was performed with 23 scalp electrodes in 10-20 system placements, and additional scalp, precordial and other surface electrodes used for electrical referencing and artifact detection.  A single channel of EKG was recorded for purposes of analyzing EKG artifacts in the EEG channels.  Video monitoring was utilized and periodically reviewed by EEG technologist and the physician for electroclinical correlation.    INTERICTAL EEG ACTIVITIES:  During waking there was a 10 Hz posterior dominant rhythm.  During waking there were occasional generalized 4-8 Hz theta activities intermixed symmetrically.  During waking and drowsiness, there were very frequent left frontotemporal 2-8 Hz delta-theta transients.  Drowsiness was manifested by predominance of centrally maximum semirhythmic theta slowing and dropout of the posterior dominant rhythm during deeper drowsiness.  Symmetric sleep spindles were seen during stage II sleep.  There was symmetric bilateral driving in response to photic stimulation.  Hyperventilation did not induce any definite response.   No interictal epileptiform abnormalities were recorded.    ICTAL RECORDINGS:  No electrographic seizures and no paroxysmal behavioral events occurred during this procedure.      SUMMARY OF VIDEO-EEG MONITORING:    The interictal EEG recording was abnormal during waking, drowsiness and stage II sleep due to occasional generalized theta slowing during waking with very frequent left frontotemporal theta-delta  slowing during waking and drowsiness.  No interictal epileptiform abnormalities, no electrographic seizures and no paroxysmal behavioral events were recorded during the period of monitoring.    These abnormalities indicate mild generalized cerebral dysfunction with additional left frontotemporal neuronal dysfunction.  Clinical correlation is recommended.   Rooseevlt Díaz M.D., Professor of Neurology     D: 2018   T: 2018   MT: PREM      Name:     NAEEM OLIVARES   MRN:      [id number]        Account:        [id number]   :      1990           Procedure Date: 2018      Document: [id number]

## 2018-03-19 ENCOUNTER — RADIANT APPOINTMENT (OUTPATIENT)
Dept: MRI IMAGING | Facility: CLINIC | Age: 28
End: 2018-03-19
Attending: PSYCHIATRY & NEUROLOGY
Payer: COMMERCIAL

## 2018-03-19 DIAGNOSIS — G40.901 STATUS EPILEPTICUS, GENERALIZED CONVULSIVE (H): ICD-10-CM

## 2018-03-19 RX ORDER — GADOBUTROL 604.72 MG/ML
7.5 INJECTION INTRAVENOUS ONCE
Status: COMPLETED | OUTPATIENT
Start: 2018-03-19 | End: 2018-03-19

## 2018-03-19 RX ADMIN — GADOBUTROL 6 ML: 604.72 INJECTION INTRAVENOUS at 18:13

## 2018-03-19 NOTE — LETTER
March 26, 2018      Bobby Howard  [address] [place] [address]        Dear ,    We are writing to inform you of your test results.      Your most recent brain MRI showed a small increase in lesions that were present in the earlier MRI.  We already have contacted you to schedule an appt with a specialist in this type of lesion.        Resulted Orders   MR Brain w/o & w Contrast    Narrative    Brain MRI without and with contrast    Provided History:  ; Status epilepticus, generalized convulsive (H).  ICD-10: Status epilepticus, generalized convulsive (H)    Comparison:  12/17/2017      Technique: Sagittal T1-weighted, axial diffusion, susceptibility,  FLAIR, and oblique coronal FLAIR and T2-weighted images obtained  without intravenous contrast. Following gadolinium-based intravenous  contrast administration, axial and coronal T1-weighted images were  obtained.    Contrast: 6ml gadavist    Findings: Multiple foci of T2 white matter signal are again noted,  most of which are slightly diminished in size and intensity. The  associated enhancement seen with several of these lesions has faded,  but faintly persists. There is a new T2 hyperintense lesion in the  left lateral parietal lobe, without enhancement. Slight volume loss of  the left hippocampus, new since the prior study. Subcortical  susceptibility in the anterior right frontal lobe again noted. No  kassandra hydrocephalus or acute intracranial hemorrhage.      Impression    Impression: Evolution of the previously seen white matter injuries,  with 1 new nonenhancing focus of abnormal white matter signal  intensity in the left parietal lobe. Cause of this abnormal white  matter remains uncertain, with toxic and demyelinating processes  considered more likely.    I have personally reviewed the examination and initial interpretation  and I agree with the findings.    DEVON SIM MD       If you have any questions or concerns, please  call the clinic at the number listed above.       Sincerely,    Roosevelt Díaz M.D.

## 2018-03-19 NOTE — DISCHARGE INSTRUCTIONS
MRI Contrast Discharge Instructions    The IV contrast you received today will pass out of your body in your  urine. This will happen in the next 24 hours. You will not feel this process.  Your urine will not change color.    Drink at least 4 extra glasses of water or juice today (unless your doctor  has restricted your fluids). This reduces the stress on your kidneys.  You may take your regular medicines.    If you are on dialysis: It is best to have dialysis today.    If you have a reaction: Most reactions happen right away. If you have  any new symptoms after leaving the hospital (such as hives or swelling),  call your hospital at the correct number below. Or call your family doctor.  If you have breathing distress or wheezing, call 911.    Special instructions: ***    I have read and understand the above information.    Signature:______________________________________ Date:___________    Staff:__________________________________________ Date:___________     Time:__________    [place] [facility] Departments:    ___[place]: [phone]  ___[facility]: [phone]  ___[place]: [phone] ___[facility]: [phone]  ___[facility]: [phone]  ___[facility]: [phone]  ___[phone]  ___[facility]: [phone]  ___[phone]

## 2018-03-22 ENCOUNTER — TELEPHONE (OUTPATIENT)
Dept: NEUROLOGY | Facility: CLINIC | Age: 28
End: 2018-03-22

## 2018-03-22 NOTE — TELEPHONE ENCOUNTER
Nurse received In-Basket message as follows:    Jimmy Bonilla,     Pt's manager at [place] at [facility] called asking if she can schedule with Dr Díaz for migraines. Pt was discharged from [facility] 10 days ago and what they prescribed her is not helping. Elli is wondering if we should schedule a f/u with Arjun? I told her he may not be of help if for migraines and that she may want to see a migraine specialist. Can you call Elli and discuss? [phone].       Nurse returned call to Elli who indicates that Patient was seen in ED for c/o HAs. She received Fioricet wjich she took for 10-15 days and this is now gone, and c/o migraines continue - she is asking about a return visit with Dr. Díaz for this complaint.  This nurse indicated that Dr. Díaz was not likely to see her for Migraines, but that we have a Migraine specialist that Dr. Díaz might referrer to.  Elli is in agreement with this plan.    Nurse will discuss with Dr. Díaz.    Spoke to dr. Díaz about referral to Migraine clinic, and he is agreeable to this and will write the referral.  He also notes that Patient's MRI indicated White Matter abnormalities and will be referring to MS clinic also.  Nurse made return call to Elli to inform her of the referrals and to ask her to inform Bobby of these referrals And / Or call for any questions.

## 2018-03-26 DIAGNOSIS — R41.3 MEMORY LOSS: ICD-10-CM

## 2018-03-26 DIAGNOSIS — G43.719 INTRACTABLE CHRONIC MIGRAINE WITHOUT AURA AND WITHOUT STATUS MIGRAINOSUS: ICD-10-CM

## 2018-03-26 DIAGNOSIS — G40.901 STATUS EPILEPTICUS, GENERALIZED CONVULSIVE (H): Primary | ICD-10-CM

## 2018-03-26 DIAGNOSIS — G93.49 LEUKOENCEPHALOPATHY: ICD-10-CM

## 2018-03-30 ENCOUNTER — TELEPHONE (OUTPATIENT)
Dept: NEUROLOGY | Facility: CLINIC | Age: 28
End: 2018-03-30

## 2018-04-12 NOTE — PROGRESS NOTES
"Date of Onset: 9/2017  Date of Diagnosis: 12/2017  Initial Clinical Course: Monophasic  Current Clinical Course: Primary Progressive  Past Disease Modifying Therapy(ies): NA  Current Disease Modifying Therapy(ies): NA  Most Recent MRI of the Brain: 3/19/18  Most Recent MRI of the Cervical Cord NA  Most Recent MRI of the Thoracic Cord: NA  Most Recent Lumbar Puncture: 12/17/17  Most Recent OCT: NA  Most Recent JCV: NA  Most Recent Remote Hepatitis Panel: NA  Most Recent VZV IgG: NA   Most Recent TB Quant: NA        Prior to presenting to the [facility], the patient did not have any neurologic issues. However, she did have a long significant history of substance abuse and was on a methamphetamine \"binge\" in early 2017. However, she reportedly became sober. Roughly start in September of 2017, the patient started to developed a decline in her intelligence and mood. Roughly in November, the patient started to developing staring spells On 12/16, the patient a generalized tonic clonic seizure. The patient was transferred to the outside hospital where she had three more seizure. At the outside hospital, she was loaded with Keppra and Versed and intubation was attempted, but failed. She did have a fourth seizure at the outside hospital and was transferred to [facility] for further care. While en route to [facility], she had a fifth seizure which prompted paralyzation and subsequent intubation. Per chart review it appears she was given boluses of propofol at [facility] and transferred here for continuous video EEG monitoring. On arrival to [facility] she maintained on propofol infusion while video EEG was obtained. She was started on and maintained on Keppra. Video EEG ran for 6 days, which did intermittently show some right temporal sharps that ultimately were determined to not be epileptic. She had no discrete seizures and her EEG progressively improved. 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 Lumbar puncture was performed, and CSF was negative for infectious etiologies including Neisseria meningitidis, H influenza, mycoplasma, cryptococcus, VZV, HSV, JCV, [place], enterovirus. HIV negative. Oligoclonal bands were elevated in the CSF, consistent with breakdown of the blood brain barrier secondary to  toxic leukoencephalopathy.

## 2018-04-17 ENCOUNTER — OFFICE VISIT (OUTPATIENT)
Dept: NEUROLOGY | Facility: CLINIC | Age: 28
End: 2018-04-17
Attending: PSYCHIATRY & NEUROLOGY
Payer: COMMERCIAL

## 2018-04-17 ENCOUNTER — APPOINTMENT (OUTPATIENT)
Dept: LAB | Facility: CLINIC | Age: 28
End: 2018-04-17
Payer: COMMERCIAL

## 2018-04-17 ENCOUNTER — TELEPHONE (OUTPATIENT)
Dept: OPHTHALMOLOGY | Facility: CLINIC | Age: 28
End: 2018-04-17

## 2018-04-17 VITALS
BODY MASS INDEX: 25.12 KG/M2 | HEIGHT: 63 IN | DIASTOLIC BLOOD PRESSURE: 66 MMHG | HEART RATE: 89 BPM | WEIGHT: 141.8 LBS | SYSTOLIC BLOOD PRESSURE: 122 MMHG

## 2018-04-17 DIAGNOSIS — G93.49 LEUKOENCEPHALOPATHY: ICD-10-CM

## 2018-04-17 DIAGNOSIS — H47.9 UNSPECIFIED DISORDER OF VISUAL PATHWAYS: Primary | ICD-10-CM

## 2018-04-17 LAB
ALBUMIN SERPL-MCNC: 4.2 G/DL (ref 3.4–5)
ALP SERPL-CCNC: 86 U/L (ref 40–150)
ALT SERPL W P-5'-P-CCNC: 16 U/L (ref 0–50)
ANION GAP SERPL CALCULATED.3IONS-SCNC: 7 MMOL/L (ref 3–14)
AST SERPL W P-5'-P-CCNC: 10 U/L (ref 0–45)
B-HCG SERPL-ACNC: <1 IU/L (ref 0–5)
BASOPHILS # BLD AUTO: 0.1 10E9/L (ref 0–0.2)
BASOPHILS NFR BLD AUTO: 0.6 %
BILIRUB SERPL-MCNC: 0.3 MG/DL (ref 0.2–1.3)
BUN SERPL-MCNC: 11 MG/DL (ref 7–30)
CALCIUM SERPL-MCNC: 9.2 MG/DL (ref 8.5–10.1)
CHLORIDE SERPL-SCNC: 108 MMOL/L (ref 94–109)
CO2 SERPL-SCNC: 25 MMOL/L (ref 20–32)
CREAT SERPL-MCNC: 0.62 MG/DL (ref 0.52–1.04)
DEPRECATED CALCIDIOL+CALCIFEROL SERPL-MC: 33 UG/L (ref 20–75)
DIFFERENTIAL METHOD BLD: NORMAL
EOSINOPHIL # BLD AUTO: 0.1 10E9/L (ref 0–0.7)
EOSINOPHIL NFR BLD AUTO: 1 %
ERYTHROCYTE [DISTWIDTH] IN BLOOD BY AUTOMATED COUNT: 11.5 % (ref 10–15)
ERYTHROCYTE [SEDIMENTATION RATE] IN BLOOD BY WESTERGREN METHOD: 8 MM/H (ref 0–20)
GFR SERPL CREATININE-BSD FRML MDRD: >90 ML/MIN/1.7M2
GLUCOSE SERPL-MCNC: 94 MG/DL (ref 70–99)
HBV CORE AB SERPL QL IA: NONREACTIVE
HBV SURFACE AB SERPL IA-ACNC: 0.19 M[IU]/ML
HBV SURFACE AG SERPL QL IA: NONREACTIVE
HCT VFR BLD AUTO: 37.2 % (ref 35–47)
HGB BLD-MCNC: 13.1 G/DL (ref 11.7–15.7)
HIV 1+2 AB+HIV1 P24 AG SERPL QL IA: NONREACTIVE
IMM GRANULOCYTES # BLD: 0.1 10E9/L (ref 0–0.4)
IMM GRANULOCYTES NFR BLD: 0.5 %
LYMPHOCYTES # BLD AUTO: 3.3 10E9/L (ref 0.8–5.3)
LYMPHOCYTES NFR BLD AUTO: 31 %
MCH RBC QN AUTO: 32.9 PG (ref 26.5–33)
MCHC RBC AUTO-ENTMCNC: 35.2 G/DL (ref 31.5–36.5)
MCV RBC AUTO: 94 FL (ref 78–100)
MISCELLANEOUS TEST: NORMAL
MONOCYTES # BLD AUTO: 0.5 10E9/L (ref 0–1.3)
MONOCYTES NFR BLD AUTO: 4.6 %
NEUTROPHILS # BLD AUTO: 6.6 10E9/L (ref 1.6–8.3)
NEUTROPHILS NFR BLD AUTO: 62.3 %
NRBC # BLD AUTO: 0 10*3/UL
NRBC BLD AUTO-RTO: 0 /100
PLATELET # BLD AUTO: 249 10E9/L (ref 150–450)
POTASSIUM SERPL-SCNC: 3.6 MMOL/L (ref 3.4–5.3)
PROT SERPL-MCNC: 7.4 G/DL (ref 6.8–8.8)
RBC # BLD AUTO: 3.98 10E12/L (ref 3.8–5.2)
SODIUM SERPL-SCNC: 140 MMOL/L (ref 133–144)
TSH SERPL DL<=0.005 MIU/L-ACNC: 1.21 MU/L (ref 0.4–4)
VIT B12 SERPL-MCNC: 504 PG/ML (ref 193–986)
WBC # BLD AUTO: 10.6 10E9/L (ref 4–11)

## 2018-04-17 PROCEDURE — 86235 NUCLEAR ANTIGEN ANTIBODY: CPT | Performed by: PSYCHIATRY & NEUROLOGY

## 2018-04-17 PROCEDURE — 80053 COMPREHEN METABOLIC PANEL: CPT | Performed by: PSYCHIATRY & NEUROLOGY

## 2018-04-17 PROCEDURE — 86039 ANTINUCLEAR ANTIBODIES (ANA): CPT | Performed by: PSYCHIATRY & NEUROLOGY

## 2018-04-17 PROCEDURE — 36415 COLL VENOUS BLD VENIPUNCTURE: CPT | Performed by: PSYCHIATRY & NEUROLOGY

## 2018-04-17 PROCEDURE — 82306 VITAMIN D 25 HYDROXY: CPT | Performed by: PSYCHIATRY & NEUROLOGY

## 2018-04-17 PROCEDURE — 83516 IMMUNOASSAY NONANTIBODY: CPT | Performed by: PSYCHIATRY & NEUROLOGY

## 2018-04-17 PROCEDURE — 83876 ASSAY MYELOPEROXIDASE: CPT | Performed by: PSYCHIATRY & NEUROLOGY

## 2018-04-17 PROCEDURE — 86704 HEP B CORE ANTIBODY TOTAL: CPT | Performed by: PSYCHIATRY & NEUROLOGY

## 2018-04-17 PROCEDURE — 86787 VARICELLA-ZOSTER ANTIBODY: CPT | Performed by: PSYCHIATRY & NEUROLOGY

## 2018-04-17 PROCEDURE — 84207 ASSAY OF VITAMIN B-6: CPT | Performed by: PSYCHIATRY & NEUROLOGY

## 2018-04-17 PROCEDURE — 87389 HIV-1 AG W/HIV-1&-2 AB AG IA: CPT | Performed by: PSYCHIATRY & NEUROLOGY

## 2018-04-17 PROCEDURE — 80307 DRUG TEST PRSMV CHEM ANLYZR: CPT | Performed by: PSYCHIATRY & NEUROLOGY

## 2018-04-17 PROCEDURE — 84999 UNLISTED CHEMISTRY PROCEDURE: CPT | Performed by: PSYCHIATRY & NEUROLOGY

## 2018-04-17 PROCEDURE — 84443 ASSAY THYROID STIM HORMONE: CPT | Performed by: PSYCHIATRY & NEUROLOGY

## 2018-04-17 PROCEDURE — 87522 HEPATITIS C REVRS TRNSCRPJ: CPT | Performed by: PSYCHIATRY & NEUROLOGY

## 2018-04-17 PROCEDURE — 86706 HEP B SURFACE ANTIBODY: CPT | Performed by: PSYCHIATRY & NEUROLOGY

## 2018-04-17 PROCEDURE — 86255 FLUORESCENT ANTIBODY SCREEN: CPT | Performed by: PSYCHIATRY & NEUROLOGY

## 2018-04-17 PROCEDURE — 84702 CHORIONIC GONADOTROPIN TEST: CPT | Performed by: PSYCHIATRY & NEUROLOGY

## 2018-04-17 PROCEDURE — 82607 VITAMIN B-12: CPT | Performed by: PSYCHIATRY & NEUROLOGY

## 2018-04-17 PROCEDURE — 87340 HEPATITIS B SURFACE AG IA: CPT | Performed by: PSYCHIATRY & NEUROLOGY

## 2018-04-17 PROCEDURE — 85025 COMPLETE CBC W/AUTO DIFF WBC: CPT | Performed by: PSYCHIATRY & NEUROLOGY

## 2018-04-17 PROCEDURE — 86480 TB TEST CELL IMMUN MEASURE: CPT | Performed by: PSYCHIATRY & NEUROLOGY

## 2018-04-17 PROCEDURE — 86431 RHEUMATOID FACTOR QUANT: CPT | Performed by: PSYCHIATRY & NEUROLOGY

## 2018-04-17 PROCEDURE — G0463 HOSPITAL OUTPT CLINIC VISIT: HCPCS | Mod: ZF

## 2018-04-17 PROCEDURE — 40000975 ZZHCL STATISTIC JC VIR AB INDEX INHIB: Performed by: PSYCHIATRY & NEUROLOGY

## 2018-04-17 PROCEDURE — 82164 ANGIOTENSIN I ENZYME TEST: CPT | Performed by: PSYCHIATRY & NEUROLOGY

## 2018-04-17 PROCEDURE — 86038 ANTINUCLEAR ANTIBODIES: CPT | Performed by: PSYCHIATRY & NEUROLOGY

## 2018-04-17 PROCEDURE — 85652 RBC SED RATE AUTOMATED: CPT | Performed by: PSYCHIATRY & NEUROLOGY

## 2018-04-17 ASSESSMENT — PAIN SCALES - GENERAL: PAINLEVEL: NO PAIN (0)

## 2018-04-17 NOTE — MR AVS SNAPSHOT
After Visit Summary   4/17/2018    Bobby Howard    MRN: [id number]           Patient Information     Date Of Birth          1990        Visit Information        Provider Department      4/17/2018 11:00 AM Gurinder Edouard MD [facility] Multiple Sclerosis        Today's Diagnoses     Unspecified disorder of visual pathways    -  1    Leukoencephalopathy          Care Instructions    Will get an OCT (EYE Test)    Will get a MRI of the Cervical and Thoracic spine    Will get blood work work    Neuropsych testing     ECHO    You saw a neurology provider today at the [facility].  You may have also met with the MS RN Care Coordinator.  In order to get a message to your MS Center provider, you should contact [phone] option 3 for the triage nurse line.    You should contact us via this protocol if you have any of the following symptoms:    New or worsening neurologic symptoms that persist for 24-48 hours, such as:  o New onset of pain or marked worsening of pain  o Difficulty with speech, swallowing, or breathing  o New onset of vertigo or dizziness  o Change in bowel or bladder function (incontinence, difficulty urinating)    Increasing difficulty in self care    Marked changes in vision (double vision, blurred vision, graying of vision)    Change in mobility    Change in cognitive function    Falling    Worsening numbness, tingling or pain with a change in function    Worsening fatigue lasting more than 2 weeks  If you had labs completed today, we will contact you with the results.  If you are active in [employer], they will be released to you there.  Otherwise, your results will be provided to you via mail or telephone call.  Some results take up to 2 weeks for completion.  If you haven t heard anything about your lab results within 2 weeks, you can call or send a [employer] message to obtain your results.  If you have an MRI scheduled in the week or two  prior to your next appointment, we will go over the results at your scheduled follow up appointment.  If you are not scheduled to see your MS Center provider within about 2 weeks after your MRI, please call or send a [facility] message to obtain your results if you haven t heard anything within 2 weeks.  Please be aware that it takes at least 5 business days after routine MRIs for your results to be reviewed by both the radiologist and your doctor.  MRIs completed at facilities outside of the [place] system take about 2 weeks in order for the MRI disc to be mailed to our clinic and uploaded into your medical record.    Prescription refills should be faxed to us by your pharmacy.  Our fax number for prescription refills is [phone].  Please do your best to come to your appointments, and to arrive 15 minutes early to allow time for checking in.  [facility] reserves the right to terminate care of established patients if a patient misses three or more appointments in a clinic without providing notification within a 12-month period.    Developing Your Care Team  Individuals living with chronic illnesses like MS may be unaware that they are at risk for the same range of medical problems as everyone else.  This is why you must establish a relationship with other health care providers in addition to your Multiple Sclerosis doctor.  It can be difficult at times to figure out whether a health concern is related to your MS, or whether it is related to something else, such as hormonal changes, pseduoexacerbations, changes in your core body temperature, flu-like reactions to interferons, exercise, or infections.  Urinary tract infections (UTIs) are common culprits that can cause fatigue, weakness, or other  MS attack -like symptoms without classic symptoms of a UTI.  For this reason, if you call or come in to discuss symptoms, you may be asked to get in touch with your primary provider or another  specialist, so that you receive the comprehensive care you need.  What is Multiple Sclerosis (MS)?  MS is a disease in which the nerve tissues in the brain and/or spinal cord are attacked by immune cells in the body.  These immune cells are present in everyone, and their normal role is to fight off infections.  In people with MS, these cells change the way they function and cross into the nervous system.  Once there, they cause inflammation that damages the myelin (or the protective coating of a nerve cell, much like the plastic covering on an electrical cord) and parts of the nerve cell itself.  So far, a clear cause for this immune system dysfunction has not been found.  MS often starts out as the  relapsing-remitting  form.  This means there are episodes when you have symptoms, and other times when you recover to normal or near-normal.  Over time, if the damage to the nervous system continues, the disease can cause additional disability, such as difficulty walking.  If the relapses and nerve damage can be prevented with available medications, many patients with MS can go many years between relapses and have relatively little disability.  Remember: MS is a condition that changes and must be evaluated on an ongoing basis!  What is a Relapse? (Also called flare-ups, attacks, or exacerbations)  Relapses are due to the occurrence of inflammation in some part of the brain and/or spinal cord.  A relapse is new or recurrent symptoms which persist for at least 24 hours and sometimes worsen over 48 hours.  New symptoms need to be  by at least 1 month in order to be considered separate relapses. Most of the time, symptoms reach their maximal intensity within 2 weeks and then begin to slowly resolve.  At times, your symptoms may not recover fully for up to 6 months, depending on the severity of the episode.  The frequency of relapses is generally higher early in the disease, but can vary greatly among individuals  with MS.  Improvement of symptoms for an individual is unpredictable with each relapse.    It is important to remember that an increase in symptoms and changes in function may not necessarily be a relapse.  There are other factors that contribute to such changes, such as hot weather, increased body temperature, infection/illness, stress and sleep deprivation.  The worsening of symptoms may feel like a relapse when in reality it is not.  These episodes are referred to as pseudorelapses.  Once the underlying cause is addressed, symptoms usually fade away and you feel better.  If you experience a worsening of symptoms that lasts more than 48 hours and does not improve with cooling down, decreasing stress, or treatment of an infection, please call us and we can help to better determine whether you are having a pseudorelapse versus a relapse.                Follow-ups after your visit        Additional Services     NEUROPSYCHOLOGY REFERRAL       Your provider has referred you to:    [facility] [phone] Preferred Provider: No preferred provider   [email or web address]    All scheduling is subject to the client's specific insurance plan & benefits, provider/location availability, and provider clinical specialities.  Please arrive 15 minutes early for your first appointment and bring your completed paperwork.    Please be aware that coverage of these services is subject to the terms and limitations of your health insurance plan.  Call member services at your health plan with any benefit or coverage questions.    Please bring the following to your appointment:  >>   Any x-rays, CTs or MRIs which have been performed.  Contact the facility where they were done to arrange for  prior to your scheduled appointment.  Any new CT, MRI or other procedures ordered by your specialist must be performed at a [facility] facility or coordinated by your clinic's  referral office.    >>   List of current medications   >>   This referral request   >>   Any documents/labs given to you for this referral                  Follow-up notes from your care team     Return in about 4 weeks (around 5/15/2018).      Your next 10 appointments already scheduled     Apr 17, 2018  1:00 PM CDT   LAB with  LAB   [facility] ([facility])    [address] [place] [id number] [phone]           Please do not eat 10-12 hours before your appointment if you are coming in fasting for labs on lipids, cholesterol, or glucose (sugar). This does not apply to pregnant women. Water, hot tea and black coffee (with nothing added) are okay. Do not drink other fluids, diet soda or chew gum.            Apr 19, 2018  7:00 AM CDT   (Arrive by 6:45 AM)   New Patient Visit with Fausto Pimentel MD   [facility] ([facility])    [address] [place] [id number] [phone]            May 02, 2018 10:30 AM CDT   Ech Terrell with UUETEER1   [facility], [place],  [facility] ([facility])    [address] [facility] [address] [phone]           1.  Please bring or wear a comfortable two-piece outfit. 2.  Arrival time: -   [facility]:  arrive 75 minutes prior to examination time. -   [facility]:  arrive 90 minutes prior to examination time. -   [facility]:   arrive 15 minutes prior to examination time. 3.  Plan to have someone here to drive you home after the test. -   Someone should stay with you for 6 hours after your test. 4.  No food or drink: -   6 hours before the test 5.  If you take antacids or water pills (diuretics): Do not take them until after your test. You may take blood pressure medicine with a few sips of water. 6.  If you have diabetes: -   Morning slots preferred -   If you take insulin, call your diabetes care team. Ask if you  should take a   dose the morning of your test. -   If you take diabetes medicine by mouth, don't take it on the morning of your test. Bring it with you to take after the test. (If you have questions, call your diabetes care team.) 7.  Bring a list of any medicines you are taking. 8.  Do not drive for 24 hours after the test. 9.   A responsible adult must stay with you for 24 hours after the test.  10.  For any questions that cannot be answered, please contact the ordering physician            May 02, 2018  1:00 PM CDT   (Arrive by 12:45 PM)   MR BRAIN W/O & W CONTRAST with UUMR2   [facility], [place], [facility] ([facility])    [address] [facility] [address] [phone]           Take your medicines as usual, unless your doctor tells you not to. Bring a list of your current medicines to your exam (including vitamins, minerals and over-the-counter drugs).  You may or may not receive intravenous (IV) contrast for this exam pending the discretion of the Radiologist.  You do not need to do anything special to prepare.  The MRI machine uses a strong magnet. Please wear clothes without metal (snaps, zippers). A sweatsuit works well, or we may give you a hospital gown.  Please remove any body piercings and hair extensions before you arrive. You will also remove watches, jewelry, hairpins, wallets, dentures, partial dental plates and hearing aids. You may wear contact lenses, and you may be able to wear your rings. We have a safe place to keep your personal items, but it is safer to leave them at home.  **IMPORTANT** THE INSTRUCTIONS BELOW ARE ONLY FOR THOSE PATIENTS WHO HAVE BEEN PRESCRIBED SEDATION OR GENERAL ANESTHESIA DURING THEIR MRI PROCEDURE:  IF YOUR DOCTOR PRESCRIBED ORAL SEDATION (take medicine to help you relax during your exam):   You must get the medicine from your doctor (oral medication) before you arrive. Bring the medicine to the exam. Do not take  it at home. You ll be told when to take it upon arriving for your exam.   Arrive one hour early. Bring someone who can take you home after the test. Your medicine will make you sleepy. After the exam, you may not drive, take a bus or take a taxi by yourself.  IF YOUR DOCTOR PRESCRIBED IV SEDATION:   Arrive one hour early. Bring someone who can take you home after the test. Your medicine will make you sleepy. After the exam, you may not drive, take a bus or take a taxi by yourself.   No eating 6 hours before your exam. You may have clear liquids up until 4 hours before your exam. (Clear liquids include water, clear tea, black coffee and fruit juice without pulp.)  IF YOUR DOCTOR PRESCRIBED ANESTHESIA (be asleep for your exam):   Arrive 1 1/2 hours early. Bring someone who can take you home after the test. You may not drive, take a bus or take a taxi by yourself.   No eating 8 hours before your exam. You may have clear liquids up until 4 hours before your exam. (Clear liquids include water, clear tea, black coffee and fruit juice without pulp.)   You will spend four to five hours in the recovery room.  Please call the Imaging Department at your exam site with any questions.            May 02, 2018  2:00 PM CDT   (Arrive by 1:45 PM)   MR CERVICAL SPINE W/O & W CONTRAST with UUMR2   [facility], [place], MRI ([facility])    [address] [facility] [id number] [phone]           Take your medicines as usual, unless your doctor tells you not to. Bring a list of your current medicines to your exam (including vitamins, minerals and over-the-counter drugs).  You may or may not receive intravenous (IV) contrast for this exam pending the discretion of the Radiologist.  You do not need to do anything special to prepare.  The MRI machine uses a strong magnet. Please wear clothes without metal (snaps, zippers). A sweatsuit works well, or we may give you a hospital  gown.  Please remove any body piercings and hair extensions before you arrive. You will also remove watches, jewelry, hairpins, wallets, dentures, partial dental plates and hearing aids. You may wear contact lenses, and you may be able to wear your rings. We have a safe place to keep your personal items, but it is safer to leave them at home.  **IMPORTANT** THE INSTRUCTIONS BELOW ARE ONLY FOR THOSE PATIENTS WHO HAVE BEEN PRESCRIBED SEDATION OR GENERAL ANESTHESIA DURING THEIR MRI PROCEDURE:  IF YOUR DOCTOR PRESCRIBED ORAL SEDATION (take medicine to help you relax during your exam):   You must get the medicine from your doctor (oral medication) before you arrive. Bring the medicine to the exam. Do not take it at home. You ll be told when to take it upon arriving for your exam.   Arrive one hour early. Bring someone who can take you home after the test. Your medicine will make you sleepy. After the exam, you may not drive, take a bus or take a taxi by yourself.  IF YOUR DOCTOR PRESCRIBED IV SEDATION:   Arrive one hour early. Bring someone who can take you home after the test. Your medicine will make you sleepy. After the exam, you may not drive, take a bus or take a taxi by yourself.   No eating 6 hours before your exam. You may have clear liquids up until 4 hours before your exam. (Clear liquids include water, clear tea, black coffee and fruit juice without pulp.)  IF YOUR DOCTOR PRESCRIBED ANESTHESIA (be asleep for your exam):   Arrive 1 1/2 hours early. Bring someone who can take you home after the test. You may not drive, take a bus or take a taxi by yourself.   No eating 8 hours before your exam. You may have clear liquids up until 4 hours before your exam. (Clear liquids include water, clear tea, black coffee and fruit juice without pulp.)   You will spend four to five hours in the recovery room.  Please call the Imaging Department at your exam site with any questions.            May 02, 2018  3:00 PM CDT    (Arrive by 2:45 PM)   MR THORACIC SPINE W/O & W CONTRAST with UUMR2   [facility], [place], MRI ([facility])    [address] [facility] [address] [phone]           Take your medicines as usual, unless your doctor tells you not to. Bring a list of your current medicines to your exam (including vitamins, minerals and over-the-counter drugs).  You may or may not receive intravenous (IV) contrast for this exam pending the discretion of the Radiologist.  You do not need to do anything special to prepare.  The MRI machine uses a strong magnet. Please wear clothes without metal (snaps, zippers). A sweatsuit works well, or we may give you a hospital gown.  Please remove any body piercings and hair extensions before you arrive. You will also remove watches, jewelry, hairpins, wallets, dentures, partial dental plates and hearing aids. You may wear contact lenses, and you may be able to wear your rings. We have a safe place to keep your personal items, but it is safer to leave them at home.  **IMPORTANT** THE INSTRUCTIONS BELOW ARE ONLY FOR THOSE PATIENTS WHO HAVE BEEN PRESCRIBED SEDATION OR GENERAL ANESTHESIA DURING THEIR MRI PROCEDURE:  IF YOUR DOCTOR PRESCRIBED ORAL SEDATION (take medicine to help you relax during your exam):   You must get the medicine from your doctor (oral medication) before you arrive. Bring the medicine to the exam. Do not take it at home. You ll be told when to take it upon arriving for your exam.   Arrive one hour early. Bring someone who can take you home after the test. Your medicine will make you sleepy. After the exam, you may not drive, take a bus or take a taxi by yourself.  IF YOUR DOCTOR PRESCRIBED IV SEDATION:   Arrive one hour early. Bring someone who can take you home after the test. Your medicine will make you sleepy. After the exam, you may not drive, take a bus or take a taxi by yourself.   No eating 6 hours before  your exam. You may have clear liquids up until 4 hours before your exam. (Clear liquids include water, clear tea, black coffee and fruit juice without pulp.)  IF YOUR DOCTOR PRESCRIBED ANESTHESIA (be asleep for your exam):   Arrive 1 1/2 hours early. Bring someone who can take you home after the test. You may not drive, take a bus or take a taxi by yourself.   No eating 8 hours before your exam. You may have clear liquids up until 4 hours before your exam. (Clear liquids include water, clear tea, black coffee and fruit juice without pulp.)   You will spend four to five hours in the recovery room.  Please call the Imaging Department at your exam site with any questions.            May 16, 2018  5:00 PM CDT   (Arrive by 4:45 PM)   Return Multiple Sclerosis with Gurinder Edouard MD   [facility] ([facility])    [address] [place] [address]  [address] [place] [address] [phone]              Future tests that were ordered for you today     Open Future Orders        Priority Expected Expires Ordered    MR Brain w/o & w Contrast Routine  4/17/2019 4/17/2018    Transesophageal Echocardiogram Routine  4/17/2019 4/17/2018    MR Cervical Spine w/o & w Contrast Routine  4/17/2019 4/17/2018    MRI Thoracic spine w & w/o contrast Routine  4/17/2019 4/17/2018            Who to contact     If you have questions or need follow up information about today's clinic visit or your schedule please contact [facility] directly at [phone].  Normal or non-critical lab and imaging results will be communicated to you by MyChart, letter or phone within 4 business days after the clinic has received the results. If you do not hear from us within 7 days, please contact the clinic through [employer] or phone. If you have a critical or abnormal lab result, we will notify you by phone as soon as possible.  Submit refill requests through [employer] or call your pharmacy and they will  "forward the refill request to us. Please allow 3 business days for your refill to be completed.          Additional Information About Your Visit        [employer] Information     [employer] lets you send messages to your doctor, view your test results, renew your prescriptions, schedule appointments and more. To sign up, go to [email or web address] . Click on \"Log in\" on the left side of the screen, which will take you to the Welcome page. Then click on \"Sign up Now\" on the right side of the page.     You will be asked to enter the access code listed below, as well as some personal information. Please follow the directions to create your username and password.     Your access code is: [email or web address]  Expires: 2018  6:31 AM     Your access code will  in 90 days. If you need help or a new code, please call your [place] clinic or [phone].        Care EveryWhere ID     This is your Care EveryWhere ID. This could be used by other organizations to access your [place] medical records  [id number]        Your Vitals Were     Pulse Height BMI (Body Mass Index)             89 1.588 m (5' 2.5\") 25.52 kg/m2          Blood Pressure from Last 3 Encounters:   18 122/66   18 121/82   18 107/68    Weight from Last 3 Encounters:   18 64.3 kg (141 lb 12.8 oz)   18 60.1 kg (132 lb 8 oz)   18 61.6 kg (135 lb 12.8 oz)              We Performed the Following     Angiotensin converting enzyme     Anti Nuclear Sonal IgG by IFA with Reflex     CBC with platelets differential     Comprehensive metabolic panel     Drug screen comprehensive blood ([facility])     ESR: Erythrocyte sedimentation rate     HCG Quantitative Pregnancy, Blood (ULQ902)     Hepatitis B core antibody     Hepatitis B Surface Antibody     Hepatitis B surface antigen     Hepatitis C RNA quantitative     HIV Antigen Antibody Combo     DINORA Virus Ab Index Reflex Inhibition     M Tuberculosis by Quantiferon     Vega Send out. Test ID: CSI1 " Reporting Name: CNS Demyelinating Disease Interp, S: Laboratory Miscellaneous Order     NEUROPSYCHOLOGY REFERRAL     OCT Optic Nerve RNFL Spectralis OU (both eyes)     OCT Optic Nerve Volume Spectralis OU (both eyes)     OCT Retina Spectralis OU (both eyes)     Paraneoplastic Antibodies with Reflex     Rheumatoid factor     SSA Ro CHERELLE Antibody IgG     SSB La CHERELLE Antibody IgG     Tissue transglutaminase bijan IgA and IgG     TSH with free T4 reflex     Varicella Zoster Virus Antibody IgG     Vasculitis panel     Vitamin B12     Vitamin B6     Vitamin D Deficiency          Today's Medication Changes          These changes are accurate as of 4/17/18 12:46 PM.  If you have any questions, ask your nurse or doctor.               These medicines have changed or have updated prescriptions.        Dose/Directions    melatonin 3 MG tablet   This may have changed:  how to take this   Used for:  Persistent disorder of initiating or maintaining sleep        Dose:  3-6 mg   1-2 tablets (3-6 mg) by Oral or Feeding Tube route At Bedtime   Quantity:  60 tablet   Refills:  3                Primary Care Provider Office Phone # Fax #    Cash Dotson [phone]       [address] [facility] [address]        Equal Access to Services     [facility] AH: Hadii dick ku hadasho Soomaali, waaxda luqadaha, qaybta kaalmada adeegyada, waxay patricein hayonel ruiz . So [facility] [phone].    ATENCIÓN: Si habla español, tiene a hairston disposición servicios gratuitos de asistencia lingüística. Llame al [phone].    We comply with applicable federal civil rights laws and [place] laws. We do not discriminate on the basis of race, color, national origin, age, disability, sex, sexual orientation, or gender identity.            Thank you!     Thank you for choosing [facility]  for your care. Our goal is always to provide you with excellent care. Hearing back from our patients is one way we can  continue to improve our services. Please take a few minutes to complete the written survey that you may receive in the mail after your visit with us. Thank you!             Your Updated Medication List - Protect others around you: Learn how to safely use, store and throw away your medicines at www.disposemymeds.org.          This list is accurate as of 4/17/18 12:46 PM.  Always use your most recent med list.                   Brand Name Dispense Instructions for use Diagnosis    folic acid 1 MG tablet    FOLVITE    60 tablet    Take 1 tablet (1 mg) by mouth daily    Polysubstance dependence including opioid type drug with complication, continuous use (H)       levETIRAcetam 750 MG tablet    KEPPRA    120 tablet    Take 2 tablets (1,500 mg) by mouth 2 times daily    Status epilepticus, generalized convulsive (H)       melatonin 3 MG tablet     60 tablet    1-2 tablets (3-6 mg) by Oral or Feeding Tube route At Bedtime    Persistent disorder of initiating or maintaining sleep       multivitamin, therapeutic Tabs tablet     60 tablet    Take 1 tablet by mouth daily    Polysubstance dependence including opioid type drug with complication, continuous use (H)       thiamine 100 MG tablet     60 tablet    Take 1 tablet (100 mg) by mouth daily    Polysubstance dependence including opioid type drug with complication, continuous use (H)

## 2018-04-17 NOTE — TELEPHONE ENCOUNTER
----- Message from Diallo Baires sent at 4/17/2018 12:44 PM CDT -----  Regarding: Tech appt  Contact: [phone]  Pt is requesting a tech appt for just a OCT test. Please call pt at work number [phone] to set up appt.    Thank you,    Diallo Baires  Call Center    Please DO NOT send this message and/or reply back to sender. Call Center Representatives DO NOT respond to messages.

## 2018-04-17 NOTE — LETTER
"4/17/2018       RE: Bobby Howard  [address]  [place] [address]     Dear Colleague,    Thank you for referring your patient, Bobby Howard, to the [facility] MULTIPLE SCL[facility]OSIS at [facility]. Please see a copy of my visit note below.    THE [facility] MULTIPLE [facility]CLEROSIS CLINIC  NEW PATIENT EVALUATION/CONSULTATION    Referral source:   Arjun [facility]ANNA [address] [facility] [address] / [facility] [id number]      Also followed by:   Cash Lambert Weight  [address]  [facility] [id number]      PRINCIPAL NEUROLOGIC DIAGNOSIS: Toxic Leukoencephalopathy    DISEASE SUMMARY  Date of Onset: 9/2017  Date of Diagnosis: 12/2017  Initial Clinical Course: Monophasic  Current Clinical Course: Primary Progressive  Past Disease Modifying Therapy(ies): NA  Current Disease Modifying Therapy(ies): NA  Most Recent MRI of the Brain: 3/19/18  Most Recent MRI of the Cervical Cord NA  Most Recent MRI of the Thoracic Cord: NA  Most Recent Lumbar Puncture: 12/17/17  Most Recent OCT: NA  Most Recent JCV: NA  Most Recent Remote Hepatitis Panel: NA  Most Recent VZV IgG: NA   Most Recent TB Quant: NA            HISTORY OF ILLNESS:    An opinion on this year old right handed genetic female  was requested by Dr. Roosevelt Díaz for the question of whether the patient has multiple sclerosis. The patient was accompanied by father's girlfriend. Previous records (physician notes, laboratory reports, and radiology reports) and imaging studies were reviewed and summarized. My recommendations will be communicated back to the patient's physician(s) by mail.  Follow-up is expected to be with me.        Prior to presenting to the [facility], the patient did not have any neurologic issues. However, she did have a long significant history of substance abuse and was on a methamphetamine \"binge\" in early 2017. However, she reportedly became sober. Roughly start in September of 2017, " the patient started to developed a decline in her intelligence and mood. Roughly in November, the patient started to developing staring spells On 12/16, the patient a generalized tonic clonic seizure. The patient was transferred to the outside hospital where she had three more seizure. At the outside hospital, she was loaded with Keppra and Versed and intubation was attempted, but failed. She did have a fourth seizure at the outside hospital and was [place] [facility] [place]. While en [place] [facility] she had a fifth seizure which prompted paralyzation and subsequent intubation. Per chart review it appears she was given boluses of propofol [place] [facility] and transferred here for continuous video EEG monitoring. On arrival to [facility] she maintained on propofol infusion while video EEG was obtained. She was started on and maintained on Keppra. Video EEG ran for 6 days, which did intermittently show some right temporal sharps that ultimately were determined to not be epileptic. She had no discrete seizures and her EEG progressively improved. 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 Lumbar puncture was performed, and CSF was negative for infectious etiologies including Neisseria meningitidis, H influenza, mycoplasma, cryptococcus, VZV, HSV, JCV, [place], enterovirus. HIV negative. Oligoclonal bands were elevated in the CSF, consistent with breakdown of the blood brain barrier secondary to  toxic leukoencephalopathy. The patient report that she had a repeat MRI of      Current Symptoms:  1. Headache: The patient was unable to give detail  2. Memory: The patient has issues with remember having conversation. The patient just does not seem to pay attention to others. Sometimes she acts like she is 27 and then sometimes she acts like a child. The pateint  seems to only focus on one thing and tunes everything out. This is worse if she is in a bad mood. The patient's father girlgriend thinks that this has been getting better.           PAST HISTORY:  Past Medical History:   Diagnosis Date     Seizures (H)        Past Surgical History:   Procedure Laterality Date      SECTION                Current Outpatient Prescriptions:  Current Outpatient Prescriptions   Medication     melatonin 3 MG tablet     levETIRAcetam (KEPPRA) 750 MG tablet     folic acid (FOLVITE) 1 MG tablet     multivitamin, therapeutic (THERA-VIT) TABS tablet     thiamine 100 MG tablet     No current facility-administered medications for this visit.           ALLERGIES     No Known Allergies      Social History    Social History     Social History     Marital status: Single     Spouse name: N/A     Number of children: N/A     Years of education: N/A     Occupational History     Not on file.     Social History Main Topics     Smoking status: Current Every Day Smoker     Packs/day: 1.00     Types: Cigarettes     Smokeless tobacco: Never Used     Alcohol use No     Drug use: No     Sexual activity: Not Currently     Other Topics Concern     Not on file     Social History Narrative         FAMILY HISTORY     No family history on file.      REVIEW OF SYSTEMS:    Comprehensive review of systems otherwise was negative, including constitutional, head and neck, cardiovascular, pulmonary, gastrointestinal, endocrine, urologic, reproductive, rheumatic, hematologic, immunologic, dermatologic, and psychiatric.    Nutritional concerns: None  Driving issues: None   Safety concerns regarding living situations and safety at home: None  Risk of falls: None  Pain: None    PHYSICAL EXAM:    Hair, skin, nails, and joints were normal. Neck was supple without Lhermitte's phenomenon.  There was no percussion tenderness over the spine.     Oriented to:  person, place, time and president, Language:  Intact fluency,  comprehension, reading, writing, naming and repetition , Memory:  Registered 3 out of 3 and 3 out of 3 on delayed recall, Fund of Knowledge:  very poor fund of knowledge, Caclulation:  impaired, Praxis:  intact and Executive function:  impaired The pateint could not understand proverbs. The patient generally only answered questions with one to two word responses. The patient also giggled continuously through the appointment    Visual acuity:  OD 20/20  OS 20/20-1    Correction: without    Visual fields were full to confrontation.   Pupils were 4 mm and briskly reactive OU with a questionable right relative afferent pupillary defect.  Funduscopic examination was normal without disc edema, erythema, or atrophy.  Extraocular movements: Intact without ADDISON  Facial sensation is normal. Normal strength of the muscles of mastication:   Muscles of facial expression were normal  Hearing was normal. Gag reflex and palatal movements were normal. Sternocleidomastoid and trapezius power were normal. Tongue movements were normal. There was no dysarthria.    Motor Examination:   There was no pronator drift.       Motor    Upper      Right Left   Shoulder Abduction 5 5   Elbow Flexion 5 5   Elbow Extension 5 5   Wrist Extension 5 5   Digit Extension 5 5   Digit Flexion 5 5   APB 5 5   Tone 0 0   Lower       Right Left   Hip Flexion 5 5   Knee Extension 5 5   Knee Flexion 5 5   Foot Dorsiflexion 5 5   Foot Plantar Flexion 5 5   EH 5 5   Toe Flexion 5 5   Tone 0 0           Grade Description   0 No increase in muscle tone   1 Slight increase in muscle tone, manifested by a catch and release or by minimal resistance at the end of the range of motion when the affected part(s) is moved in flexion or extension   1+ Slight increase in muscle tone, manifested by a catch, followed by minimal resistance throughout the remainder (less than half) of the ROM   2 More marked increase in muscle tone through most of the ROM, but affected part(s)  easily moved   3 Considerable increase in muscle tone, passive movement difficult   4 Affected part(s) rigid in flexion or extension             Reflexes:     Reflexes       Right  Left   Biceps 2  2   Triceps 2  2   Brachioradialis 2  2   Patellar  1  1   Achilles 1  1   Babinski down  down         Coordination:     Right Left   RRM Normal Normal   DANNY Normal Normal   FTN Normal Normal   RRM Normal Normal   HKS Normal Normal         Sensory examination:    Light touch:  Intact in all extremities, Pin Prick:  Intact in all extremities, Vibration:   Intact in all extremities, Prioperception:  Intact in all extremities and Temperature:  Intact in all extremities      Coordination and Gait        Gait Normal   Right Left   Romberg Normal  Heel Normal Normal   Tandem {Normal  Toe Normal Normal                     REVIEW OF IMAGING STUDIES:    I personally reviewed the following images:    MRI brain 3/19/2017: Multiple T2 intense lesion in the brain with two enhancing lesions. These lesions are enhancing on MRI in December. 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 Díaz with the question of multiple sclerosis given the evolution of her MRI. Her MRI could very easily be consistent with the diagnosis of multiple sclerosis. However, the continued enhancement of lesions argues against the diagnosis of MS. Also the patient lacks a clear history of a typical demyelinating event that would allow for the application of Ackerman criteria. With this said, this could be a very atypical presentation of multiple sclerosis. Consequently, the patient deserves further workup to determine the etiology of what is going on. To better characterize this illness, I will get a MRI of her cervical and thoracic spine. If lesions are present in the spinal  cord, then that greatly increases the likelihood that this represents multiple sclerosis. I will also get an MS protocol MRI  Of the brain when she gets the mri of her spine  to monitor the continuing evolution of her lesions. I will also check screening bloodwork to evaluate for potential imitators of multiple sclerosis. Incidentally, I will check for MMO and mog as they can rarely present similar to this with tumefactive  lesions. I will get an OCT to evaluate for retinal fiber nerve layer thinning. I will get echocardiogram to evaluate for the potential but unlikely possibility of embolic stroke from endocarditis given her history of substance abuse. I will also get formal neuropsychiatric testing to evaluate the extent of her intellectual impairment. I will follow the patient up in one month.    PLAN:    Get OCT  Get ECHO  GET MRI brain, C and T spine  CMP, CBC +diff, VZV IgG, JCV+index, remote hepatitis panel, TB quant, Vitamin D, Vitamin b12, folate, TSH, and copper     Finally I will follow the patient up in 4 week(s) as long as the patient is doing well. I instructed the patient to call or mychart my office with any concerns or questions.    (Chart documentation was completed in part with Dragon voice-recognition software. Even though reviewed, some grammatical, spelling, and word errors may remain.)       Again, thank you for allowing me to participate in the care of your patient.      Sincerely,    Gurinder Edouard MD

## 2018-04-17 NOTE — TELEPHONE ENCOUNTER
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 Edouard referring for OCT only imaging-- Dr. Edouard placed results    Left message with pt-- provided direct triage number for further assistance  Nasim Dougherty RN 1:06 PM 04/17/18

## 2018-04-17 NOTE — PROGRESS NOTES
"THE [facility] MULTIPLE SCLEROSIS CLINIC  NEW PATIENT EVALUATION/CONSULTATION    Referral source:   Ar[address]  / [facility] [id number]      Also followed by:   Cash Lambert Weight  [address]  [facility] [address]      PRINCIPAL NEUROLOGIC DIAGNOSIS: Toxic Leukoencephalopathy    DISEASE SUMMARY  Date of Onset: 9/2017  Date of Diagnosis: 12/2017  Initial Clinical Course: Monophasic  Current Clinical Course: Primary Progressive  Past Disease Modifying Therapy(ies): NA  Current Disease Modifying Therapy(ies): NA  Most Recent MRI of the Brain: 3/19/18  Most Recent MRI of the Cervical Cord NA  Most Recent MRI of the Thoracic Cord: NA  Most Recent Lumbar Puncture: 12/17/17  Most Recent OCT: NA  Most Recent JCV: NA  Most Recent Remote Hepatitis Panel: NA  Most Recent VZV IgG: NA   Most Recent TB Quant: NA            HISTORY OF ILLNESS:    An opinion on this year old right handed genetic female  was requested by Dr. Roosevelt Díaz for the question of whether the patient has multiple sclerosis. The patient was accompanied by father's girlfriend. Previous records (physician notes, laboratory reports, and radiology reports) and imaging studies were reviewed and summarized. My recommendations will be communicated back to the patient's physician(s) by mail.  Follow-up is expected to be with me.        Prior to presenting to the [facility], the patient did not have any neurologic issues. However, she did have a long significant history of substance abuse and was on a methamphetamine \"binge\" in early 2017. However, she reportedly became sober. Roughly start in September of 2017, the patient started to developed a decline in her intelligence and mood. Roughly in November, the patient started to developing staring spells On 12/16, the patient a generalized tonic clonic seizure. The patient was transferred to the outside hospital where she had three more seizure. At the " outside hospital, she was loaded with Keppra and Versed and intubation was attempted, but failed. She did have a fourth seizure at the outside hospital and was transferred to [facility] for further care. While en route to [facility], she had a fifth seizure which prompted paralyzation and subsequent intubation. Per chart review it appears she was given boluses of propofol at [facility] and transferred here for continuous video EEG monitoring. On arrival to [facility] she maintained on propofol infusion while video EEG was obtained. She was started on and maintained on Keppra. Video EEG ran for 6 days, which did intermittently show some right temporal sharps that ultimately were determined to not be epileptic. She had no discrete seizures and her EEG progressively improved. 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 Lumbar puncture was performed, and CSF was negative for infectious etiologies including Neisseria meningitidis, H influenza, mycoplasma, cryptococcus, VZV, HSV, JCV, [place], enterovirus. HIV negative. Oligoclonal bands were elevated in the CSF, consistent with breakdown of the blood brain barrier secondary to  toxic leukoencephalopathy. The patient report that she had a repeat MRI of      Current Symptoms:  1. Headache: The patient was unable to give detail  2. Memory: The patient has issues with remember having conversation. The patient just does not seem to pay attention to others. Sometimes she acts like she is 27 and then sometimes she acts like a child. The pateint seems to only focus on one thing and tunes everything out. This is worse if she is in a bad mood. The patient's father girlgriend thinks that this has been getting better.           PAST HISTORY:  Past Medical History:   Diagnosis Date     Seizures (H)        Past Surgical History:   Procedure  Laterality Date      SECTION                Current Outpatient Prescriptions:  Current Outpatient Prescriptions   Medication     melatonin 3 MG tablet     levETIRAcetam (KEPPRA) 750 MG tablet     folic acid (FOLVITE) 1 MG tablet     multivitamin, therapeutic (THERA-VIT) TABS tablet     thiamine 100 MG tablet     No current facility-administered medications for this visit.           ALLERGIES     No Known Allergies      Social History    Social History     Social History     Marital status: Single     Spouse name: N/A     Number of children: N/A     Years of education: N/A     Occupational History     Not on file.     Social History Main Topics     Smoking status: Current Every Day Smoker     Packs/day: 1.00     Types: Cigarettes     Smokeless tobacco: Never Used     Alcohol use No     Drug use: No     Sexual activity: Not Currently     Other Topics Concern     Not on file     Social History Narrative         FAMILY HISTORY     No family history on file.      REVIEW OF SYSTEMS:    Comprehensive review of systems otherwise was negative, including constitutional, head and neck, cardiovascular, pulmonary, gastrointestinal, endocrine, urologic, reproductive, rheumatic, hematologic, immunologic, dermatologic, and psychiatric.    Nutritional concerns: None  Driving issues: None   Safety concerns regarding living situations and safety at home: None  Risk of falls: None  Pain: None    PHYSICAL EXAM:    Hair, skin, nails, and joints were normal. Neck was supple without Lhermitte's phenomenon.  There was no percussion tenderness over the spine.     Oriented to:  person, place, time and president, Language:  Intact fluency, comprehension, reading, writing, naming and repetition , Memory:  Registered 3 out of 3 and 3 out of 3 on delayed recall, Fund of Knowledge:  very poor fund of knowledge, Caclulation:  impaired, Praxis:  intact and Executive function:  impaired The pateint could not understand proverbs. The patient  generally only answered questions with one to two word responses. The patient also giggled continuously through the appointment    Visual acuity:  OD 20/20  OS 20/20-1    Correction: without    Visual fields were full to confrontation.   Pupils were 4 mm and briskly reactive OU with a questionable right relative afferent pupillary defect.  Funduscopic examination was normal without disc edema, erythema, or atrophy.  Extraocular movements: Intact without ADDISON  Facial sensation is normal. Normal strength of the muscles of mastication:   Muscles of facial expression were normal  Hearing was normal. Gag reflex and palatal movements were normal. Sternocleidomastoid and trapezius power were normal. Tongue movements were normal. There was no dysarthria.    Motor Examination:   There was no pronator drift.       Motor    Upper      Right Left   Shoulder Abduction 5 5   Elbow Flexion 5 5   Elbow Extension 5 5   Wrist Extension 5 5   Digit Extension 5 5   Digit Flexion 5 5   APB 5 5   Tone 0 0   Lower       Right Left   Hip Flexion 5 5   Knee Extension 5 5   Knee Flexion 5 5   Foot Dorsiflexion 5 5   Foot Plantar Flexion 5 5   EH 5 5   Toe Flexion 5 5   Tone 0 0           Grade Description   0 No increase in muscle tone   1 Slight increase in muscle tone, manifested by a catch and release or by minimal resistance at the end of the range of motion when the affected part(s) is moved in flexion or extension   1+ Slight increase in muscle tone, manifested by a catch, followed by minimal resistance throughout the remainder (less than half) of the ROM   2 More marked increase in muscle tone through most of the ROM, but affected part(s) easily moved   3 Considerable increase in muscle tone, passive movement difficult   4 Affected part(s) rigid in flexion or extension             Reflexes:     Reflexes       Right  Left   Biceps 2  2   Triceps 2  2   Brachioradialis 2  2   Patellar  1  1   Achilles 1  1   Babinski down  down          Coordination:     Right Left   RRM Normal Normal   DANNY Normal Normal   FTN Normal Normal   RRM Normal Normal   HKS Normal Normal         Sensory examination:    Light touch:  Intact in all extremities, Pin Prick:  Intact in all extremities, Vibration:   Intact in all extremities, Prioperception:  Intact in all extremities and Temperature:  Intact in all extremities      Coordination and Gait        Gait Normal   Right Left   Romberg Normal  Heel Normal Normal   Tandem {Normal  Toe Normal Normal                     REVIEW OF IMAGING STUDIES:    I personally reviewed the following images:    MRI brain 3/19/2017: Multiple T2 intense lesion in the brain with two enhancing lesions. These lesions are enhancing on MRI in December. 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 Díaz with the question of multiple sclerosis given the evolution of her MRI. Her MRI could very easily be consistent with the diagnosis of multiple sclerosis. However, the continued enhancement of lesions argues against the diagnosis of MS. Also the patient lacks a clear history of a typical demyelinating event that would allow for the application of Ackerman criteria. With this said, this could be a very atypical presentation of multiple sclerosis. Consequently, the patient deserves further workup to determine the etiology of what is going on. To better characterize this illness, I will get a MRI of her cervical and thoracic spine. If lesions are present in the spinal cord, then that greatly increases the likelihood that this represents multiple sclerosis. I will also get an MS protocol MRI  Of the brain when she gets the mri of her spine  to monitor the continuing evolution of her lesions. I will also check screening bloodwork to evaluate for potential  imitators of multiple sclerosis. Incidentally, I will check for MMO and mog as they can rarely present similar to this with tumefactive  lesions. I will get an OCT to evaluate for retinal fiber nerve layer thinning. I will get echocardiogram to evaluate for the potential but unlikely possibility of embolic stroke from endocarditis given her history of substance abuse. I will also get formal neuropsychiatric testing to evaluate the extent of her intellectual impairment. I will follow the patient up in one month.    PLAN:    Get OCT  Get ECHO  GET MRI brain, C and T spine  CMP, CBC +diff, VZV IgG, JCV+index, remote hepatitis panel, TB quant, Vitamin D, Vitamin b12, folate, TSH, and copper         Finally I will follow the patient up in 4 week(s) as long as the patient is doing well. I instructed the patient to call or mychart my office with any concerns or questions.      Gurinder Edouard MD [facility]  Staff Neurologist   04/17/18       (Chart documentation was completed in part with Dragon voice-recognition software. Even though reviewed, some grammatical, spelling, and word errors may remain.)

## 2018-04-17 NOTE — PATIENT INSTRUCTIONS
Will get an OCT (EYE Test)    Will get a MRI of the Cervical and Thoracic spine    Will get blood work work    Neuropsych testing     ECHO    You saw a neurology provider today at the [facility].  You may have also met with the MS RN Care Coordinator.  In order to get a message to your MS Center provider, you should contact [phone] option 3 for the triage nurse line.    You should contact us via this protocol if you have any of the following symptoms:    New or worsening neurologic symptoms that persist for 24-48 hours, such as:  o New onset of pain or marked worsening of pain  o Difficulty with speech, swallowing, or breathing  o New onset of vertigo or dizziness  o Change in bowel or bladder function (incontinence, difficulty urinating)    Increasing difficulty in self care    Marked changes in vision (double vision, blurred vision, graying of vision)    Change in mobility    Change in cognitive function    Falling    Worsening numbness, tingling or pain with a change in function    Worsening fatigue lasting more than 2 weeks  If you had labs completed today, we will contact you with the results.  If you are active in [employer], they will be released to you there.  Otherwise, your results will be provided to you via mail or telephone call.  Some results take up to 2 weeks for completion.  If you haven t heard anything about your lab results within 2 weeks, you can call or send a [employer] message to obtain your results.  If you have an MRI scheduled in the week or two prior to your next appointment, we will go over the results at your scheduled follow up appointment.  If you are not scheduled to see your MS Center provider within about 2 weeks after your MRI, please call or send a [employer] message to obtain your results if you haven t heard anything within 2 weeks.  Please be aware that it takes at least 5 business days after routine MRIs for your results to be reviewed by  both the radiologist and your doctor.  MRIs completed at facilities outside of the [facility] system take about 2 weeks in order for the MRI disc to be mailed to our clinic and uploaded into your medical record.    Prescription refills should be faxed to us by your pharmacy.  Our fax number for prescription refills is [phone].  Please do your best to come to your appointments, and to arrive 15 minutes early to allow time for checking in.  [facility] reserves the right to terminate care of established patients if a patient misses three or more appointments in a clinic without providing notification within a 12-month period.    Developing Your Care Team  Individuals living with chronic illnesses like MS may be unaware that they are at risk for the same range of medical problems as everyone else.  This is why you must establish a relationship with other health care providers in addition to your Multiple Sclerosis doctor.  It can be difficult at times to figure out whether a health concern is related to your MS, or whether it is related to something else, such as hormonal changes, pseduoexacerbations, changes in your core body temperature, flu-like reactions to interferons, exercise, or infections.  Urinary tract infections (UTIs) are common culprits that can cause fatigue, weakness, or other  MS attack -like symptoms without classic symptoms of a UTI.  For this reason, if you call or come in to discuss symptoms, you may be asked to get in touch with your primary provider or another specialist, so that you receive the comprehensive care you need.  What is Multiple Sclerosis (MS)?  MS is a disease in which the nerve tissues in the brain and/or spinal cord are attacked by immune cells in the body.  These immune cells are present in everyone, and their normal role is to fight off infections.  In people with MS, these cells change the way they function and cross into the nervous system.  Once  there, they cause inflammation that damages the myelin (or the protective coating of a nerve cell, much like the plastic covering on an electrical cord) and parts of the nerve cell itself.  So far, a clear cause for this immune system dysfunction has not been found.  MS often starts out as the  relapsing-remitting  form.  This means there are episodes when you have symptoms, and other times when you recover to normal or near-normal.  Over time, if the damage to the nervous system continues, the disease can cause additional disability, such as difficulty walking.  If the relapses and nerve damage can be prevented with available medications, many patients with MS can go many years between relapses and have relatively little disability.  Remember: MS is a condition that changes and must be evaluated on an ongoing basis!  What is a Relapse? (Also called flare-ups, attacks, or exacerbations)  Relapses are due to the occurrence of inflammation in some part of the brain and/or spinal cord.  A relapse is new or recurrent symptoms which persist for at least 24 hours and sometimes worsen over 48 hours.  New symptoms need to be  by at least 1 month in order to be considered separate relapses. Most of the time, symptoms reach their maximal intensity within 2 weeks and then begin to slowly resolve.  At times, your symptoms may not recover fully for up to 6 months, depending on the severity of the episode.  The frequency of relapses is generally higher early in the disease, but can vary greatly among individuals with MS.  Improvement of symptoms for an individual is unpredictable with each relapse.    It is important to remember that an increase in symptoms and changes in function may not necessarily be a relapse.  There are other factors that contribute to such changes, such as hot weather, increased body temperature, infection/illness, stress and sleep deprivation.  The worsening of symptoms may feel like a relapse  when in reality it is not.  These episodes are referred to as pseudorelapses.  Once the underlying cause is addressed, symptoms usually fade away and you feel better.  If you experience a worsening of symptoms that lasts more than 48 hours and does not improve with cooling down, decreasing stress, or treatment of an infection, please call us and we can help to better determine whether you are having a pseudorelapse versus a relapse.

## 2018-04-17 NOTE — NURSING NOTE
"Chief Complaint   Patient presents with     Consult     [facility]- Consult       Initial /66 (BP Location: Left arm, Patient Position: Chair, Cuff Size: Adult Regular)  Pulse 89  Ht 1.588 m (5' 2.5\")  Wt 64.3 kg (141 lb 12.8 oz)  BMI 25.52 kg/m2 Estimated body mass index is 25.52 kg/(m^2) as calculated from the following:    Height as of this encounter: 1.588 m (5' 2.5\").    Weight as of this encounter: 64.3 kg (141 lb 12.8 oz).  Medication Reconciliation: complete   Tory Nichols MA      "

## 2018-04-18 LAB
ACE SERPL-CCNC: 26 U/L (ref 9–67)
ACETAMINOPHEN QUAL: NEGATIVE
AMOBARBITAL QUAL: NEGATIVE
ANA PAT SER IF-IMP: ABNORMAL
ANA SER QL IF: ABNORMAL
ANA TITR SER IF: ABNORMAL {TITER}
BARBITAL QUAL: NEGATIVE
BUTABARBITAL QUAL: NEGATIVE
BUTALBITAL QUAL: NEGATIVE
CAFFEINE QUAL: POSITIVE
CARBAMAZEPINE QUAL: NEGATIVE
CARISOPRODOL QUAL: NEGATIVE
CHLORPROPAMIDE UR-MCNC: NEGATIVE UG/ML
DRUGS SERPL SCN: NEGATIVE
ENA SS-A IGG SER IA-ACNC: 2.6 AI (ref 0–0.9)
ENA SS-B IGG SER IA-ACNC: <0.2 AI (ref 0–0.9)
ETHCLORVYNOL QUAL: NEGATIVE
ETHINAMATE QUAL: NEGATIVE
ETHOSUXIMIDE QUAL: NEGATIVE
ETHOTOIN QUAL: NEGATIVE
GLUTETHIMIDE QUAL: NEGATIVE
IBUPROFEN QUAL: NEGATIVE
MEPHENYTOIN QUAL: NEGATIVE
MEPHOBARBITAL QUAL: NEGATIVE
MEPROBAMATE QUAL: NEGATIVE
METHAQUALONE QUAL: NEGATIVE
METHARBITAL QUAL: NEGATIVE
METHSUXIMIDE QUAL: NEGATIVE
METHYPRYLON QUAL: NEGATIVE
MYELOPEROXIDASE AB SER-ACNC: <0.2 AI (ref 0–0.9)
PARANEOPLASTIC AB SER QL IF: NORMAL
PENTOBARBITAL QUAL: NEGATIVE
PHENACETIN QUAL: NEGATIVE
PHENOBARBITAL QUAL: NEGATIVE
PHENSUXIMIDE QUAL: NEGATIVE
PHENYTOIN QUAL: NEGATIVE
PRIMIDONE QUAL: NEGATIVE
PROTEINASE3 IGG SER-ACNC: <0.2 AI (ref 0–0.9)
RHEUMATOID FACT SER NEPH-ACNC: <20 IU/ML (ref 0–20)
SALICYLATE QUAL: NEGATIVE
SECOBARBITAL QUAL: NEGATIVE
TALBUTAL QUAL: NEGATIVE
THEOPHYLLINE QUAL: NEGATIVE
THIOPENTAL QUAL: NEGATIVE
TYBAMATE QUAL: NEGATIVE
VALPROIC ACID QUAL: NEGATIVE
VZV IGG SER QL IA: 4.5 AI (ref 0–0.8)

## 2018-04-19 LAB
HCV RNA SERPL NAA+PROBE-ACNC: NORMAL [IU]/ML
HCV RNA SERPL NAA+PROBE-LOG IU: NORMAL LOG IU/ML
M TB TUBERC IFN-G BLD QL: NEGATIVE
M TB TUBERC IFN-G/MITOGEN IGNF BLD: 0.01 IU/ML
TTG IGA SER-ACNC: 1 U/ML
TTG IGG SER-ACNC: <1 U/ML

## 2018-04-20 DIAGNOSIS — H47.9 UNSPECIFIED DISORDER OF VISUAL PATHWAYS: Primary | ICD-10-CM

## 2018-04-20 LAB — VIT B6 SERPL-MCNC: 47.8 NMOL/L (ref 20–125)

## 2018-04-26 LAB
LAB SCANNED RESULT: ABNORMAL
RESULT: NORMAL
SEND OUTS MISC TEST CODE: NORMAL
SEND OUTS MISC TEST SPECIMEN: NORMAL
TEST NAME: NORMAL

## 2018-05-02 ENCOUNTER — HOSPITAL ENCOUNTER (OUTPATIENT)
Dept: CARDIOLOGY | Facility: CLINIC | Age: 28
Discharge: HOME | End: 2018-05-02
Attending: PSYCHIATRY & NEUROLOGY | Admitting: PSYCHIATRY & NEUROLOGY
Payer: COMMERCIAL

## 2018-05-02 ENCOUNTER — HOSPITAL ENCOUNTER (OUTPATIENT)
Dept: MRI IMAGING | Facility: CLINIC | Age: 28
End: 2018-05-02
Attending: PSYCHIATRY & NEUROLOGY
Payer: COMMERCIAL

## 2018-05-02 VITALS
HEART RATE: 79 BPM | RESPIRATION RATE: 16 BRPM | DIASTOLIC BLOOD PRESSURE: 68 MMHG | SYSTOLIC BLOOD PRESSURE: 104 MMHG | OXYGEN SATURATION: 95 %

## 2018-05-02 DIAGNOSIS — G93.49 LEUKOENCEPHALOPATHY: ICD-10-CM

## 2018-05-02 DIAGNOSIS — H47.9 UNSPECIFIED DISORDER OF VISUAL PATHWAYS: ICD-10-CM

## 2018-05-02 PROCEDURE — 93320 DOPPLER ECHO COMPLETE: CPT | Mod: 26 | Performed by: INTERNAL MEDICINE

## 2018-05-02 PROCEDURE — 27210995 ZZH RX 272: Performed by: INTERNAL MEDICINE

## 2018-05-02 PROCEDURE — 76376 3D RENDER W/INTRP POSTPROCES: CPT

## 2018-05-02 PROCEDURE — 99153 MOD SED SAME PHYS/QHP EA: CPT

## 2018-05-02 PROCEDURE — 25000128 H RX IP 250 OP 636: Performed by: PSYCHIATRY & NEUROLOGY

## 2018-05-02 PROCEDURE — 93312 ECHO TRANSESOPHAGEAL: CPT | Mod: 26 | Performed by: INTERNAL MEDICINE

## 2018-05-02 PROCEDURE — 72156 MRI NECK SPINE W/O & W/DYE: CPT

## 2018-05-02 PROCEDURE — 99152 MOD SED SAME PHYS/QHP 5/>YRS: CPT

## 2018-05-02 PROCEDURE — A9585 GADOBUTROL INJECTION: HCPCS | Performed by: PSYCHIATRY & NEUROLOGY

## 2018-05-02 PROCEDURE — 93325 DOPPLER ECHO COLOR FLOW MAPG: CPT | Mod: 26 | Performed by: INTERNAL MEDICINE

## 2018-05-02 PROCEDURE — 25000128 H RX IP 250 OP 636: Performed by: INTERNAL MEDICINE

## 2018-05-02 PROCEDURE — 93312 ECHO TRANSESOPHAGEAL: CPT

## 2018-05-02 PROCEDURE — 70553 MRI BRAIN STEM W/O & W/DYE: CPT

## 2018-05-02 PROCEDURE — 72157 MRI CHEST SPINE W/O & W/DYE: CPT

## 2018-05-02 PROCEDURE — 25000125 ZZHC RX 250: Performed by: INTERNAL MEDICINE

## 2018-05-02 RX ORDER — NALOXONE HYDROCHLORIDE 0.4 MG/ML
.1-.4 INJECTION, SOLUTION INTRAMUSCULAR; INTRAVENOUS; SUBCUTANEOUS
Status: DISCONTINUED | OUTPATIENT
Start: 2018-05-02 | End: 2018-05-03 | Stop reason: HOSPADM

## 2018-05-02 RX ORDER — FENTANYL CITRATE 50 UG/ML
25-50 INJECTION, SOLUTION INTRAMUSCULAR; INTRAVENOUS
Status: DISCONTINUED | OUTPATIENT
Start: 2018-05-02 | End: 2018-05-03 | Stop reason: HOSPADM

## 2018-05-02 RX ORDER — LIDOCAINE 40 MG/G
CREAM TOPICAL
Status: DISCONTINUED | OUTPATIENT
Start: 2018-05-02 | End: 2018-05-03 | Stop reason: HOSPADM

## 2018-05-02 RX ORDER — GADOBUTROL 604.72 MG/ML
7.5 INJECTION INTRAVENOUS ONCE
Status: COMPLETED | OUTPATIENT
Start: 2018-05-02 | End: 2018-05-02

## 2018-05-02 RX ORDER — FENTANYL CITRATE 50 UG/ML
50-100 INJECTION, SOLUTION INTRAMUSCULAR; INTRAVENOUS
Status: COMPLETED | OUTPATIENT
Start: 2018-05-02 | End: 2018-05-02

## 2018-05-02 RX ORDER — FLUMAZENIL 0.1 MG/ML
0.2 INJECTION, SOLUTION INTRAVENOUS
Status: DISCONTINUED | OUTPATIENT
Start: 2018-05-02 | End: 2018-05-03 | Stop reason: HOSPADM

## 2018-05-02 RX ORDER — ACYCLOVIR 200 MG/1
3 CAPSULE ORAL ONCE
Status: COMPLETED | OUTPATIENT
Start: 2018-05-02 | End: 2018-05-02

## 2018-05-02 RX ORDER — SODIUM CHLORIDE 9 MG/ML
INJECTION, SOLUTION INTRAVENOUS CONTINUOUS PRN
Status: DISCONTINUED | OUTPATIENT
Start: 2018-05-02 | End: 2018-05-03 | Stop reason: HOSPADM

## 2018-05-02 RX ADMIN — MIDAZOLAM HYDROCHLORIDE 3 MG: 1 INJECTION, SOLUTION INTRAMUSCULAR; INTRAVENOUS at 11:59

## 2018-05-02 RX ADMIN — SODIUM CHLORIDE 6 ML: 9 INJECTION, SOLUTION INTRAMUSCULAR; INTRAVENOUS; SUBCUTANEOUS at 11:55

## 2018-05-02 RX ADMIN — TOPICAL ANESTHETIC 0.5 ML: 200 SPRAY DENTAL; PERIODONTAL at 11:23

## 2018-05-02 RX ADMIN — LIDOCAINE HYDROCHLORIDE 30 ML: 20 SOLUTION ORAL; TOPICAL at 11:19

## 2018-05-02 RX ADMIN — FENTANYL CITRATE 125 MCG: 50 INJECTION INTRAMUSCULAR; INTRAVENOUS at 11:59

## 2018-05-02 RX ADMIN — SODIUM CHLORIDE 50 ML: 9 INJECTION, SOLUTION INTRAVENOUS at 11:55

## 2018-05-02 RX ADMIN — GADOBUTROL 7.5 ML: 604.72 INJECTION INTRAVENOUS at 14:27

## 2018-05-02 NOTE — PROGRESS NOTES
Pt arrived in ECHO department  for scheduled KEYON.   Procedure explained, questions answered and consent signed. Discharge instructions discussed with patient.  Pt's throat sprayed at 1115, therefore pt will not be able to eat or drink until 2 hours after at 1315.  Informed pt of this time and encouraged to start with warm fluids and soft foods.    Pt tolerated procedure well, and was given a total of 125 mcg IV fentanyl and 3 mg IV versed for conscious sedation.  Pt denied throat or chest pain after KEYON complete.   KEYON probe 56 used for procedure.  Pt denied C.P or throat pain after procedure and was D/C home after awake and VSS.  Escorted out to front lobby by staff in w/c to meet pt's ride home.

## 2018-07-23 ENCOUNTER — TELEPHONE (OUTPATIENT)
Dept: NEUROLOGY | Facility: CLINIC | Age: 28
End: 2018-07-23

## 2018-07-23 DIAGNOSIS — G35 MS (MULTIPLE SCLEROSIS) (H): Primary | ICD-10-CM

## 2018-07-23 NOTE — TELEPHONE ENCOUNTER
Hi    She is a werid exception to the rule. I think she has MS, but she also has a most meth abuse white matter disease. Getting these somewhat frequent MRIs is helping determine what is the most likely between the two. So I would get one when she follows up on Wednesday    Thank  Gurinder Edouard MD [facility]  Staff Neurologist   07/23/18

## 2018-07-23 NOTE — TELEPHONE ENCOUNTER
Hi    I reordered a MRI for the brain only.    Thanks    Gurinder Edouard MD [facility]  Staff Neurologist   07/23/18

## 2018-07-23 NOTE — TELEPHONE ENCOUNTER
Dr. Edouard, please see below message from the call center; It looks like she had imagine done in May, so I can't imagine she would need more with her appointment on Wednesday; Any labs that you want done prior to her appointment, or will you order what/if anything that is needed at the time of her appointment with you? Thank you.    Rachel Awad, MS RN Care Coordinator

## 2018-07-23 NOTE — TELEPHONE ENCOUNTER
I will ask our clinic coordinators to call the patient to help arrange for an MRI prior to her appointment with Dr. Edouard.    Rachel Awad, MS RN Care Coordinator

## 2018-07-23 NOTE — TELEPHONE ENCOUNTER
[facility] Call Center    Phone Message    May a detailed message be left on voicemail: yes    Reason for Call: Other: Dr. Edouard wanted pt go get an MRI and lab work, but there are no orders. They would like to get this done at the same time as her appointment on Wednesday. Please give them a call back once the orders have been placed.      Action Taken: Message routed to:  [facility] ([facility] Neurology

## 2018-07-31 ENCOUNTER — HOSPITAL ENCOUNTER (OUTPATIENT)
Dept: MRI IMAGING | Facility: CLINIC | Age: 28
Discharge: HOME | End: 2018-07-31
Attending: PSYCHIATRY & NEUROLOGY | Admitting: PSYCHIATRY & NEUROLOGY
Payer: COMMERCIAL

## 2018-07-31 ENCOUNTER — OFFICE VISIT (OUTPATIENT)
Dept: NEUROLOGY | Facility: CLINIC | Age: 28
End: 2018-07-31
Attending: PSYCHIATRY & NEUROLOGY
Payer: COMMERCIAL

## 2018-07-31 VITALS
WEIGHT: 132.2 LBS | SYSTOLIC BLOOD PRESSURE: 104 MMHG | BODY MASS INDEX: 23.42 KG/M2 | OXYGEN SATURATION: 98 % | HEART RATE: 80 BPM | DIASTOLIC BLOOD PRESSURE: 68 MMHG | HEIGHT: 63 IN

## 2018-07-31 DIAGNOSIS — I77.6 CNS VASCULITIS (H): ICD-10-CM

## 2018-07-31 DIAGNOSIS — G35 MS (MULTIPLE SCLEROSIS) (H): ICD-10-CM

## 2018-07-31 DIAGNOSIS — I77.6 CNS VASCULITIS (H): Primary | ICD-10-CM

## 2018-07-31 LAB
ALBUMIN SERPL-MCNC: 4 G/DL (ref 3.4–5)
ALP SERPL-CCNC: 92 U/L (ref 40–150)
ALT SERPL W P-5'-P-CCNC: 28 U/L (ref 0–50)
ANION GAP SERPL CALCULATED.3IONS-SCNC: 8 MMOL/L (ref 3–14)
AST SERPL W P-5'-P-CCNC: 12 U/L (ref 0–45)
BASOPHILS # BLD AUTO: 0.1 10E9/L (ref 0–0.2)
BASOPHILS NFR BLD AUTO: 0.6 %
BILIRUB SERPL-MCNC: 0.4 MG/DL (ref 0.2–1.3)
BUN SERPL-MCNC: 12 MG/DL (ref 7–30)
CALCIUM SERPL-MCNC: 9.1 MG/DL (ref 8.5–10.1)
CHLORIDE SERPL-SCNC: 106 MMOL/L (ref 94–109)
CO2 SERPL-SCNC: 26 MMOL/L (ref 20–32)
CREAT SERPL-MCNC: 0.69 MG/DL (ref 0.52–1.04)
DEPRECATED CALCIDIOL+CALCIFEROL SERPL-MC: 34 UG/L (ref 20–75)
DIFFERENTIAL METHOD BLD: NORMAL
EOSINOPHIL # BLD AUTO: 0.1 10E9/L (ref 0–0.7)
EOSINOPHIL NFR BLD AUTO: 1.5 %
ERYTHROCYTE [DISTWIDTH] IN BLOOD BY AUTOMATED COUNT: 11.1 % (ref 10–15)
GFR SERPL CREATININE-BSD FRML MDRD: >90 ML/MIN/1.7M2
GLUCOSE SERPL-MCNC: 90 MG/DL (ref 70–99)
HBV CORE AB SERPL QL IA: NONREACTIVE
HBV SURFACE AB SERPL IA-ACNC: 0.2 M[IU]/ML
HBV SURFACE AG SERPL QL IA: NONREACTIVE
HCG SERPL QL: NEGATIVE
HCT VFR BLD AUTO: 40.8 % (ref 35–47)
HGB BLD-MCNC: 14.4 G/DL (ref 11.7–15.7)
HIV 1+2 AB+HIV1 P24 AG SERPL QL IA: NONREACTIVE
IMM GRANULOCYTES # BLD: 0 10E9/L (ref 0–0.4)
IMM GRANULOCYTES NFR BLD: 0.4 %
LYMPHOCYTES # BLD AUTO: 2.8 10E9/L (ref 0.8–5.3)
LYMPHOCYTES NFR BLD AUTO: 36 %
MCH RBC QN AUTO: 32.9 PG (ref 26.5–33)
MCHC RBC AUTO-ENTMCNC: 35.3 G/DL (ref 31.5–36.5)
MCV RBC AUTO: 93 FL (ref 78–100)
MONOCYTES # BLD AUTO: 0.5 10E9/L (ref 0–1.3)
MONOCYTES NFR BLD AUTO: 6 %
NEUTROPHILS # BLD AUTO: 4.4 10E9/L (ref 1.6–8.3)
NEUTROPHILS NFR BLD AUTO: 55.5 %
NRBC # BLD AUTO: 0 10*3/UL
NRBC BLD AUTO-RTO: 0 /100
PLATELET # BLD AUTO: 271 10E9/L (ref 150–450)
POTASSIUM SERPL-SCNC: 3.6 MMOL/L (ref 3.4–5.3)
PROT SERPL-MCNC: 8.1 G/DL (ref 6.8–8.8)
RBC # BLD AUTO: 4.38 10E12/L (ref 3.8–5.2)
SODIUM SERPL-SCNC: 140 MMOL/L (ref 133–144)
VIT B12 SERPL-MCNC: 544 PG/ML (ref 193–986)
WBC # BLD AUTO: 7.8 10E9/L (ref 4–11)

## 2018-07-31 PROCEDURE — 86706 HEP B SURFACE ANTIBODY: CPT | Performed by: PSYCHIATRY & NEUROLOGY

## 2018-07-31 PROCEDURE — 86787 VARICELLA-ZOSTER ANTIBODY: CPT | Performed by: PSYCHIATRY & NEUROLOGY

## 2018-07-31 PROCEDURE — G0463 HOSPITAL OUTPT CLINIC VISIT: HCPCS | Mod: ZF

## 2018-07-31 PROCEDURE — A9585 GADOBUTROL INJECTION: HCPCS | Performed by: STUDENT IN AN ORGANIZED HEALTH CARE EDUCATION/TRAINING PROGRAM

## 2018-07-31 PROCEDURE — 86704 HEP B CORE ANTIBODY TOTAL: CPT | Performed by: PSYCHIATRY & NEUROLOGY

## 2018-07-31 PROCEDURE — 86355 B CELLS TOTAL COUNT: CPT | Performed by: PSYCHIATRY & NEUROLOGY

## 2018-07-31 PROCEDURE — 86780 TREPONEMA PALLIDUM: CPT | Performed by: PSYCHIATRY & NEUROLOGY

## 2018-07-31 PROCEDURE — 85025 COMPLETE CBC W/AUTO DIFF WBC: CPT | Performed by: PSYCHIATRY & NEUROLOGY

## 2018-07-31 PROCEDURE — 86431 RHEUMATOID FACTOR QUANT: CPT | Performed by: PSYCHIATRY & NEUROLOGY

## 2018-07-31 PROCEDURE — 86480 TB TEST CELL IMMUN MEASURE: CPT | Performed by: PSYCHIATRY & NEUROLOGY

## 2018-07-31 PROCEDURE — 87389 HIV-1 AG W/HIV-1&-2 AB AG IA: CPT | Performed by: PSYCHIATRY & NEUROLOGY

## 2018-07-31 PROCEDURE — 82306 VITAMIN D 25 HYDROXY: CPT | Performed by: PSYCHIATRY & NEUROLOGY

## 2018-07-31 PROCEDURE — 87340 HEPATITIS B SURFACE AG IA: CPT | Performed by: PSYCHIATRY & NEUROLOGY

## 2018-07-31 PROCEDURE — 87522 HEPATITIS C REVRS TRNSCRPJ: CPT | Performed by: PSYCHIATRY & NEUROLOGY

## 2018-07-31 PROCEDURE — 84703 CHORIONIC GONADOTROPIN ASSAY: CPT | Performed by: PSYCHIATRY & NEUROLOGY

## 2018-07-31 PROCEDURE — 25000128 H RX IP 250 OP 636: Performed by: STUDENT IN AN ORGANIZED HEALTH CARE EDUCATION/TRAINING PROGRAM

## 2018-07-31 PROCEDURE — 82607 VITAMIN B-12: CPT | Performed by: PSYCHIATRY & NEUROLOGY

## 2018-07-31 PROCEDURE — 80053 COMPREHEN METABOLIC PANEL: CPT | Performed by: PSYCHIATRY & NEUROLOGY

## 2018-07-31 PROCEDURE — 84207 ASSAY OF VITAMIN B-6: CPT | Performed by: PSYCHIATRY & NEUROLOGY

## 2018-07-31 PROCEDURE — 36415 COLL VENOUS BLD VENIPUNCTURE: CPT | Performed by: PSYCHIATRY & NEUROLOGY

## 2018-07-31 PROCEDURE — 70553 MRI BRAIN STEM W/O & W/DYE: CPT

## 2018-07-31 RX ORDER — GADOBUTROL 604.72 MG/ML
7.5 INJECTION INTRAVENOUS ONCE
Status: COMPLETED | OUTPATIENT
Start: 2018-07-31 | End: 2018-07-31

## 2018-07-31 RX ADMIN — GADOBUTROL 6.5 ML: 604.72 INJECTION INTRAVENOUS at 09:15

## 2018-07-31 ASSESSMENT — PAIN SCALES - GENERAL: PAINLEVEL: NO PAIN (0)

## 2018-07-31 NOTE — NURSING NOTE
"Chief Complaint   Patient presents with     RECHECK     [facility] RETURN - MULTIPLE SCLEROSIS       Initial /68 (BP Location: Right arm, Patient Position: Sitting, Cuff Size: Adult Regular)  Pulse 80  Ht 1.588 m (5' 2.5\")  Wt 60 kg (132 lb 3.2 oz)  SpO2 98%  BMI 23.79 kg/m2 Estimated body mass index is 23.79 kg/(m^2) as calculated from the following:    Height as of this encounter: 1.588 m (5' 2.5\").    Weight as of this encounter: 60 kg (132 lb 3.2 oz)..  BP completed using cuff size: regular  Medications Reconciled: Yes  Cynthia Daly CMA  12:49 PM          "

## 2018-07-31 NOTE — PATIENT INSTRUCTIONS
Bloodwork    Refer for neuropsych testing    Follow up in 3 months    Will consider Rituxan      You saw a neurology provider, Gurinder Edouard, today at the [facility].  You may have also met with the MS RN Care Coordinator.  In order to get a message to your MS Center provider, you should contact [phone]. You should contact us via this protocol if you have any of the following symptoms:    New or worsening neurologic symptoms that persist for 24-48 hours, such as:  o New onset of pain or marked worsening of pain  o Difficulty with speech, swallowing, or breathing  o New onset of vertigo or dizziness  o Change in bowel or bladder function (incontinence, difficulty urinating)    Increasing difficulty in self care    Marked changes in vision (double vision, blurred vision, graying of vision)    Change in mobility    Change in cognitive function    Falling    Worsening numbness, tingling or pain with a change in function    Worsening fatigue lasting more than 2 weeks  If you had labs completed today, we will contact you with the results.  If you are active in [employer], they will be released to you there.  Otherwise, your results will be provided to you via mail or telephone call.  Some results take up to 2 weeks for completion.  If you haven t heard anything about your lab results within 2 weeks, you can call or send a [employer] message to obtain your results.  If you have an MRI scheduled in the week or two prior to your next appointment, we will go over the results at your scheduled follow up appointment.  If you are not scheduled to see your MS Center provider within about 2 weeks after your MRI, please call or send a [employer] message to obtain your results if you haven t heard anything within 2 weeks.  Please be aware that it takes at least 5 business days after routine MRIs for your results to be reviewed by both the radiologist and your doctor.  MRIs completed at facilities  outside of the [facility] system take about 2 weeks in order for the MRI disc to be mailed to our clinic and uploaded into your medical record.    Please contact your pharmacy to request refills of your medications.   Please do your best to come to your appointments, and to arrive 15 minutes early to allow time for checking in.  [facility] reserves the right to terminate care of established patients if a patient misses three or more appointments in a clinic without providing notification within a 12-month period.    Developing Your Care Team  Individuals living with chronic illnesses like MS may be unaware that they are at risk for the same range of medical problems as everyone else.  This is why you must establish a relationship with other health care providers in addition to your Multiple Sclerosis doctor.  It can be difficult at times to figure out whether a health concern is related to your MS, or whether it is related to something else, such as hormonal changes, pseduoexacerbations, changes in your core body temperature, flu-like reactions to interferons, exercise, or infections.  Urinary tract infections (UTIs) are common culprits that can cause fatigue, weakness, or other  MS attack -like symptoms without classic symptoms of a UTI.  For this reason, if you call or come in to discuss symptoms, you may be asked to get in touch with your primary provider or another specialist, so that you receive the comprehensive care you need.  What is Multiple Sclerosis (MS)?  MS is a disease in which the nerve tissues in the brain and/or spinal cord are attacked by immune cells in the body.  These immune cells are present in everyone, and their normal role is to fight off infections.  In people with MS, these cells change the way they function and cross into the nervous system.  Once there, they cause inflammation that damages the myelin (or the protective coating of a nerve cell, much like the plastic  covering on an electrical cord) and parts of the nerve cell itself.  So far, a clear cause for this immune system dysfunction has not been found.  MS often starts out as the  relapsing-remitting  form.  This means there are episodes when you have symptoms, and other times when you recover to normal or near-normal.  Over time, if the damage to the nervous system continues, the disease can cause additional disability, such as difficulty walking.  If the relapses and nerve damage can be prevented with available medications, many patients with MS can go many years between relapses and have relatively little disability.  Remember: MS is a condition that changes and must be evaluated on an ongoing basis!  What is a Relapse? (Also called flare-ups, attacks, or exacerbations)  Relapses are due to the occurrence of inflammation in some part of the brain and/or spinal cord.  A relapse is new or recurrent symptoms which persist for at least 24 hours and sometimes worsen over 48 hours.  New symptoms need to be  by at least 1 month in order to be considered separate relapses. Most of the time, symptoms reach their maximal intensity within 2 weeks and then begin to slowly resolve.  At times, your symptoms may not recover fully for up to 6 months, depending on the severity of the episode.  The frequency of relapses is generally higher early in the disease, but can vary greatly among individuals with MS.  Improvement of symptoms for an individual is unpredictable with each relapse.    It is important to remember that an increase in symptoms and changes in function may not necessarily be a relapse.  There are other factors that contribute to such changes, such as hot weather, increased body temperature, infection/illness, stress and sleep deprivation.  The worsening of symptoms may feel like a relapse when in reality it is not.  These episodes are referred to as pseudorelapses.  Once the underlying cause is addressed,  symptoms usually fade away and you feel better.  If you experience a worsening of symptoms that lasts more than 48 hours and does not improve with cooling down, decreasing stress, or treatment of an infection, please call us and we can help to better determine whether you are having a pseudorelapse versus a relapse.

## 2018-07-31 NOTE — LETTER
7/31/2018       RE: Bobby Howard  [address] [place] [address]     Dear Colleague,    Thank you for referring your patient, Bobby Howard, to the [facility] at [facility]. Please see a copy of my visit note below.    MULTIPLE SCLEROSIS CLINIC AT THE [facility]  FOLLOWUP/ESTABLISHED PATIENT VISIT      PRINCIPAL NEUROLOGIC DIAGNOSIS: Multiple Sclerosis  Date of Onset: 9/2017  Date of Diagnosis: 12/2017  Initial Clinical Course: Monophasic  Current Clinical Course: Primary Progressive  Past Disease Modifying Therapy(ies): NA  Current Disease Modifying Therapy(ies): NA  Most Recent MRI of the Brain: 3/19/18  Most Recent MRI of the Cervical Cord 5/2/18  Most Recent MRI of the Thoracic Cord: 5/2/18  Most Recent Lumbar Puncture: 12/17/17 + OCBs           CHIEF COMPLAINT: Review of diagnostic testing      INTERVAL HISTORY:    The patient is having issues with her memory. The patient is rehab and she check her self out 5 weeks ago. The family was unaware of what happen and they filed a missing persons report. [place]. She does not know what happen to her the three weeks prior to be found in the casino. The patient is unable to follow basic instruction. She will not remember things.     Issues with current MS therapy: Not on DMT    REVIEW OF SYSTEMS:    Mood: unchanged  Spasticity:none  Bladder: none  Bowel: unchanged  Pain related to today's visit:reviewed on nursing intake documentation  Fatigue: she reports that it goes up and done. She does not think that anything that makes it better or worse. She will walk around letheragic.She needs to take naps some times  Sleep: sleeps 8-9 hours per night, interrupted and nightmares  Memory/Concentration: worse      Otherwise 10 point ROS was neg other than the symptoms noted above.    PAST HISTORY was reviewed and updated:      MEDICATIONS and ALLERGIES were  reviewed and updated.    SOCIAL HISTORY was reviewed and updated:        EXAM:    PHYSICAL EXAMINATION:   VITAL SIGNS:  B/P: 104/68, T: Data Unavailable, P: 80, R: Data Unavailable    GENERAL: The patient is a well-nourished  who presents to the evaluation with her mother.  NEUROLOGIC:   MENTAL STATUS: Alert,awake and oriented times four. She has poor insight, only remember 1 out 3 with delayed recall. Emotionally liable   CRANIAL NERVES:  Visual fields are full to confrontation. The pupils are  round and react to light and there is no Jovanny Elise pupil.  Extraocular movements are  intact with no  internuclear ophthalmoplegia. No nystagmus. Facial strength and sensation are  normal. Hearing is  normal. Palate elevation and tongue protrusion are  normal.   POWER:     Motor    Upper      Right Left   Shoulder Abduction 5 5   Elbow Flexion 5 5   Elbow Extension 5 5   Wrist Extension 5 5   Digit Extension 5 5   Digit Flexion 5 5   APB 5 5   Tone 0 0   Lower       Right Left   Hip Flexion 5 5   Knee Extension 5 5   Knee Flexion 5 5   Foot Dorsiflexion 5 5   Foot Plantar Flexion 5 5   EH 5 5   Toe Flexion 5 5   Tone 0 0           Grade Description   0 No increase in muscle tone   1 Slight increase in muscle tone, manifested by a catch and release or by minimal resistance at the end of the range of motion when the affected part(s) is moved in flexion or extension   1+ Slight increase in muscle tone, manifested by a catch, followed by minimal resistance throughout the remainder (less than half) of the ROM   2 More marked increase in muscle tone through most of the ROM, but affected part(s) easily moved   3 Considerable increase in muscle tone, passive movement difficult   4 Affected part(s) rigid in flexion or extension           SENSORY:     Light touch:  Intact in all extremities      MOTOR/CEREBELLAR:    Right Left   RRM 0 Normal 0 Normal   DANNY 0 Normal 0 Normal   FTN 0 Normal 0 Normal   RRM 1 Abnormal 0 Normal   HKS 0  Normal 0 Normal           GAIT: Gait is   narrow-based and steady and the patient is  able to walk on heels, toes and in tandem without difficulty.    Romberg: Stable with eye(s) closed    RESULTS:  Monitoring labs:    Office Visit on 04/17/2018   Component Date Value Ref Range Status     Purkinje Cell Neuronal Nuclear IgG* 04/17/2018 None Detected  None Detected Final     Miscellaneous Test 04/17/2018      Final                    Value:Specimen Received, Reordered and sent to Performing laboratory - Report to follow upon   completion.       Hepatitis B Core Sonal 04/17/2018 Nonreactive  NR^Nonreactive Final     Hepatitis B Surface Antibody 04/17/2018 0.19  <8.00 m[IU]/mL Final     Hep B Surface Agn 04/17/2018 Nonreactive  NR^Nonreactive Final     HCV RNA Quant IU/ml 04/17/2018 HCV RNA Not Detected  HCVND^HCV RNA Not Detected [IU]/mL Final     Log of HCV RNA Qt 04/17/2018 Not Calculated  <1.2 Log IU/mL Final     Lab Scanned Result 04/17/2018 DINORA VIR AB INDEX REFLEX-Scanned*  Final     M Tuberculosis Result 04/17/2018 Negative  NEG^Negative Final     M Tuberculosis Antigen Value 04/17/2018 0.01  IU/mL Final     Vitamin B12 04/17/2018 504  193 - 986 pg/mL Final     Vitamin B6 04/17/2018 47.8  20.0 - 125.0 nmol/L Final     Vitamin D Deficiency screening 04/17/2018 33  20 - 75 ug/L Final     WBC 04/17/2018 10.6  4.0 - 11.0 10e9/L Final     RBC Count 04/17/2018 3.98  3.8 - 5.2 10e12/L Final     Hemoglobin 04/17/2018 13.1  11.7 - 15.7 g/dL Final     Hematocrit 04/17/2018 37.2  35.0 - 47.0 % Final     MCV 04/17/2018 94  78 - 100 fl Final     MCH 04/17/2018 32.9  26.5 - 33.0 pg Final     MCHC 04/17/2018 35.2  31.5 - 36.5 g/dL Final     RDW 04/17/2018 11.5  10.0 - 15.0 % Final     Platelet Count 04/17/2018 249  150 - 450 10e9/L Final     Diff Method 04/17/2018 Automated Method   Final     % Neutrophils 04/17/2018 62.3  % Final     % Lymphocytes 04/17/2018 31.0  % Final     % Monocytes 04/17/2018 4.6  % Final     % Eosinophils  04/17/2018 1.0  % Final     % Basophils 04/17/2018 0.6  % Final     % Immature Granulocytes 04/17/2018 0.5  % Final     Nucleated RBCs 04/17/2018 0  0 /100 Final     Absolute Neutrophil 04/17/2018 6.6  1.6 - 8.3 10e9/L Final     Absolute Lymphocytes 04/17/2018 3.3  0.8 - 5.3 10e9/L Final     Absolute Monocytes 04/17/2018 0.5  0.0 - 1.3 10e9/L Final     Absolute Eosinophils 04/17/2018 0.1  0.0 - 0.7 10e9/L Final     Absolute Basophils 04/17/2018 0.1  0.0 - 0.2 10e9/L Final     Abs Immature Granulocytes 04/17/2018 0.1  0 - 0.4 10e9/L Final     Absolute Nucleated RBC 04/17/2018 0.0   Final     Sodium 04/17/2018 140  133 - 144 mmol/L Final     Potassium 04/17/2018 3.6  3.4 - 5.3 mmol/L Final     Chloride 04/17/2018 108  94 - 109 mmol/L Final     Carbon Dioxide 04/17/2018 25  20 - 32 mmol/L Final     Anion Gap 04/17/2018 7  3 - 14 mmol/L Final     Glucose 04/17/2018 94  70 - 99 mg/dL Final     Urea Nitrogen 04/17/2018 11  7 - 30 mg/dL Final     Creatinine 04/17/2018 0.62  0.52 - 1.04 mg/dL Final     GFR Estimate 04/17/2018 >90  >60 mL/min/1.7m2 Final     GFR Estimate If Black 04/17/2018 >90  >60 mL/min/1.7m2 Final     Calcium 04/17/2018 9.2  8.5 - 10.1 mg/dL Final     Bilirubin Total 04/17/2018 0.3  0.2 - 1.3 mg/dL Final     Albumin 04/17/2018 4.2  3.4 - 5.0 g/dL Final     Protein Total 04/17/2018 7.4  6.8 - 8.8 g/dL Final     Alkaline Phosphatase 04/17/2018 86  40 - 150 U/L Final     ALT 04/17/2018 16  0 - 50 U/L Final     AST 04/17/2018 10  0 - 45 U/L Final     Acetaminophen Qual 04/17/2018 Negative  NEG^Negative Final     Amobarbital Qual 04/17/2018 Negative  NEG^Negative Final     Barbital Qual 04/17/2018 Negative  NEG^Negative Final     Butabarbital Qual 04/17/2018 Negative  NEG^Negative Final     Butalbital Qual 04/17/2018 Negative  NEG^Negative Final     Caffeine Qual 04/17/2018 Positive* NEG^Negative Final     Carbamazepine Qual 04/17/2018 Negative  NEG^Negative Final     Carisoprodol Qual 04/17/2018 Negative   NEG^Negative Final     Chlorpropamide Qual 04/17/2018 Negative  NEG^Negative Final     Ethclorvynol Qual 04/17/2018 Negative  NEG^Negative Final     Ethinamate Qual 04/17/2018 Negative  NEG^Negative Final     Ethosuximide Qual 04/17/2018 Negative  NEG^Negative Final     Ethotoin Qual 04/17/2018 Negative  NEG^Negative Final     Glutethimide Qual 04/17/2018 Negative  NEG^Negative Final     Ibuprofen Qual 04/17/2018 Negative  NEG^Negative Final     Mephenytoin Qual 04/17/2018 Negative  NEG^Negative Final     Mephobarbital Qual 04/17/2018 Negative  NEG^Negative Final     Meprobamate Qual 04/17/2018 Negative  NEG^Negative Final     Methaqualone Qual 04/17/2018 Negative  NEG^Negative Final     Metharbital Qual 04/17/2018 Negative  NEG^Negative Final     Methsuximide Qual 04/17/2018 Negative  NEG^Negative Final     Methyprylon Qual 04/17/2018 Negative  NEG^Negative Final     Pentobarbital Qual 04/17/2018 Negative  NEG^Negative Final     Phenacetin Qual 04/17/2018 Negative  NEG^Negative Final     Phenobarbital Qual 04/17/2018 Negative  NEG^Negative Final     Phensuximide Qual 04/17/2018 Negative  NEG^Negative Final     Phenytoin Qual 04/17/2018 Negative  NEG^Negative Final     Primidone Qual 04/17/2018 Negative  NEG^Negative Final     Salicylate Qual 04/17/2018 Negative  NEG^Negative Final     Secobarbital Qual 04/17/2018 Negative  NEG^Negative Final     Talbutal Qual 04/17/2018 Negative  NEG^Negative Final     Theophylline Qual 04/17/2018 Negative  NEG^Negative Final     Thiopental Qual 04/17/2018 Negative  NEG^Negative Final     Trimethadidone Qual 04/17/2018 Negative  NEG^Negative Final     Tybamate Qual 04/17/2018 Negative  NEG^Negative Final     Valproic Acid Qual 04/17/2018 Negative  NEG^Negative Final     HCG Quantitative Serum 04/17/2018 <1  0 - 5 IU/L Final     Angiotensin Converting Enzyme 04/17/2018 26  9 - 67 U/L Final     BIRD interpretation 04/17/2018 Borderline Positive* NEG^Negative Final     BIRD pattern 1  04/17/2018 HOMOGENEOUS   Final     BIRD titer 1 04/17/2018 1:80   Final     HIV Antigen Antibody Combo 04/17/2018 Nonreactive  NR^Nonreactive     Final     Rheumatoid Factor 04/17/2018 <20  <20 IU/mL Final     SSA (Ro) (CHERELLE) Antibody, IgG 04/17/2018 2.6* 0.0 - 0.9 AI Final     SSB (La) (CHERELLE) Antibody, IgG 04/17/2018 <0.2  0.0 - 0.9 AI Final     Tissue Transglutaminase Antibody I* 04/17/2018 1  <7 U/mL Final     Tissue Transglutaminase Sonal IgG 04/17/2018 <1  <7 U/mL Final     TSH 04/17/2018 1.21  0.40 - 4.00 mU/L Final     Varicella Zoster Virus Antibody IgG 04/17/2018 4.5* 0.0 - 0.8 AI Final     Myeloperoxidase Antibody IgG 04/17/2018 <0.2  0.0 - 0.9 AI Final     Proteinase 3 Antibody IgG 04/17/2018 <0.2  0.0 - 0.9 AI Final     Sed Rate 04/17/2018 8  0 - 20 mm/h Final     Result 04/17/2018 SEE NOTE 04/26/2018 02:41 PM   Final     Test Name 04/17/2018 CNS DEMYELINATING   Final     Send Outs Misc Test Code 04/17/2018 CDS1   Final     Send Outs Misc Test Specimen 04/17/2018 Serum   Final   Office Visit on 03/05/2018   Component Date Value Ref Range Status     Keppra (Levetiracetam) Level 03/05/2018 33  12 - 46 ug/mL Final   Admission on 02/13/2018, Discharged on 02/13/2018   Component Date Value Ref Range Status     Amphetamine Qual Urine 02/13/2018 Negative  NEG^Negative Final     Barbiturates Qual Urine 02/13/2018 Negative  NEG^Negative Final     Benzodiazepine Qual Urine 02/13/2018 Negative  NEG^Negative Final     Cannabinoids Qual Urine 02/13/2018 Negative  NEG^Negative Final     Cocaine Qual Urine 02/13/2018 Negative  NEG^Negative Final     Ethanol Qual Urine 02/13/2018 Negative  NEG^Negative Final     Opiates Qualitative Urine 02/13/2018 Negative  NEG^Negative Final     HCG Qual Urine 02/13/2018 Negative  NEG^Negative Final   ]      MRI brain:    MRI Dated 7/31/18:  Severe T2 disease burden with 0 enhancion lesion(s).  compared to MRI on 5/18 there are >3 new T2 lesion(s).    ASSESSMENT/PLAN:  The patient is a  27-year-old female with a past medical history of potential CNS vasculitis secondary to polysubstance abuse who is presenting today as a follow-up.  The patient has continued evolution of lesions on her MRI, which is suggestive of ongoing process.  However, some of these new lesions may only appear new because of the thinner slices with this most current MRI.  I did not see any active enhancing lesions today.  So therefore it appears that her disease is somewhat inactive currently.  If in fact she does have new lesions, it would be worthwhile to consider treatment.  Given I am unsure with the exact cause of her CNS demyelination, I would favor Rituxan.  I will need to check screening blood work given that she recently had a relapsse with her polysubstance abuse.  I advised the patient that she needs to comply with treatment.  I explained to her that by potentially not taking her Keppra for her seizures that she could potentially die.  The patient seemed to express understanding of this.  I also feel given the patient's significant impairment in executive function that she should undergo neuropsych testing.  I think the patient probably needs a guardian.  I will follow the patient up in 3 months.  I instructed the patient and her mother to call my office if there is any questions or concerns.    CMP, CBC +diff, VZV IgG, JCV+index, remote hepatitis panel, TB quant, Vitamin D, Vitamin b12, folate, TSH, and copper   Neuropsych follow up  Consider rituxan         I spent 25 minutes in this visit, with >50% direct patient time spent counseling about prognosis, treatment options, and coordination of care.    Gurinder Edouard MD [facility]  Staff Neurologist   07/31/18       (Chart documentation was completed in part with Dragon voice-recognition software. Even though reviewed, some grammatical, spelling, and word errors may remain.)

## 2018-07-31 NOTE — PROGRESS NOTES
MULTIPLE SCLEROSIS CLINIC AT THE [facility]  FOLLOWUP/ESTABLISHED PATIENT VISIT      PRINCIPAL NEUROLOGIC DIAGNOSIS: Multiple Sclerosis  Date of Onset: 9/2017  Date of Diagnosis: 12/2017  Initial Clinical Course: Monophasic  Current Clinical Course: Primary Progressive  Past Disease Modifying Therapy(ies): NA  Current Disease Modifying Therapy(ies): NA  Most Recent MRI of the Brain: 3/19/18  Most Recent MRI of the Cervical Cord 5/2/18  Most Recent MRI of the Thoracic Cord: 5/2/18  Most Recent Lumbar Puncture: 12/17/17 + OCBs           CHIEF COMPLAINT: Review of diagnostic testing      INTERVAL HISTORY:    The patient is having issues with her memory. The patient is rehab and she check her self out 5 weeks ago. The family was unaware of what happen and they filed a missing persons report. [place]. She does not know what happen to her the three weeks prior to be found in the casino. The patient is unable to follow basic instruction. She will not remember things.     Issues with current MS therapy: Not on DMT    REVIEW OF SYSTEMS:    Mood: unchanged  Spasticity:none  Bladder: none  Bowel: unchanged  Pain related to today's visit:reviewed on nursing intake documentation  Fatigue: she reports that it goes up and done. She does not think that anything that makes it better or worse. She will walk around letheragic.She needs to take naps some times  Sleep: sleeps 8-9 hours per night, interrupted and nightmares  Memory/Concentration: worse      Otherwise 10 point ROS was neg other than the symptoms noted above.    PAST HISTORY was reviewed and updated:      MEDICATIONS and ALLERGIES were reviewed and updated.    SOCIAL HISTORY was reviewed and updated:        EXAM:    PHYSICAL EXAMINATION:   VITAL SIGNS:  B/P: 104/68, T: Data Unavailable, P: 80, R: Data Unavailable    GENERAL: The patient is a well-nourished  who presents to the evaluation with her mother.  NEUROLOGIC:    MENTAL STATUS: Alert,awake and oriented times four. She has poor insight, only remember 1 out 3 with delayed recall. Emotionally liable   CRANIAL NERVES:  Visual fields are full to confrontation. The pupils are  round and react to light and there is no Jovanny Elise pupil.  Extraocular movements are  intact with no  internuclear ophthalmoplegia. No nystagmus. Facial strength and sensation are  normal. Hearing is  normal. Palate elevation and tongue protrusion are  normal.   POWER:     Motor    Upper      Right Left   Shoulder Abduction 5 5   Elbow Flexion 5 5   Elbow Extension 5 5   Wrist Extension 5 5   Digit Extension 5 5   Digit Flexion 5 5   APB 5 5   Tone 0 0   Lower       Right Left   Hip Flexion 5 5   Knee Extension 5 5   Knee Flexion 5 5   Foot Dorsiflexion 5 5   Foot Plantar Flexion 5 5   EH 5 5   Toe Flexion 5 5   Tone 0 0           Grade Description   0 No increase in muscle tone   1 Slight increase in muscle tone, manifested by a catch and release or by minimal resistance at the end of the range of motion when the affected part(s) is moved in flexion or extension   1+ Slight increase in muscle tone, manifested by a catch, followed by minimal resistance throughout the remainder (less than half) of the ROM   2 More marked increase in muscle tone through most of the ROM, but affected part(s) easily moved   3 Considerable increase in muscle tone, passive movement difficult   4 Affected part(s) rigid in flexion or extension           SENSORY:     Light touch:  Intact in all extremities      MOTOR/CEREBELLAR:    Right Left   RRM 0 Normal 0 Normal   DANNY 0 Normal 0 Normal   FTN 0 Normal 0 Normal   RRM 1 Abnormal 0 Normal   HKS 0 Normal 0 Normal           GAIT: Gait is   narrow-based and steady and the patient is  able to walk on heels, toes and in tandem without difficulty.    Romberg: Stable with eye(s) closed    RESULTS:  Monitoring labs:    Office Visit on 04/17/2018   Component Date Value Ref Range Status      Purkinje Cell Neuronal Nuclear IgG* 04/17/2018 None Detected  None Detected Final     Miscellaneous Test 04/17/2018      Final                    Value:Specimen Received, Reordered and sent to Performing laboratory - Report to follow upon   completion.       Hepatitis B Core Sonal 04/17/2018 Nonreactive  NR^Nonreactive Final     Hepatitis B Surface Antibody 04/17/2018 0.19  <8.00 m[IU]/mL Final     Hep B Surface Agn 04/17/2018 Nonreactive  NR^Nonreactive Final     HCV RNA Quant IU/ml 04/17/2018 HCV RNA Not Detected  HCVND^HCV RNA Not Detected [IU]/mL Final     Log of HCV RNA Qt 04/17/2018 Not Calculated  <1.2 Log IU/mL Final     Lab Scanned Result 04/17/2018 [facility] VIR AB INDEX REFLEX-Scanned*  Final     M Tuberculosis Result 04/17/2018 Negative  NEG^Negative Final     M Tuberculosis Antigen Value 04/17/2018 0.01  IU/mL Final     Vitamin B12 04/17/2018 504  193 - 986 pg/mL Final     Vitamin B6 04/17/2018 47.8  20.0 - 125.0 nmol/L Final     Vitamin D Deficiency screening 04/17/2018 33  20 - 75 ug/L Final     WBC 04/17/2018 10.6  4.0 - 11.0 10e9/L Final     RBC Count 04/17/2018 3.98  3.8 - 5.2 10e12/L Final     Hemoglobin 04/17/2018 13.1  11.7 - 15.7 g/dL Final     Hematocrit 04/17/2018 37.2  35.0 - 47.0 % Final     MCV 04/17/2018 94  78 - 100 fl Final     MCH 04/17/2018 32.9  26.5 - 33.0 pg Final     MCHC 04/17/2018 35.2  31.5 - 36.5 g/dL Final     RDW 04/17/2018 11.5  10.0 - 15.0 % Final     Platelet Count 04/17/2018 249  150 - 450 10e9/L Final     Diff Method 04/17/2018 Automated Method   Final     % Neutrophils 04/17/2018 62.3  % Final     % Lymphocytes 04/17/2018 31.0  % Final     % Monocytes 04/17/2018 4.6  % Final     % Eosinophils 04/17/2018 1.0  % Final     % Basophils 04/17/2018 0.6  % Final     % Immature Granulocytes 04/17/2018 0.5  % Final     Nucleated RBCs 04/17/2018 0  0 /100 Final     Absolute Neutrophil 04/17/2018 6.6  1.6 - 8.3 10e9/L Final     Absolute Lymphocytes 04/17/2018 3.3  0.8 - 5.3 10e9/L  Final     Absolute Monocytes 04/17/2018 0.5  0.0 - 1.3 10e9/L Final     Absolute Eosinophils 04/17/2018 0.1  0.0 - 0.7 10e9/L Final     Absolute Basophils 04/17/2018 0.1  0.0 - 0.2 10e9/L Final     Abs Immature Granulocytes 04/17/2018 0.1  0 - 0.4 10e9/L Final     Absolute Nucleated RBC 04/17/2018 0.0   Final     Sodium 04/17/2018 140  133 - 144 mmol/L Final     Potassium 04/17/2018 3.6  3.4 - 5.3 mmol/L Final     Chloride 04/17/2018 108  94 - 109 mmol/L Final     Carbon Dioxide 04/17/2018 25  20 - 32 mmol/L Final     Anion Gap 04/17/2018 7  3 - 14 mmol/L Final     Glucose 04/17/2018 94  70 - 99 mg/dL Final     Urea Nitrogen 04/17/2018 11  7 - 30 mg/dL Final     Creatinine 04/17/2018 0.62  0.52 - 1.04 mg/dL Final     GFR Estimate 04/17/2018 >90  >60 mL/min/1.7m2 Final     GFR Estimate If Black 04/17/2018 >90  >60 mL/min/1.7m2 Final     Calcium 04/17/2018 9.2  8.5 - 10.1 mg/dL Final     Bilirubin Total 04/17/2018 0.3  0.2 - 1.3 mg/dL Final     Albumin 04/17/2018 4.2  3.4 - 5.0 g/dL Final     Protein Total 04/17/2018 7.4  6.8 - 8.8 g/dL Final     Alkaline Phosphatase 04/17/2018 86  40 - 150 U/L Final     ALT 04/17/2018 16  0 - 50 U/L Final     AST 04/17/2018 10  0 - 45 U/L Final     Acetaminophen Qual 04/17/2018 Negative  NEG^Negative Final     Amobarbital Qual 04/17/2018 Negative  NEG^Negative Final     Barbital Qual 04/17/2018 Negative  NEG^Negative Final     Butabarbital Qual 04/17/2018 Negative  NEG^Negative Final     Butalbital Qual 04/17/2018 Negative  NEG^Negative Final     Caffeine Qual 04/17/2018 Positive* NEG^Negative Final     Carbamazepine Qual 04/17/2018 Negative  NEG^Negative Final     Carisoprodol Qual 04/17/2018 Negative  NEG^Negative Final     Chlorpropamide Qual 04/17/2018 Negative  NEG^Negative Final     Ethclorvynol Qual 04/17/2018 Negative  NEG^Negative Final     Ethinamate Qual 04/17/2018 Negative  NEG^Negative Final     Ethosuximide Qual 04/17/2018 Negative  NEG^Negative Final     Ethotoin Qual  04/17/2018 Negative  NEG^Negative Final     Glutethimide Qual 04/17/2018 Negative  NEG^Negative Final     Ibuprofen Qual 04/17/2018 Negative  NEG^Negative Final     Mephenytoin Qual 04/17/2018 Negative  NEG^Negative Final     Mephobarbital Qual 04/17/2018 Negative  NEG^Negative Final     Meprobamate Qual 04/17/2018 Negative  NEG^Negative Final     Methaqualone Qual 04/17/2018 Negative  NEG^Negative Final     Metharbital Qual 04/17/2018 Negative  NEG^Negative Final     Methsuximide Qual 04/17/2018 Negative  NEG^Negative Final     Methyprylon Qual 04/17/2018 Negative  NEG^Negative Final     Pentobarbital Qual 04/17/2018 Negative  NEG^Negative Final     Phenacetin Qual 04/17/2018 Negative  NEG^Negative Final     Phenobarbital Qual 04/17/2018 Negative  NEG^Negative Final     Phensuximide Qual 04/17/2018 Negative  NEG^Negative Final     Phenytoin Qual 04/17/2018 Negative  NEG^Negative Final     Primidone Qual 04/17/2018 Negative  NEG^Negative Final     Salicylate Qual 04/17/2018 Negative  NEG^Negative Final     Secobarbital Qual 04/17/2018 Negative  NEG^Negative Final     Talbutal Qual 04/17/2018 Negative  NEG^Negative Final     Theophylline Qual 04/17/2018 Negative  NEG^Negative Final     Thiopental Qual 04/17/2018 Negative  NEG^Negative Final     Trimethadidone Qual 04/17/2018 Negative  NEG^Negative Final     Tybamate Qual 04/17/2018 Negative  NEG^Negative Final     Valproic Acid Qual 04/17/2018 Negative  NEG^Negative Final     HCG Quantitative Serum 04/17/2018 <1  0 - 5 IU/L Final     Angiotensin Converting Enzyme 04/17/2018 26  9 - 67 U/L Final     BIRD interpretation 04/17/2018 Borderline Positive* NEG^Negative Final     BIRD pattern 1 04/17/2018 HOMOGENEOUS   Final     BIRD titer 1 04/17/2018 1:80   Final     HIV Antigen Antibody Combo 04/17/2018 Nonreactive  NR^Nonreactive     Final     Rheumatoid Factor 04/17/2018 <20  <20 IU/mL Final     SSA (Ro) (CHERELLE) Antibody, IgG 04/17/2018 2.6* 0.0 - 0.9 AI Final     SSB (La)  (CHERELLE) Antibody, IgG 04/17/2018 <0.2  0.0 - 0.9 AI Final     Tissue Transglutaminase Antibody I* 04/17/2018 1  <7 U/mL Final     Tissue Transglutaminase Sonal IgG 04/17/2018 <1  <7 U/mL Final     TSH 04/17/2018 1.21  0.40 - 4.00 mU/L Final     Varicella Zoster Virus Antibody IgG 04/17/2018 4.5* 0.0 - 0.8 AI Final     Myeloperoxidase Antibody IgG 04/17/2018 <0.2  0.0 - 0.9 AI Final     Proteinase 3 Antibody IgG 04/17/2018 <0.2  0.0 - 0.9 AI Final     Sed Rate 04/17/2018 8  0 - 20 mm/h Final     Result 04/17/2018 SEE NOTE 04/26/2018 02:41 PM   Final     Test Name 04/17/2018 CNS DEMYELINATING   Final     Send Outs Misc Test Code 04/17/2018 CDS1   Final     Send Outs Misc Test Specimen 04/17/2018 Serum   Final   Office Visit on 03/05/2018   Component Date Value Ref Range Status     Keppra (Levetiracetam) Level 03/05/2018 33  12 - 46 ug/mL Final   Admission on 02/13/2018, Discharged on 02/13/2018   Component Date Value Ref Range Status     Amphetamine Qual Urine 02/13/2018 Negative  NEG^Negative Final     Barbiturates Qual Urine 02/13/2018 Negative  NEG^Negative Final     Benzodiazepine Qual Urine 02/13/2018 Negative  NEG^Negative Final     Cannabinoids Qual Urine 02/13/2018 Negative  NEG^Negative Final     Cocaine Qual Urine 02/13/2018 Negative  NEG^Negative Final     Ethanol Qual Urine 02/13/2018 Negative  NEG^Negative Final     Opiates Qualitative Urine 02/13/2018 Negative  NEG^Negative Final     HCG Qual Urine 02/13/2018 Negative  NEG^Negative Final   ]      MRI brain:    MRI Dated 7/31/18:  Severe T2 disease burden with 0 enhancion lesion(s).  compared to MRI on 5/18 there are >3 new T2 lesion(s).    ASSESSMENT/PLAN:  The patient is a 27-year-old female with a past medical history of potential CNS vasculitis secondary to polysubstance abuse who is presenting today as a follow-up.  The patient has continued evolution of lesions on her MRI, which is suggestive of ongoing process.  However, some of these new lesions may  only appear new because of the thinner slices with this most current MRI.  I did not see any active enhancing lesions today.  So therefore it appears that her disease is somewhat inactive currently.  If in fact she does have new lesions, it would be worthwhile to consider treatment.  Given I am unsure with the exact cause of her CNS demyelination, I would favor Rituxan.  I will need to check screening blood work given that she recently had a relapsse with her polysubstance abuse.  I advised the patient that she needs to comply with treatment.  I explained to her that by potentially not taking her Keppra for her seizures that she could potentially die.  The patient seemed to express understanding of this.  I also feel given the patient's significant impairment in executive function that she should undergo neuropsych testing.  I think the patient probably needs a guardian.  I will follow the patient up in 3 months.  I instructed the patient and her mother to call my office if there is any questions or concerns.    CMP, CBC +diff, VZV IgG, JCV+index, remote hepatitis panel, TB quant, Vitamin D, Vitamin b12, folate, TSH, and copper   Neuropsych follow up  Consider rituxan         I spent 25 minutes in this visit, with >50% direct patient time spent counseling about prognosis, treatment options, and coordination of care.    Gurinder Edouard MD [facility]  Staff Neurologist   07/31/18       (Chart documentation was completed in part with Dragon voice-recognition software. Even though reviewed, some grammatical, spelling, and word errors may remain.)

## 2018-07-31 NOTE — MR AVS SNAPSHOT
After Visit Summary   7/31/2018    Bobby Howard    MRN: [id number]           Patient Information     Date Of Birth          1990        Visit Information        Provider Department      7/31/2018 1:00 PM Gurinder Edouard MD [facility] Multiple Sclerosis        Today's Diagnoses     CNS vasculitis (H)    -  1      Care Instructions    Bloodwork    Refer for neuropsych testing    Follow up in 3 months    Will consider Rituxan      You saw a neurology provider, Gurinder Edouard, today at the [facility].  You may have also met with the MS RN Care Coordinator.  In order to get a message to your MS Center provider, you should contact [phone]. You should contact us via this protocol if you have any of the following symptoms:    New or worsening neurologic symptoms that persist for 24-48 hours, such as:  o New onset of pain or marked worsening of pain  o Difficulty with speech, swallowing, or breathing  o New onset of vertigo or dizziness  o Change in bowel or bladder function (incontinence, difficulty urinating)    Increasing difficulty in self care    Marked changes in vision (double vision, blurred vision, graying of vision)    Change in mobility    Change in cognitive function    Falling    Worsening numbness, tingling or pain with a change in function    Worsening fatigue lasting more than 2 weeks  If you had labs completed today, we will contact you with the results.  If you are active in [employer], they will be released to you there.  Otherwise, your results will be provided to you via mail or telephone call.  Some results take up to 2 weeks for completion.  If you haven t heard anything about your lab results within 2 weeks, you can call or send a [employer] message to obtain your results.  If you have an MRI scheduled in the week or two prior to your next appointment, we will go over the results at your scheduled follow up appointment.  If you are not  scheduled to see your MS Center provider within about 2 weeks after your MRI, please call or send a [employer] message to obtain your results if you haven t heard anything within 2 weeks.  Please be aware that it takes at least 5 business days after routine MRIs for your results to be reviewed by both the radiologist and your doctor.  MRIs completed at facilities outside of the [facility] system take about 2 weeks in order for the MRI disc to be mailed to our clinic and uploaded into your medical record.    Please contact your pharmacy to request refills of your medications.   Please do your best to come to your appointments, and to arrive 15 minutes early to allow time for checking in.  [facility] reserves the right to terminate care of established patients if a patient misses three or more appointments in a clinic without providing notification within a 12-month period.    Developing Your Care Team  Individuals living with chronic illnesses like MS may be unaware that they are at risk for the same range of medical problems as everyone else.  This is why you must establish a relationship with other health care providers in addition to your Multiple Sclerosis doctor.  It can be difficult at times to figure out whether a health concern is related to your MS, or whether it is related to something else, such as hormonal changes, pseduoexacerbations, changes in your core body temperature, flu-like reactions to interferons, exercise, or infections.  Urinary tract infections (UTIs) are common culprits that can cause fatigue, weakness, or other  MS attack -like symptoms without classic symptoms of a UTI.  For this reason, if you call or come in to discuss symptoms, you may be asked to get in touch with your primary provider or another specialist, so that you receive the comprehensive care you need.  What is Multiple Sclerosis (MS)?  MS is a disease in which the nerve tissues in the brain and/or spinal  cord are attacked by immune cells in the body.  These immune cells are present in everyone, and their normal role is to fight off infections.  In people with MS, these cells change the way they function and cross into the nervous system.  Once there, they cause inflammation that damages the myelin (or the protective coating of a nerve cell, much like the plastic covering on an electrical cord) and parts of the nerve cell itself.  So far, a clear cause for this immune system dysfunction has not been found.  MS often starts out as the  relapsing-remitting  form.  This means there are episodes when you have symptoms, and other times when you recover to normal or near-normal.  Over time, if the damage to the nervous system continues, the disease can cause additional disability, such as difficulty walking.  If the relapses and nerve damage can be prevented with available medications, many patients with MS can go many years between relapses and have relatively little disability.  Remember: MS is a condition that changes and must be evaluated on an ongoing basis!  What is a Relapse? (Also called flare-ups, attacks, or exacerbations)  Relapses are due to the occurrence of inflammation in some part of the brain and/or spinal cord.  A relapse is new or recurrent symptoms which persist for at least 24 hours and sometimes worsen over 48 hours.  New symptoms need to be  by at least 1 month in order to be considered separate relapses. Most of the time, symptoms reach their maximal intensity within 2 weeks and then begin to slowly resolve.  At times, your symptoms may not recover fully for up to 6 months, depending on the severity of the episode.  The frequency of relapses is generally higher early in the disease, but can vary greatly among individuals with MS.  Improvement of symptoms for an individual is unpredictable with each relapse.    It is important to remember that an increase in symptoms and changes in function  may not necessarily be a relapse.  There are other factors that contribute to such changes, such as hot weather, increased body temperature, infection/illness, stress and sleep deprivation.  The worsening of symptoms may feel like a relapse when in reality it is not.  These episodes are referred to as pseudorelapses.  Once the underlying cause is addressed, symptoms usually fade away and you feel better.  If you experience a worsening of symptoms that lasts more than 48 hours and does not improve with cooling down, decreasing stress, or treatment of an infection, please call us and we can help to better determine whether you are having a pseudorelapse versus a relapse.                Follow-ups after your visit        Additional Services     NEUROPSYCHOLOGY REFERRAL       Your provider has referred you to:    [facility]: Adult Neuropsychology Clinic [facility] [phone] Preferred Provider: No preferred provider   [email or web address]    All scheduling is subject to the client's specific insurance plan & benefits, provider/location availability, and provider clinical specialities.  Please arrive 15 minutes early for your first appointment and bring your completed paperwork.    Please be aware that coverage of these services is subject to the terms and limitations of your health insurance plan.  Call member services at your health plan with any benefit or coverage questions.    Please bring the following to your appointment:  >>   Any x-rays, CTs or MRIs which have been performed.  Contact the facility where they were done to arrange for  prior to your scheduled appointment.  Any new CT, MRI or other procedures ordered by your specialist must be performed at a [facility] facility or coordinated by your clinic's referral office.    >>   List of current medications   >>   This referral request   >>   Any documents/labs given to you for this referral                  Your next 10  "appointments already scheduled     Jul 31, 2018  2:15 PM CDT   LAB with  LAB   [facility] ([facility])    [address]  1st Floor  [facility] [address] [phone]           Please do not eat 10-12 hours before your appointment if you are coming in fasting for labs on lipids, cholesterol, or glucose (sugar). This does not apply to pregnant women. Water, hot tea and black coffee (with nothing added) are okay. Do not drink other fluids, diet soda or chew gum.              Future tests that were ordered for you today     Open Future Orders        Priority Expected Expires Ordered    CD19 B Cell Count Routine  8/1/2019 7/31/2018            Who to contact     If you have questions or need follow up information about today's clinic visit or your schedule please contact [facility] directly at [phone].  Normal or non-critical lab and imaging results will be communicated to you by MyChart, letter or phone within 4 business days after the clinic has received the results. If you do not hear from us within 7 days, please contact the clinic through [employer] or phone. If you have a critical or abnormal lab result, we will notify you by phone as soon as possible.  Submit refill requests through [employer] or call your pharmacy and they will forward the refill request to us. Please allow 3 business days for your refill to be completed.          Additional Information About Your Visit        [employer] Information     [employer] lets you send messages to your doctor, view your test results, renew your prescriptions, schedule appointments and more. To sign up, go to [email or web address] . Click on \"Log in\" on the left side of the screen, which will take you to the Welcome page. Then click on \"Sign up Now\" on the right side of the page.     You will be asked to enter the access code listed below, as well as some personal information. Please follow the directions to create " "your username and password.     Your access code is: [email or web address]  Expires: 10/18/2018  6:30 AM     Your access code will  in 90 days. If you need help or a new code, please call your [facility] or [phone].        Care EveryWhere ID     This is your Care EveryWhere ID. This could be used by other organizations to access your [facility] medical records  [id number]        Your Vitals Were     Pulse Height Pulse Oximetry BMI (Body Mass Index)          80 1.588 m (5' 2.5\") 98% 23.79 kg/m2         Blood Pressure from Last 3 Encounters:   18 104/68   18 104/68   18 122/66    Weight from Last 3 Encounters:   18 60 kg (132 lb 3.2 oz)   18 64.3 kg (141 lb 12.8 oz)   18 60.1 kg (132 lb 8 oz)              We Performed the Following     CBC with platelets differential     Comprehensive metabolic panel     HCG qualitative     Hepatitis B core antibody     Hepatitis B Surface Antibody     Hepatitis B surface antigen     Hepatitis C RNA quantitative     HIV Antigen Antibody Combo     M Tuberculosis by Quantiferon     NEUROPSYCHOLOGY REFERRAL     Rheumatoid factor     Treponema Abs w Reflex to RPR and Titer     Varicella Zoster Virus Antibody IgG     Vitamin B12     Vitamin B6     Vitamin D Deficiency          Today's Medication Changes          These changes are accurate as of 18  2:00 PM.  If you have any questions, ask your nurse or doctor.               These medicines have changed or have updated prescriptions.        Dose/Directions    melatonin 3 MG tablet   This may have changed:  how to take this   Used for:  Persistent disorder of initiating or maintaining sleep        Dose:  3-6 mg   1-2 tablets (3-6 mg) by Oral or Feeding Tube route At Bedtime   Quantity:  60 tablet   Refills:  3                Primary Care Provider Office Phone # Fax #    Cash Dotson [phone]       [address] [facility] [address]        Equal Access to " Services     [facility]: Hadii aad ku haddavidfrederick Daniel, waaxda luqadaha, qaybta kaalmada goran, shana ruiz . So [facility] [phone].    ATENCIÓN: Si habla shira, tiene a hairston disposición servicios gratuitos de asistencia lingüística. Llame al [phone].    We comply with applicable federal civil rights laws and [place] laws. We do not discriminate on the basis of race, color, national origin, age, disability, sex, sexual orientation, or gender identity.            Thank you!     Thank you for choosing [facility]  for your care. Our goal is always to provide you with excellent care. Hearing back from our patients is one way we can continue to improve our services. Please take a few minutes to complete the written survey that you may receive in the mail after your visit with us. Thank you!             Your Updated Medication List - Protect others around you: Learn how to safely use, store and throw away your medicines at www.disposemymeds.org.          This list is accurate as of 7/31/18  2:00 PM.  Always use your most recent med list.                   Brand Name Dispense Instructions for use Diagnosis    etonogestrel 68 MG Impl    IMPLANON/NEXPLANON     Inject 68 mg Subcutaneous        folic acid 1 MG tablet    FOLVITE    60 tablet    Take 1 tablet (1 mg) by mouth daily    Polysubstance dependence including opioid type drug with complication, continuous use (H)       levETIRAcetam 750 MG tablet    KEPPRA    120 tablet    Take 2 tablets (1,500 mg) by mouth 2 times daily    Status epilepticus, generalized convulsive (H)       melatonin 3 MG tablet     60 tablet    1-2 tablets (3-6 mg) by Oral or Feeding Tube route At Bedtime    Persistent disorder of initiating or maintaining sleep       multivitamin, therapeutic Tabs tablet     60 tablet    Take 1 tablet by mouth daily    Polysubstance dependence including opioid type drug with complication, continuous use (H)        thiamine 100 MG tablet     60 tablet    Take 1 tablet (100 mg) by mouth daily    Polysubstance dependence including opioid type drug with complication, continuous use (H)

## 2018-08-01 LAB
CD19 CELLS # BLD: 465 CELLS/UL (ref 107–698)
CD19 CELLS NFR BLD: 17 % (ref 6–27)
RHEUMATOID FACT SER NEPH-ACNC: <20 IU/ML (ref 0–20)
T PALLIDUM AB SER QL: NONREACTIVE
VZV IGG SER QL IA: 4.1 AI (ref 0–0.8)

## 2018-08-01 ASSESSMENT — PATIENT HEALTH QUESTIONNAIRE - PHQ9: SUM OF ALL RESPONSES TO PHQ QUESTIONS 1-9: 9

## 2018-08-02 LAB
GAMMA INTERFERON BACKGROUND BLD IA-ACNC: 0.2 IU/ML
HCV RNA SERPL NAA+PROBE-ACNC: NORMAL [IU]/ML
HCV RNA SERPL NAA+PROBE-LOG IU: NORMAL LOG IU/ML
M TB IFN-G BLD-IMP: NEGATIVE
M TB IFN-G CD4+ BCKGRND COR BLD-ACNC: >10 IU/ML
MITOGEN IGNF BCKGRD COR BLD-ACNC: 0.02 IU/ML
MITOGEN IGNF BCKGRD COR BLD-ACNC: 0.02 IU/ML

## 2018-08-04 LAB — VIT B6 SERPL-MCNC: 31 NMOL/L (ref 20–125)

## 2018-08-16 ENCOUNTER — OFFICE VISIT (OUTPATIENT)
Dept: NEUROPSYCHOLOGY | Facility: CLINIC | Age: 28
End: 2018-08-16
Payer: COMMERCIAL

## 2018-08-16 DIAGNOSIS — I77.6 CNS VASCULITIS (H): Primary | ICD-10-CM

## 2018-08-16 DIAGNOSIS — F09 MENTAL DISORDER DUE TO GENERAL MEDICAL CONDITION: ICD-10-CM

## 2018-08-16 NOTE — PROGRESS NOTES
Pt was seen for neuropsychological evaluation at the request of Guirnder Edouard for the purposes of diagnostic clarification and treatment planning. A half hour of face-to-face testing was provided by this writer. Please see Dr. Leighann Fernandez's report for a full interpretation of the findings.    Francesca Rod MA  Psychometrist

## 2018-08-16 NOTE — MR AVS SNAPSHOT
After Visit Summary   2018    Bobby Howard    MRN: [id number]           Patient Information     Date Of Birth          1990        Visit Information        Provider Department      2018 8:00 AM Leighann Fernandez, PhD 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 With [employer], you can request a clinic appointment, read your test results, renew a prescription or communicate with your care team.     To sign up for [employer] visit the website at [email or web address]   You will be asked to enter the access code listed below, as well as some personal information. Please follow the directions to create your username and password.     Your access code is: [email or web address]  Expires: 10/18/2018  6:30 AM     Your access code will  in 90 days. If you need help or a new code, please contact your [facility] or call [phone] for assistance.        Care EveryWhere ID     This is your Care EveryWhere ID. This could be used by other organizations to access your [facility] medical records  [id number]         Blood Pressure from Last 3 Encounters:   18 104/68   18 104/68   18 122/66    Weight from Last 3 Encounters:   18 60 kg (132 lb 3.2 oz)   18 64.3 kg (141 lb 12.8 oz)   18 60.1 kg (132 lb 8 oz)              We Performed the Following     [id number] TESTING, PER HR/PSYCHOLOGIST     NEUROPSYCH TESTING BY  TECH          Today's Medication Changes          These changes are accurate as of 8/16/18 11:59 PM.  If you have any questions, ask your nurse or doctor.               These medicines have changed or have updated prescriptions.        Dose/Directions    melatonin 3 MG tablet   This may have changed:  how to take this   Used for:  Persistent disorder of initiating or maintaining sleep        Dose:  3-6 mg   1-2 tablets (3-6 mg) by Oral or Feeding Tube route At Bedtime   Quantity:  60 tablet   Refills:  3                Primary Care Provider Office Phone # Fax #    Socratesm[facility] Fausto Dotson [phone] [address] [facility] [address]        Equal Access to Services     [facility]: Hadii dick barreto hadevelia Daniel, waaxda mohsen, qaybta kaalmada goran, shana ruiz . So [facility] [phone].    ATENCIÓN: Si habla español, tiene a hairston disposición servicios gratuitos de asistencia lingüística. [facility] [phone].    We comply with applicable federal civil rights laws and [place] laws. We do not discriminate on the basis of race, color, national origin, age, disability, sex, sexual orientation, or gender identity.            Thank you!     Thank you for choosing [facility] NEUROPSYCHOLOGY  for your care. Our goal is always to provide you with excellent care. Hearing back from our patients is one way we can continue to improve our services. Please take a few minutes to complete the written survey that you may receive in the mail after your visit with us. Thank you!             Your Updated Medication List - Protect others around you: Learn how to safely use, store and throw away your medicines at www.disposemymeds.org.          This list is accurate as of 8/16/18 11:59 PM.  Always use your most recent med list.                   Brand Name Dispense Instructions for use Diagnosis    etonogestrel 68 MG Impl    IMPLANON/NEXPLANON     Inject 68 mg Subcutaneous         folic acid 1 MG tablet    FOLVITE    60 tablet    Take 1 tablet (1 mg) by mouth daily    Polysubstance dependence including opioid type drug with complication, continuous use (H)       levETIRAcetam 750 MG tablet    KEPPRA    120 tablet    Take 2 tablets (1,500 mg) by mouth 2 times daily    Status epilepticus, generalized convulsive (H)       melatonin 3 MG tablet     60 tablet    1-2 tablets (3-6 mg) by Oral or Feeding Tube route At Bedtime    Persistent disorder of initiating or maintaining sleep       multivitamin, therapeutic Tabs tablet     60 tablet    Take 1 tablet by mouth daily    Polysubstance dependence including opioid type drug with complication, continuous use (H)       thiamine 100 MG tablet     60 tablet    Take 1 tablet (100 mg) by mouth daily    Polysubstance dependence including opioid type drug with complication, continuous use (H)

## 2018-08-20 ENCOUNTER — TELEPHONE (OUTPATIENT)
Dept: NEUROLOGY | Facility: CLINIC | Age: 28
End: 2018-08-20

## 2018-08-20 NOTE — LETTER
"2018    INSURER: [facility]  Re: Prior Authorization Request  Patient: Bobby Howard  Policy ID#:  [id number]  : 1990      To Whom it May Concern:    I am writing to formally request a prior authorization of coverage for my patient,  Bobby Howard, for treatment using Rituxan.  I am requesting authorization for applicable provider professional and facility services associated with this therapy.    The therapy involves given every 6 months except for the first dose which is divided in half given 2 weeks apart.    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    I have treated Bobby Howard since 20 and I have determined that it is medically appropriate for this patient to receive be treated with Rituxan for the reason(s) stated below:    [facility] the patient did not have any neurologic issues. However, she did have a long significant history of substance abuse and was on a methamphetamine \"binge\" in early . However, she reportedly became sober. Roughly start in 2017, the patient started to developed a decline in her intelligence and mood. Roughly in November, the patient started to developing staring spells On , the patient a generalized tonic clonic seizure. The patient was transferred to the outside hospital where she had three more seizure. At the outside hospital, she was loaded with Keppra and Versed and intubation was attempted, but failed. She did have a fourth seizure at the outside hospital and was transferred to [facility] for further care. While en route to [facility], she had a fifth seizure which prompted paralyzation and subsequent intubation. Per chart review it appears she was given boluses of propofol at [facility] and transferred here for continuous video EEG monitoring. On arrival to [facility] she maintained on propofol infusion while " video EEG was obtained. She was started on and maintained on Keppra. Video EEG ran for 6 days, which did intermittently show some right temporal sharps that ultimately were determined to not be epileptic. She had no discrete seizures and her EEG progressively improved. 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 Lumbar puncture was performed, and CSF was negative for infectious etiologies including Neisseria meningitidis, H influenza, mycoplasma, cryptococcus, VZV, HSV, JCV, [place], enterovirus. HIV negative. Oligoclonal bands were elevated in the CSF, consistent with breakdown of the blood brain barrier secondary to  toxic leukoencephalopathy. The patient began to follow with me after hospitalization.  Originally, it was felt that her significantly abnormal MRI was due to a static injury.  However, the patient's MRI continue to evolve over time.  Patient has developed several new lesions.  Patient's clinical picture does not really fit any true autoimmune disease perfectly well.  The differential in her is rather broad including multiple sclerosis, and antibody negative NMO, and other CNS vasculitis.  For this reason, I felt the most appropriate choice would be a medication that can treat all these conditions.  Rituxan has proven efficacy for several CNS autoimmune conditions.  The patient also would strongly benefit from infusion therapy to ensure compliance.  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  Failure to treat this underlying autoimmune condition could lead to death and permanent disability.      I have included medical records pertaining to the patient s medical history, current condition and treatment plan.     Attached are articles for your reference.    I firmly  believe that this therapy is clinically appropriate and that Bobby Howard would benefit from improved [clinical outcomes, quality of life] if allowed the opportunity to receive this treatment.  Please contact me at Dept: [phone] if you require additional information to ensure the prompt approval for coverage.    Please send your written decision to me at this address:  [facility] [place] MULTIPLE SCLEROSIS  [address]  [address] [place] [id number] [phone]  Dept: [phone]      Sincerely,      Gurinder Edouard MD        Enclosures    TI  Rituximab for primary angiitis of the central nervous system: report of 2 patients from the [facility] cohort and review of the literature.  AU  Julio EPPS, Alida C, Dale L, Natan C   SO  J Rheumatol. 2013 Dec;40(12):2102-3.    AD  Department of Internal Medicine, [facility]; and Division of Internal Medicine, [facility], [facility];   PMID   [id number]  TI  Treatment of primary CNS vasculitis with rituximab: case report.  AU  Quin LAURENT, Nader BURROUGHS Jr, Hao J 3rd, Dinesh JM, Jeffryer GG   SO  Neurology. 2014;82(14):1287. Epub 2014 Mar 5.   Primary CNS vasculitis (PCNSV) is an uncommon disorder of unknown cause that is restricted to brain and spinal cord. Glucocorticoids alone or in combination with cyclophosphamide achieve a favorable response in most patients.(1,2) However, some patients are intolerant or respond poorly to cyclophosphamide; therefore, there is the need for new treatment options. We report a patient with PCNSV who appeared to respond to treatment with corticosteroids and rituximab. This study was approved by the [facility] Institutional Review Board and written informed patient consent to perform the study was obtained.    AD  From [facility], [facility]  [facility]; [facility] ([facility]; and [facility] (R.ELEANOR.MARY., J.H., J.M.M., G.G.H.), [place], [place].   PMID   [id number]  TI  Rituximab versus fingolimod after natalizumab in multiple sclerosis patients.  AU  Enrique P, Juliet T, Andrea L, Virginie P, Melissa J, Ofelia A, Jaden M, Rafia C, Nnamdi K, Sotero J, Logan A, Lilibeth F   SO  Cindi Neurol. 2016 Tereso;79(6):950-8. Epub 2016 Apr 20.   OBJECTIVE Many DINORA virus antibody-positive relapsing-remitting multiple sclerosis (RRMS) patients who are stable on natalizumab switch to other therapies to avoid progressive multifocal leukoencephalopathy.   METHODS We compared outcomes for all RRMS patients switching from natalizumab due to DINORA virus antibody positivity at 3 [facility] multiple sclerosis centers with different preferential use of rituximab and fingolimod ([place], n = 156, fingolimod 51%; [place], n = 64, fingolimod 88%; [facility], n = 36, fingolimod 19%), yielding a total cohort of N = 256 (fingolimod 55%).   RESULTS Within 1.5 years of cessation of natalizumab, 1.8% (rituximab) and 17.6% (fingolimod) of patients experienced a clinical relapse (hazard ratio for rituximab = 0.10, 95% confidence interval [CI]= 0.02-0.43). The hazard ratio (favoring rituximab) for adverse events (5.3% vs 21.1%) and treatment discontinuation (1.8% vs 28.2%) were 0.25 (95% CI = 0.10-0.59) and 0.07 (95% CI = 0.02-0.30), respectively. Furthermore, contrast-enhancing lesions were found in 1.4% (rituximab) versus 24.2% (fingolimod) of magnetic resonance imaging examinations (odds ratio = 0.05, 95% CI = 0.00-0.22). Differences remained when adjusting for possible confounders (age, sex, disability status, time on natalizumab, washout time, follow-up time, and study center).   INTERPRETATION Our findings suggest an improved effectiveness and tolerability of rituximab compared with fingolimod in stable RRMS patients who switch from  natalizumab due to DINORA virus antibody positivity. Although residual confounding factors cannot be ruled out, the shared reason for switching from natalizumab and the preferential use of either rituximab or fingolimod in 2 of the centers mitigates these concerns. Cindi Neurol 2016;79:950-958.    ALLY  Department of Clinical Neuroscience, [facility]

## 2018-08-20 NOTE — TELEPHONE ENCOUNTER
If patient were to dose as scheduled, it would likely not be covered by insurance.  recommendation is to wait for insurance approval, unless deemed medically necessary by provider.    Prior Authorization required. This is off-label or against insurance policy. This may take 10-14 business days.

## 2018-08-20 NOTE — TELEPHONE ENCOUNTER
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 n/a        If yes please call [phone] for further instructions about sending that information

## 2018-08-21 NOTE — TELEPHONE ENCOUNTER
PA denied due to off-label use (not FDA or non-FDA use). Per insurance, the patient was mailed a letter with appeal rights.

## 2018-08-22 NOTE — TELEPHONE ENCOUNTER
Prior authorization denied. Appeal in process, which can be a lengthy process.  will report back when response received.

## 2018-08-24 NOTE — TELEPHONE ENCOUNTER
[facility] Call Center    Phone Message    May a detailed message be left on voicemail: yes    Reason for Call: Other: Gwendolyn calling from [facility], [phone]. She said they will be denying this appeal if they don't get a call back within the next hour. The appeal was expedited to them today and needed to be returned today. They are asking if they can have an extension, they really think another outside reviewer needs to take a look at it. Please call back ASAP to discuss, if no extension granted they will have to deny. Thank you.     Action Taken: Message routed to:  [facility] ([facility] Neurology

## 2018-08-29 ENCOUNTER — TELEPHONE (OUTPATIENT)
Dept: NEUROLOGY | Facility: CLINIC | Age: 28
End: 2018-08-29

## 2018-08-29 NOTE — TELEPHONE ENCOUNTER
Update on Rituxan appeal. We have sent in Hep B status which was requested from insurance, letter of medical necessity, and appeal form. I called and left a VM for the nurse reviewer with insurance today for a status check on appeal.     We will send an update once available.      Erin Angelo  Infusion   [facility]  [address] [place] [id number]  Phone [phone] Fax [phone]  [email or web address]   [email or web address]

## 2018-08-30 NOTE — TELEPHONE ENCOUNTER
Rituximab appeal denied by insurance company with outside neurologist review; Dr. Edouard, would you like our infusion finance team to start working to see if she would be eligible for free drug? Thank you.    Rachel Awad, MS RN Care Coordinator

## 2018-08-30 NOTE — TELEPHONE ENCOUNTER
Hi    Can we start that. Can we tray and get a peer to peer. Also any updates on the letter.    Thanks    Gurinder Edouard MD [facility]  Staff Neurologist   08/30/18

## 2018-08-31 ENCOUNTER — TELEPHONE (OUTPATIENT)
Dept: NEUROLOGY | Facility: CLINIC | Age: 28
End: 2018-08-31

## 2018-08-31 NOTE — TELEPHONE ENCOUNTER
Spoke to Gwendolyn from insurance and she will check if a peer to peer is an option. We should hear back by the beginning of next week. I have emailed the free drug application to you both and to the pt so we could start working on that.

## 2018-08-31 NOTE — TELEPHONE ENCOUNTER
Social Work Follow-Up  [facility] [place]    Data/Intervention:  Patient Name:  Bobby Howard  /Age:  1990 (27 year old)    Reason for Follow-Up:  Services for pt    Collaborated With:    -[facility] PMAP  -[facility]  -Daylin Clarke, Pt's caregiver  - for [employer] [facility] conservatorship division    Intervention/Education/Resources Provided:  Chart further reviewed and discussed w/Daylin Clarke Pt's caregiver and SO of Pt's Father Alex. [facility] doesn't provide Case Mgmt for her insurance type. Referral than made to [facility] [phone]. Her team will determine if she meets criteria for services and contact Pt/family. Some of the possible services would be mental health case management and Waiver services. I have also left a message with [facility] to discuss guardian/conservator needs/issues and will await their return call. The primary question is of help with cost/process.                  Assessment/Plan:  F/u when responses come from [facility] and [facility].     Previously provided patient/family with writer's contact information and availability.       DONA Jade, [facility]    [facility]  [phone]    2018:

## 2018-08-31 NOTE — TELEPHONE ENCOUNTER
Social Work Intervention  [facility]    Data/Intervention:    Patient Name:  Bobby Howard  /Age:  1990 (27 year old)    Visit Type: telephone  Referral Source: Rachel Awad RN  Reason for Referral:  Questions of disability    Collaborated With:    -Daylin, the SO of Pt's father Alex Harp (verbal auth to talk with her obtained from Rachel Awad RN)  -Rachel Awad RN    Patient Concerns/Issues:   Request from Rachel Awad RN to talk with Pt's caregiver and Father's SO Daylin re her many questions. Pt has complex medical and psychosocial history. Daylin reported some of what is in Pt's history with focus on psychosocial issues. She has been in/out of drug rehab and Daylin indicates that due to her vulnerability related to her cognitive status connected with people during rehab that took her on paths away from rehab with the last ending up with her being homeless and living on the streets. She was living in [place] where someone who knows her father alerted him that she was there and he went and picked her up.  Her 3 children ages 10, 6,4 were removed from her custody by child protection and they living in foster care. She has a hearing coming up re the foster care and they need paperwork that indicates what Bobby' medical/cognitive issues are. Rachel is helping to facilitate that information.   The Pt has no source of income. Daylin indicates that she needs almost constant supervision as she can leave and connect with people who are not safe. She has little judgement/insight. See her neuro psych testing results which are concerning.  Daylin indicates she thinks they need to pursue a commitment or something similar. [place] are living [place]. Alex her father is currently working at a [employer] in [place]. Daylin also reports that Bobby' Mother is not involved at all and has a history of drug addiction.    Intervention/Education/Resources Provided:  Discussed that I need to further review Bobby' records  and talk with Rachel Awad, but it appears that commitment would not be the appropriate course as it's short term and is a commitment to treatment which it appears from her neuro psych eval that she wouldn't have the capacity to participate in CD treatment due to her cognitive status. Rather, a petition for guardianship/concervatorship may better serve her needs. She also needs to apply for supplemental security income and when she's officially declared disabled, to be referred to a brain injury waiver program so she can receive services at home or be placed in residential or other care if family cannot care for her.  She is vulnerable and needs 24 hour supervision which Daylin appears to be providing.     Assessment/Plan:  Difficult psychosocial situation. Will review records further, discuss with Rachel Awad and f/u with Daylin. Encouraged Daylin to help Bobby start an ssi application.    Provided patient/family with contact information and availability.    DONA Jade, [facility]    [facility]  [phone]

## 2018-09-04 NOTE — TELEPHONE ENCOUNTER
Rec'd message from [facility]. They have referred Pt to their Mental health care management dept and she will receive a call to discuss services. They are able to assist w/guardianship/conservatorhip. Pt will need to be SMRTed or approved for ssi to move forward with a waiver referral.

## 2018-09-18 DIAGNOSIS — G40.901 STATUS EPILEPTICUS, GENERALIZED CONVULSIVE (H): ICD-10-CM

## 2018-09-18 RX ORDER — LEVETIRACETAM 750 MG/1
1500 TABLET ORAL 2 TIMES DAILY
Qty: 120 TABLET | Refills: 3 | Status: SHIPPED | OUTPATIENT
Start: 2018-09-18 | End: 2018-09-19

## 2018-09-18 NOTE — TELEPHONE ENCOUNTER
"Spoke to Daylin. She has multiple questions. She is wondering where things are at with the rituximab approval. As of 8/31 the PA team stated they were waiting for determination if a peer to peer was possible; also that free drug application had been mailed to the patient. The patient has not received this. Request sent to PA team inquiring about free drug and peer to peer. Asked them to resend application.     Daylin is also questioning a mental health provider. NARCISA aJde was working with family in early September who made a referral to [facility] for this. The patient has not been contacted. I sent a request to Amy to follow up with Tea and the patient re: this.    Daylin is wondering when they need to be seen by Dr. Edouard again. She reports that the patient is \"slow and sluggish\" and \"progressively getting worse\". Per Dr. Edouard's last note in July, he'd like to see the patient in 3 months. Request sent to Clinic Coordinator to contact patient to schedule an appointment in October.     Patient is also in need of a Keppra refill. Dr. Edouard, are you OK with filling this? If so, please approve pended Rx.   "

## 2018-09-18 NOTE — TELEPHONE ENCOUNTER
[facility] Call Center    Phone Message    May a detailed message be left on voicemail: yes    Reason for Call: Other: Daylin called to find out next steps for patient care and also give updates.      Action Taken: Message routed to:  [facility]): Charleen

## 2018-09-19 NOTE — TELEPHONE ENCOUNTER
I emailed the application on 08/31 per the pt's request. I can mail it also to the address listed. I will call as we hadn't heard back from insurance.

## 2018-09-19 NOTE — TELEPHONE ENCOUNTER
I spoke to her insurance and Rituxan was also denied with a third party vendor. They are faxing the denial letter to us as well. The next level of appeal options are for the member to appeal through the state. I am putting the application in the mail.

## 2018-09-25 NOTE — TELEPHONE ENCOUNTER
Left messages with Daylin 9/18/18 and 9/24/18 to contact me if she hasn't heard from [facility]. Will await her return call. Amy

## 2018-10-01 NOTE — TELEPHONE ENCOUNTER
Hi    Seeing me the next day would be fine.    Sincerely  Gurinder Edouard MD [facility]  Staff Neurologist   10/01/18

## 2018-10-01 NOTE — TELEPHONE ENCOUNTER
Patient is scheduled for her Rituximab infusion on 10/22/18 at 11am, with a follow up appointment with Dr. Edouard on 10/23/18 at 2:30pm; I called Daylin to let her know this; She is hoping that the appointment with Dr. Edouard can be moved up in the day so they can start heading back home earlier; I told her that I would need to talk with Dr. Edouard to see if anything like that can be arranged, then I will call her back tomorrow either way.    Rachel Awad, MS RN Care Coordinator

## 2018-10-01 NOTE — TELEPHONE ENCOUNTER
I received notice from our infusion finance team that Bobby has been approved for free drug with Rituximab; I called Daylin to let her know this and verify that she would like me to try to get everything scheduled; Our goal is to get Bobby scheduled for her infusion, then to see Dr. Edouard afterwards; The issue is, is that Dr. Edouard's schedule has limited availability the month of October; Dr. Edouard, is waiting for Bobby to get her Rituximab until 11/6/18? The reason I am asking, is because that is the next date of your schedule that you have late availability, to where she could get the infusion the same day; Daylin said that if the only way it works is for her to have the infusion one day, then see Dr. Edouard the next day, that would be okay too; Dr. Edouard, what do you think? Thank you.    Rachel Awad, MS RN Care Coordinator

## 2018-10-02 NOTE — TELEPHONE ENCOUNTER
I called Daylin back to inform her that I cannot move Bobby's appointment with Dr. Edouard up in the day, as the spot was filled; She verbalized understanding of this; She will let us know if there is anything else they need between now and when we see Bobby in clinic.    Rachel Awad, MS RN Care Coordinator

## 2018-10-05 ENCOUNTER — TELEPHONE (OUTPATIENT)
Dept: NEUROLOGY | Facility: CLINIC | Age: 28
End: 2018-10-05

## 2018-10-05 NOTE — TELEPHONE ENCOUNTER
Dr. Caldera, see message below. This is one of Dr. Edouard's patients. I imagine there is no issue with getting her wisdom teeth out. Please advise. Thanks.

## 2018-10-05 NOTE — TELEPHONE ENCOUNTER
It looks like she is scheduled for rituximab in a few weeks, which is the only thing I see that would complicate the wisdom teeth extraction.  If the  extraction is an urgent, then I would go ahead with that and delay the rituximab until 3-4 weeks after the procedure.  If it's not urgent, I would proceed with the rituximab and hold off on the dental procedure (can check with Dr. Edouard on his return, with regard to how long to hold off)

## 2018-10-05 NOTE — TELEPHONE ENCOUNTER
Health Call Center    Phone Message    May a detailed message be left on voicemail: yes    Reason for Call: Other: Daylin called in and states that she's trying to set a dentist appt for the Pt to have her [place] Teeth pulled and wanted to know if this is ok with the Dr. Edouard. Please call Daylin back to confirm.     Action Taken: Message routed to:  [facility]):  neurology

## 2018-10-05 NOTE — TELEPHONE ENCOUNTER
Called and spoke with Daylin and informed her of Dr. Caldera's input below (covering for Dr. Edouard). She will opt to delay the wisdom tooth extraction until post rituximab infusion. She will plan to discuss this with Dr. Edouard at their upcoming appointment on 10/23.

## 2018-11-05 ENCOUNTER — INFUSION THERAPY VISIT (OUTPATIENT)
Dept: INFUSION THERAPY | Facility: CLINIC | Age: 28
End: 2018-11-05
Attending: PSYCHIATRY & NEUROLOGY
Payer: COMMERCIAL

## 2018-11-05 VITALS
RESPIRATION RATE: 16 BRPM | BODY MASS INDEX: 23.27 KG/M2 | SYSTOLIC BLOOD PRESSURE: 114 MMHG | TEMPERATURE: 98.2 F | OXYGEN SATURATION: 100 % | DIASTOLIC BLOOD PRESSURE: 72 MMHG | WEIGHT: 129.3 LBS | HEART RATE: 73 BPM

## 2018-11-05 DIAGNOSIS — I77.6 CNS VASCULITIS (H): Primary | ICD-10-CM

## 2018-11-05 PROCEDURE — 96375 TX/PRO/DX INJ NEW DRUG ADDON: CPT

## 2018-11-05 PROCEDURE — 25000132 ZZH RX MED GY IP 250 OP 250 PS 637: Mod: ZF | Performed by: PSYCHIATRY & NEUROLOGY

## 2018-11-05 PROCEDURE — 96415 CHEMO IV INFUSION ADDL HR: CPT

## 2018-11-05 PROCEDURE — 25000128 H RX IP 250 OP 636: Mod: ZF | Performed by: PSYCHIATRY & NEUROLOGY

## 2018-11-05 PROCEDURE — 96413 CHEMO IV INFUSION 1 HR: CPT

## 2018-11-05 RX ORDER — DIPHENHYDRAMINE HCL 25 MG
50 CAPSULE ORAL ONCE
Status: COMPLETED | OUTPATIENT
Start: 2018-11-05 | End: 2018-11-05

## 2018-11-05 RX ORDER — DIPHENHYDRAMINE HCL 25 MG
50 CAPSULE ORAL ONCE
Status: CANCELLED
Start: 2018-11-05

## 2018-11-05 RX ORDER — METHYLPREDNISOLONE SODIUM SUCCINATE 125 MG/2ML
125 INJECTION, POWDER, LYOPHILIZED, FOR SOLUTION INTRAMUSCULAR; INTRAVENOUS ONCE
Status: CANCELLED | OUTPATIENT
Start: 2018-11-05

## 2018-11-05 RX ORDER — METHYLPREDNISOLONE SODIUM SUCCINATE 125 MG/2ML
125 INJECTION, POWDER, LYOPHILIZED, FOR SOLUTION INTRAMUSCULAR; INTRAVENOUS ONCE
Status: COMPLETED | OUTPATIENT
Start: 2018-11-05 | End: 2018-11-05

## 2018-11-05 RX ORDER — ACETAMINOPHEN 325 MG/1
650 TABLET ORAL ONCE
Status: CANCELLED
Start: 2018-11-05

## 2018-11-05 RX ORDER — ACETAMINOPHEN 325 MG/1
650 TABLET ORAL ONCE
Status: COMPLETED | OUTPATIENT
Start: 2018-11-05 | End: 2018-11-05

## 2018-11-05 RX ADMIN — METHYLPREDNISOLONE SODIUM SUCCINATE 125 MG: 125 INJECTION, POWDER, FOR SOLUTION INTRAMUSCULAR; INTRAVENOUS at 11:11

## 2018-11-05 RX ADMIN — RITUXIMAB 1000 MG: 10 INJECTION, SOLUTION INTRAVENOUS at 11:48

## 2018-11-05 RX ADMIN — ACETAMINOPHEN 650 MG: 325 TABLET ORAL at 11:10

## 2018-11-05 RX ADMIN — DIPHENHYDRAMINE HYDROCHLORIDE 50 MG: 25 CAPSULE ORAL at 11:10

## 2018-11-05 NOTE — MR AVS SNAPSHOT
After Visit Summary   11/5/2018    Bobby Howard    MRN: [id number]           Patient Information     Date Of Birth          1990        Visit Information        Provider Department      11/5/2018 11:00 AM UC [facility] ATC; [facility] SPEC INFUSION [facility]        Today's Diagnoses     CNS vasculitis (H)    -  1      Care Instructions    EDUCATION POST BIOLOGICAL/CHEMOTHERAPY INFUSION  Call the triage nurse at your clinic or seek medical attention if you have chills and/or temperature greater than or equal to 100.5, uncontrolled nausea/vomiting, diarrhea, constipation, dizziness, shortness of breath, chest pain, heart palpitations, weakness or any other new or concerning symptoms, questions or concerns.  You can not have any live virus vaccines prior to or during treatment or up to 6 months post infusion.  If you have an upcoming surgery, medical procedure or dental procedure during treatment, this should be discussed with your ordering physician and your surgeon/dentist.  If you are having any concerning symptom, if you are unsure if you should get your next infusion or wish to speak to a provider before your next infusion, please call your care coordinator or triage nurse at your clinic to notify them so we can adequately serve you.      Rituximab Solution for injection  What is this medicine?  RITUXIMAB (ri TUX i mab) is a monoclonal antibody. This medicine changes the way the body's immune system works. It is used commonly to treat non-Hodgkin lymphoma and other conditions. In cancer cells, this drug targets a specific protein within cancer cells and stops the cancer cells from growing. It is also used to treat rheumatoid arthritis (RA). In RA, this medicine slows the inflammatory process and help reduce joint pain and swelling. This medicine is often used with other cancer or arthritis medications.  This medicine may be used for other purposes; ask your  health care provider or pharmacist if you have questions.  What should I tell my health care provider before I take this medicine?  They need to know if you have any of these conditions:    blood disorders    heart disease    history of hepatitis B    infection (especially a virus infection such as chickenpox, cold sores, or herpes)    irregular heartbeat    kidney disease    lung or breathing disease, like asthma    lupus    an unusual or allergic reaction to rituximab, mouse proteins, other medicines, foods, dyes, or preservatives    pregnant or trying to get pregnant    breast-feeding  How should I use this medicine?  This medicine is for infusion into a vein. It is administered in a hospital or clinic by a specially trained health care professional.  A special MedGuide will be given to you by the pharmacist with each prescription and refill. Be sure to read this information carefully each time.  Talk to your pediatrician regarding the use of this medicine in children. This medicine is not approved for use in children.  Overdosage: If you think you have taken too much of this medicine contact a poison control center or emergency room at once.  NOTE: This medicine is only for you. Do not share this medicine with others.  What if I miss a dose?  It is important not to miss a dose. Call your doctor or health care professional if you are unable to keep an appointment.  What may interact with this medicine?    cisplatin    medicines for blood pressure    some other medicines for arthritis    vaccines  This list may not describe all possible interactions. Give your health care provider a list of all the medicines, herbs, non-prescription drugs, or dietary supplements you use. Also tell them if you smoke, drink alcohol, or use illegal drugs. Some items may interact with your medicine.  What should I watch for while using this medicine?  Report any side effects that you notice during your treatment right away, such as  changes in your breathing, fever, chills, dizziness or lightheadedness. These effects are more common with the first dose.  Visit your prescriber or health care professional for checks on your progress. You will need to have regular blood work. Report any other side effects. The side effects of this medicine can continue after you finish your treatment. Continue your course of treatment even though you feel ill unless your doctor tells you to stop.  Call your doctor or health care professional for advice if you get a fever, chills or sore throat, or other symptoms of a cold or flu. Do not treat yourself. This drug decreases your body's ability to fight infections. Try to avoid being around people who are sick.  This medicine may increase your risk to bruise or bleed. Call your doctor or health care professional if you notice any unusual bleeding.  Be careful brushing and flossing your teeth or using a toothpick because you may get an infection or bleed more easily. If you have any dental work done, tell your dentist you are receiving this medicine.  Avoid taking products that contain aspirin, acetaminophen, ibuprofen, naproxen, or ketoprofen unless instructed by your doctor. These medicines may hide a fever.  Do not become pregnant while taking this medicine. Women should inform their doctor if they wish to become pregnant or think they might be pregnant. There is a potential for serious side effects to an unborn child. Talk to your health care professional or pharmacist for more information. Do not breast-feed an infant while taking this medicine.  What side effects may I notice from receiving this medicine?  Side effects that you should report to your doctor or health care professional as soon as possible:    allergic reactions like skin rash, itching or hives, swelling of the face, lips, or tongue    low blood counts - this medicine may decrease the number of white blood cells, red blood cells and platelets. You  may be at increased risk for infections and bleeding.    signs of infection - fever or chills, cough, sore throat, pain or difficulty passing urine    signs of decreased platelets or bleeding - bruising, pinpoint red spots on the skin, black, tarry stools, blood in the urine    signs of decreased red blood cells - unusually weak or tired, fainting spells, lightheadedness    breathing problems    confused, not responsive    chest pain    fast, irregular heartbeat    feeling faint or lightheaded, falls    mouth sores    redness, blistering, peeling or loosening of the skin, including inside the mouth    stomach pain    swelling of the ankles, feet, or hands    trouble passing urine or change in the amount of urine  Side effects that usually do not require medical attention (report to your doctor or other health care professional if they continue or are bothersome):    anxiety    headache    loss of appetite    muscle aches    nausea    night sweats  This list may not describe all possible side effects. Call your doctor for medical advice about side effects. You may report side effects to FDA at 4-606-XPY-3426.  Where should I keep my medicine?  This drug is given in a hospital or clinic and will not be stored at home.  NOTE:This sheet is a summary. It may not cover all possible information. If you have questions about this medicine, talk to your doctor, pharmacist, or health care provider. Copyright  2016 Gold Standard                Follow-ups after your visit        Your next 10 appointments already scheduled     Nov 06, 2018  1:00 PM CST   (Arrive by 12:45 PM)   Return Multiple Sclerosis with Gurinder Edouard MD   [facility] Multiple Sclerosis ([facility])    [address] [place] [address]  [address] [place] [id number] [phone]              Who to contact     If you have questions or need follow up information about today's clinic visit or your schedule please contact [facility] directly at [phone].  Normal or non-critical lab and imaging results will be communicated to you by MyChart, letter or phone within 4 business days after the clinic has received the results. If you do not hear from us within 7 days, please contact the clinic through [employer] or phone. If you have a critical or abnormal lab result, we will notify you by phone as soon as possible.  Submit refill requests through [employer] or call your pharmacy and they will forward the refill request to us. Please allow 3 business days for your refill to be completed.          Additional Information About Your Visit        [employer]Leap Information     [employer] lets you send messages to your doctor, view your test results, renew your prescriptions, schedule appointments and more. To sign up, go to [email or web address] . Click on \"Log in\" on the left side of the screen, which will take you to the Welcome page. Then click on \"Sign up Now\" on the right side of the page.     You will be asked to enter the access code listed below, as well as some personal information. Please follow the directions to create your username and password.     Your access code is: [email or web address]  Expires: 2019  5:30 AM     Your access code will  in 90 days. If you need help or a new code, please call your [facility] clinic or [phone].        Care EveryWhere ID     This is your Care EveryWhere ID. This could be used by other organizations to access your [facility] medical records  [id number]        Your Vitals Were     Pulse Temperature Respirations Pulse Oximetry BMI (Body Mass Index)       93 98.3  F (36.8  C) (Oral) 16 100% 23.27 kg/m2        Blood Pressure from Last 3 Encounters:   18 123/76   18 104/68   18 104/68    Weight from Last 3 Encounters:   18 58.7 kg (129 lb 4.8 oz)   18 60 kg (132 lb 3.2 oz)   18 64.3 kg (141 lb 12.8 oz)              Today, you had the " following     No orders found for display       Primary Care Provider Office Phone # Fax #    [facility] Fausto Dotson [phone]       [address] [facility] [address]        Equal Access to Services     RADHA RESENDEZ : Hadbalaji dick barreto candelarioo Sogabbyali, waaxda luqadaha, qaybta kaalmada adeegyada, shana gamble sara pichardo. [facility] [phone].    ATENCIÓN: Si habla español, tiene a hairston disposición servicios gratuitos de asistencia lingüística. Llame al [phone].    We comply with applicable federal civil rights laws and [place] laws. We do not discriminate on the basis of race, color, national origin, age, disability, sex, sexual orientation, or gender identity.            Thank you!     Thank you for choosing [facility] PROCED[facility]  for your care. Our goal is always to provide you with excellent care. Hearing back from our patients is one way we can continue to improve our services. Please take a few minutes to complete the written survey that you may receive in the mail after your visit with us. Thank you!             Your Updated Medication List - Protect others around you: Learn how to safely use, store and throw away your medicines at www.disposemymeds.org.          This list is accurate as of 11/5/18  3:54 PM.  Always use your most recent med list.                   Brand Name Dispense Instructions for use Diagnosis    etonogestrel 68 MG Impl    IMPLANON/NEXPLANON     Inject 68 mg Subcutaneous        folic acid 1 MG tablet    FOLVITE    60 tablet    Take 1 tablet (1 mg) by mouth daily    Polysubstance dependence including opioid type drug with complication, continuous use (H)       levETIRAcetam 750 MG tablet    KEPPRA    120 tablet    Take 2 tablets (1,500 mg) by mouth 2 times daily    Status epilepticus, generalized convulsive (H)       melatonin 3 MG tablet     60 tablet    1-2 tablets (3-6 mg) by Oral or Feeding Tube route At  Bedtime    Persistent disorder of initiating or maintaining sleep       multivitamin, therapeutic Tabs tablet     60 tablet    Take 1 tablet by mouth daily    Polysubstance dependence including opioid type drug with complication, continuous use (H)       thiamine 100 MG tablet     60 tablet    Take 1 tablet (100 mg) by mouth daily    Polysubstance dependence including opioid type drug with complication, continuous use (H)

## 2018-11-05 NOTE — PROGRESS NOTES
Infusion nursing note:    Bobby Howard presents today to [facility] for a Rituxan infusion.  During today's [facility] appointment orders from Dr. Gurinder Edouard were completed.  Dose # once    Progress note:  ID verified by name and .  Assessment completed.  Vitals were stable throughout time in [facility].  Verbal and written education given to patient/representative regarding infusion and possible side effects.  Patient/representative verbalized understanding.    ~~~ NOTE: If the patient answers yes to any of the questions below, hold the infusion and contact ordering provider or on-call provider.    1. Have you recently had an elevated temperature, fever, chills, productive cough, coughing for 3 weeks or longer or hemoptysis,  abnormal vital signs, night sweats,  chest pain or have you noticed a decrease in your appetite, unexplained weight loss or fatigue? No  2. Do you have any open wounds or new incisions? No  3. Do you have any recent or upcoming hospitalizations, surgeries or dental procedures? No  4. Do you currently have or recently have had any signs of illness or infection or are you on any antibiotics? No  5. Have you had any new, sudden or worsening abdominal pain? No  6. Have you or anyone in your household received a live vaccination in the past 4 weeks? Please note:  No live vaccines while on biologic/chemotherapy until 6 months after the last treatment.  Patient can receive the flu vaccine (shot only) and the pneumovax.  It is optimal for the patient to get these vaccines mid cycle, but they can be given at any time as long as it is not on the day of the infusion. No  7. Have you recently been diagnosed with any new nervous system diseases (ie. Multiple sclerosis, Guillain [place], seizures, neurological changes) or cancer diagnosis? Are you on any form of radiation or chemotherapy? No  8. Are you pregnant or breast feeding or do you have plans of pregnancy in the future? No  9. Have you been having any signs of  worsening depression or suicidal ideations?  (benlysta only) No  10. Have there been any other new onset medical symptoms? No    Note: Premeds given as ordered.   Infusion started at 50ml/hr and increased by 50ml every 30 minutes to a max of 400ml/hr.  Administrations This Visit     acetaminophen (TYLENOL) tablet 650 mg     Admin Date Action Dose Route Administered By             11/05/2018 Given 650 mg Oral Em Cohen RN                    diphenhydrAMINE (BENADRYL) capsule 50 mg     Admin Date Action Dose Route Administered By             11/05/2018 Given 50 mg Oral Em Cohen RN                    methylPREDNISolone sodium succinate (solu-MEDROL) injection 125 mg     Admin Date Action Dose Route Administered By             11/05/2018 Given 125 mg Intravenous Em Cohen RN                          /76  Pulse 72  Temp 97.4  F (36.3  C) (Oral)  Resp 16  Wt 58.7 kg (129 lb 4.8 oz)  SpO2 100%  BMI 23.27 kg/m2    Infusion given over approximately  4 hours     Discharge Plan:  Reviewed with patient.  Given biologic medication or medication hand-out. Inform patient if any fever, chills or signs of infection, new symptoms, abdominal pain, heart palpitations, shortness of breath, reaction, weakness, neurological changes, seek medical attention immediately and should not receive infusions. No live virus vaccines prior to or during treatment or up to 6 months post infusion. If the patient has an upcoming procedure or surgery, this should be discussed with the rheumatologist and surgeon or provider.    Discharge instructions were reviewed with patient Yes  Patient/representative verbalized understanding of discharge instructions and all questions answered Yes.    Discharged from [facility] at 1610 with father to home.    Em Cohen

## 2018-11-05 NOTE — PATIENT INSTRUCTIONS
EDUCATION POST BIOLOGICAL/CHEMOTHERAPY INFUSION  Call the triage nurse at your clinic or seek medical attention if you have chills and/or temperature greater than or equal to 100.5, uncontrolled nausea/vomiting, diarrhea, constipation, dizziness, shortness of breath, chest pain, heart palpitations, weakness or any other new or concerning symptoms, questions or concerns.  You can not have any live virus vaccines prior to or during treatment or up to 6 months post infusion.  If you have an upcoming surgery, medical procedure or dental procedure during treatment, this should be discussed with your ordering physician and your surgeon/dentist.  If you are having any concerning symptom, if you are unsure if you should get your next infusion or wish to speak to a provider before your next infusion, please call your care coordinator or triage nurse at your clinic to notify them so we can adequately serve you.      Rituximab Solution for injection  What is this medicine?  RITUXIMAB (ri TUX i mab) is a monoclonal antibody. This medicine changes the way the body's immune system works. It is used commonly to treat non-Hodgkin lymphoma and other conditions. In cancer cells, this drug targets a specific protein within cancer cells and stops the cancer cells from growing. It is also used to treat rheumatoid arthritis (RA). In RA, this medicine slows the inflammatory process and help reduce joint pain and swelling. This medicine is often used with other cancer or arthritis medications.  This medicine may be used for other purposes; ask your health care provider or pharmacist if you have questions.  What should I tell my health care provider before I take this medicine?  They need to know if you have any of these conditions:    blood disorders    heart disease    history of hepatitis B    infection (especially a virus infection such as chickenpox, cold sores, or herpes)    irregular heartbeat    kidney disease    lung or breathing  disease, like asthma    lupus    an unusual or allergic reaction to rituximab, mouse proteins, other medicines, foods, dyes, or preservatives    pregnant or trying to get pregnant    breast-feeding  How should I use this medicine?  This medicine is for infusion into a vein. It is administered in a hospital or clinic by a specially trained health care professional.  A special MedGuide will be given to you by the pharmacist with each prescription and refill. Be sure to read this information carefully each time.  Talk to your pediatrician regarding the use of this medicine in children. This medicine is not approved for use in children.  Overdosage: If you think you have taken too much of this medicine contact a poison control center or emergency room at once.  NOTE: This medicine is only for you. Do not share this medicine with others.  What if I miss a dose?  It is important not to miss a dose. Call your doctor or health care professional if you are unable to keep an appointment.  What may interact with this medicine?    cisplatin    medicines for blood pressure    some other medicines for arthritis    vaccines  This list may not describe all possible interactions. Give your health care provider a list of all the medicines, herbs, non-prescription drugs, or dietary supplements you use. Also tell them if you smoke, drink alcohol, or use illegal drugs. Some items may interact with your medicine.  What should I watch for while using this medicine?  Report any side effects that you notice during your treatment right away, such as changes in your breathing, fever, chills, dizziness or lightheadedness. These effects are more common with the first dose.  Visit your prescriber or health care professional for checks on your progress. You will need to have regular blood work. Report any other side effects. The side effects of this medicine can continue after you finish your treatment. Continue your course of treatment even  though you feel ill unless your doctor tells you to stop.  Call your doctor or health care professional for advice if you get a fever, chills or sore throat, or other symptoms of a cold or flu. Do not treat yourself. This drug decreases your body's ability to fight infections. Try to avoid being around people who are sick.  This medicine may increase your risk to bruise or bleed. Call your doctor or health care professional if you notice any unusual bleeding.  Be careful brushing and flossing your teeth or using a toothpick because you may get an infection or bleed more easily. If you have any dental work done, tell your dentist you are receiving this medicine.  Avoid taking products that contain aspirin, acetaminophen, ibuprofen, naproxen, or ketoprofen unless instructed by your doctor. These medicines may hide a fever.  Do not become pregnant while taking this medicine. Women should inform their doctor if they wish to become pregnant or think they might be pregnant. There is a potential for serious side effects to an unborn child. Talk to your health care professional or pharmacist for more information. Do not breast-feed an infant while taking this medicine.  What side effects may I notice from receiving this medicine?  Side effects that you should report to your doctor or health care professional as soon as possible:    allergic reactions like skin rash, itching or hives, swelling of the face, lips, or tongue    low blood counts - this medicine may decrease the number of white blood cells, red blood cells and platelets. You may be at increased risk for infections and bleeding.    signs of infection - fever or chills, cough, sore throat, pain or difficulty passing urine    signs of decreased platelets or bleeding - bruising, pinpoint red spots on the skin, black, tarry stools, blood in the urine    signs of decreased red blood cells - unusually weak or tired, fainting spells, lightheadedness    breathing  problems    confused, not responsive    chest pain    fast, irregular heartbeat    feeling faint or lightheaded, falls    mouth sores    redness, blistering, peeling or loosening of the skin, including inside the mouth    stomach pain    swelling of the ankles, feet, or hands    trouble passing urine or change in the amount of urine  Side effects that usually do not require medical attention (report to your doctor or other health care professional if they continue or are bothersome):    anxiety    headache    loss of appetite    muscle aches    nausea    night sweats  This list may not describe all possible side effects. Call your doctor for medical advice about side effects. You may report side effects to FDA at 4-946-FDA-1326.  Where should I keep my medicine?  This drug is given in a hospital or clinic and will not be stored at home.  NOTE:This sheet is a summary. It may not cover all possible information. If you have questions about this medicine, talk to your doctor, pharmacist, or health care provider. Copyright  2016 Gold Standard

## 2018-11-06 ENCOUNTER — OFFICE VISIT (OUTPATIENT)
Dept: NEUROLOGY | Facility: CLINIC | Age: 28
End: 2018-11-06
Attending: PSYCHIATRY & NEUROLOGY
Payer: COMMERCIAL

## 2018-11-06 VITALS
DIASTOLIC BLOOD PRESSURE: 79 MMHG | BODY MASS INDEX: 23.41 KG/M2 | HEART RATE: 83 BPM | HEIGHT: 63 IN | SYSTOLIC BLOOD PRESSURE: 119 MMHG | WEIGHT: 132.1 LBS

## 2018-11-06 DIAGNOSIS — G35 MS (MULTIPLE SCLEROSIS) (H): ICD-10-CM

## 2018-11-06 DIAGNOSIS — G40.901 NONINTRACTABLE EPILEPSY WITH STATUS EPILEPTICUS, UNSPECIFIED EPILEPSY TYPE (H): ICD-10-CM

## 2018-11-06 DIAGNOSIS — G35 MS (MULTIPLE SCLEROSIS) (H): Primary | ICD-10-CM

## 2018-11-06 LAB
ALBUMIN SERPL-MCNC: 3.8 G/DL (ref 3.4–5)
ALP SERPL-CCNC: 74 U/L (ref 40–150)
ALT SERPL W P-5'-P-CCNC: 16 U/L (ref 0–50)
ANION GAP SERPL CALCULATED.3IONS-SCNC: 9 MMOL/L (ref 3–14)
AST SERPL W P-5'-P-CCNC: 9 U/L (ref 0–45)
BASOPHILS # BLD AUTO: 0 10E9/L (ref 0–0.2)
BASOPHILS NFR BLD AUTO: 0.3 %
BILIRUB SERPL-MCNC: 0.2 MG/DL (ref 0.2–1.3)
BUN SERPL-MCNC: 11 MG/DL (ref 7–30)
CALCIUM SERPL-MCNC: 8.5 MG/DL (ref 8.5–10.1)
CHLORIDE SERPL-SCNC: 112 MMOL/L (ref 94–109)
CO2 SERPL-SCNC: 23 MMOL/L (ref 20–32)
CREAT SERPL-MCNC: 0.72 MG/DL (ref 0.52–1.04)
DIFFERENTIAL METHOD BLD: NORMAL
EOSINOPHIL # BLD AUTO: 0.1 10E9/L (ref 0–0.7)
EOSINOPHIL NFR BLD AUTO: 1.5 %
ERYTHROCYTE [DISTWIDTH] IN BLOOD BY AUTOMATED COUNT: 11.5 % (ref 10–15)
GFR SERPL CREATININE-BSD FRML MDRD: >90 ML/MIN/1.7M2
GLUCOSE SERPL-MCNC: 95 MG/DL (ref 70–99)
HCT VFR BLD AUTO: 36.6 % (ref 35–47)
HGB BLD-MCNC: 12.5 G/DL (ref 11.7–15.7)
IMM GRANULOCYTES # BLD: 0 10E9/L (ref 0–0.4)
IMM GRANULOCYTES NFR BLD: 0.3 %
LYMPHOCYTES # BLD AUTO: 1.3 10E9/L (ref 0.8–5.3)
LYMPHOCYTES NFR BLD AUTO: 14.7 %
MCH RBC QN AUTO: 32.3 PG (ref 26.5–33)
MCHC RBC AUTO-ENTMCNC: 34.2 G/DL (ref 31.5–36.5)
MCV RBC AUTO: 95 FL (ref 78–100)
MONOCYTES # BLD AUTO: 0.7 10E9/L (ref 0–1.3)
MONOCYTES NFR BLD AUTO: 7.4 %
NEUTROPHILS # BLD AUTO: 6.7 10E9/L (ref 1.6–8.3)
NEUTROPHILS NFR BLD AUTO: 75.8 %
NRBC # BLD AUTO: 0 10*3/UL
NRBC BLD AUTO-RTO: 0 /100
PLATELET # BLD AUTO: 189 10E9/L (ref 150–450)
POTASSIUM SERPL-SCNC: 3.2 MMOL/L (ref 3.4–5.3)
PROT SERPL-MCNC: 6.9 G/DL (ref 6.8–8.8)
RBC # BLD AUTO: 3.87 10E12/L (ref 3.8–5.2)
SODIUM SERPL-SCNC: 145 MMOL/L (ref 133–144)
WBC # BLD AUTO: 8.9 10E9/L (ref 4–11)

## 2018-11-06 PROCEDURE — 80053 COMPREHEN METABOLIC PANEL: CPT | Performed by: PSYCHIATRY & NEUROLOGY

## 2018-11-06 PROCEDURE — 36415 COLL VENOUS BLD VENIPUNCTURE: CPT | Performed by: PSYCHIATRY & NEUROLOGY

## 2018-11-06 PROCEDURE — G0463 HOSPITAL OUTPT CLINIC VISIT: HCPCS

## 2018-11-06 PROCEDURE — 85025 COMPLETE CBC W/AUTO DIFF WBC: CPT | Performed by: PSYCHIATRY & NEUROLOGY

## 2018-11-06 RX ORDER — LAMOTRIGINE 25-50-100
KIT ORAL
Qty: 1 KIT | Refills: 1 | Status: SHIPPED | OUTPATIENT
Start: 2018-11-06 | End: 2018-12-20

## 2018-11-06 ASSESSMENT — PAIN SCALES - GENERAL: PAINLEVEL: NO PAIN (0)

## 2018-11-06 NOTE — MR AVS SNAPSHOT
After Visit Summary   11/6/2018    Bobby Howard    MRN: [id number]           Patient Information     Date Of Birth          1990        Visit Information        Provider Department      11/6/2018 1:00 PM Gurinder Edouard MD [facility] Multiple Sclerosis        Today's Diagnoses     MS (multiple sclerosis) (H)    -  1    Nonintractable epilepsy with status epilepticus, unspecified epilepsy type (H)          Care Instructions    Will start lamictal Initial: Weeks 1 and 2: 25 mg once daily; Weeks 3 and 4: 50 mg once daily Week 6: 150 mg once daily; Week 7: 200 mg once daily      Will check blood work today.    Will need to repeat blood work in 1 month and three months    Will follow up in 6 months with a MRI    You saw a neurology provider today at the [facility].  You may have also met with the MS RN Care Coordinator.  In order to get a message to your MS Center provider, you should contact [phone] option 3 for the triage nurse line.    You should contact us via this protocol if you have any of the following symptoms:    New or worsening neurologic symptoms that persist for 24-48 hours, such as:  o New onset of pain or marked worsening of pain  o Difficulty with speech, swallowing, or breathing  o New onset of vertigo or dizziness  o Change in bowel or bladder function (incontinence, difficulty urinating)    Increasing difficulty in self care    Marked changes in vision (double vision, blurred vision, graying of vision)    Change in mobility    Change in cognitive function    Falling    Worsening numbness, tingling or pain with a change in function    Worsening fatigue lasting more than 2 weeks  If you had labs completed today, we will contact you with the results.  If you are active in [employer], they will be released to you there.  Otherwise, your results will be provided to you via mail or telephone call.  Some results take up to 2 weeks for  completion.  If you haven t heard anything about your lab results within 2 weeks, you can call or send a [employer] message to obtain your results.  If you have an MRI scheduled in the week or two prior to your next appointment, we will go over the results at your scheduled follow up appointment.  If you are not scheduled to see your MS Center provider within about 2 weeks after your MRI, please call or send a [employer] message to obtain your results if you haven t heard anything within 2 weeks.  Please be aware that it takes at least 5 business days after routine MRIs for your results to be reviewed by both the radiologist and your doctor.  MRIs completed at facilities outside of the [facility] system take about 2 weeks in order for the MRI disc to be mailed to our clinic and uploaded into your medical record.    Prescription refills should be faxed to us by your pharmacy.  Our fax number for prescription refills is [phone].  Please do your best to come to your appointments, and to arrive 15 minutes early to allow time for checking in.  [facility] reserves the right to terminate care of established patients if a patient misses three or more appointments in a clinic without providing notification within a 12-month period.    Developing Your Care Team  Individuals living with chronic illnesses like MS may be unaware that they are at risk for the same range of medical problems as everyone else.  This is why you must establish a relationship with other health care providers in addition to your Multiple Sclerosis doctor.  It can be difficult at times to figure out whether a health concern is related to your MS, or whether it is related to something else, such as hormonal changes, pseduoexacerbations, changes in your core body temperature, flu-like reactions to interferons, exercise, or infections.  Urinary tract infections (UTIs) are common culprits that can cause fatigue, weakness, or other  MS  attack -like symptoms without classic symptoms of a UTI.  For this reason, if you call or come in to discuss symptoms, you may be asked to get in touch with your primary provider or another specialist, so that you receive the comprehensive care you need.  What is Multiple Sclerosis (MS)?  MS is a disease in which the nerve tissues in the brain and/or spinal cord are attacked by immune cells in the body.  These immune cells are present in everyone, and their normal role is to fight off infections.  In people with MS, these cells change the way they function and cross into the nervous system.  Once there, they cause inflammation that damages the myelin (or the protective coating of a nerve cell, much like the plastic covering on an electrical cord) and parts of the nerve cell itself.  So far, a clear cause for this immune system dysfunction has not been found.  MS often starts out as the  relapsing-remitting  form.  This means there are episodes when you have symptoms, and other times when you recover to normal or near-normal.  Over time, if the damage to the nervous system continues, the disease can cause additional disability, such as difficulty walking.  If the relapses and nerve damage can be prevented with available medications, many patients with MS can go many years between relapses and have relatively little disability.  Remember: MS is a condition that changes and must be evaluated on an ongoing basis!  What is a Relapse? (Also called flare-ups, attacks, or exacerbations)  Relapses are due to the occurrence of inflammation in some part of the brain and/or spinal cord.  A relapse is new or recurrent symptoms which persist for at least 24 hours and sometimes worsen over 48 hours.  New symptoms need to be  by at least 1 month in order to be considered separate relapses. Most of the time, symptoms reach their maximal intensity within 2 weeks and then begin to slowly resolve.  At times, your symptoms may  not recover fully for up to 6 months, depending on the severity of the episode.  The frequency of relapses is generally higher early in the disease, but can vary greatly among individuals with MS.  Improvement of symptoms for an individual is unpredictable with each relapse.    It is important to remember that an increase in symptoms and changes in function may not necessarily be a relapse.  There are other factors that contribute to such changes, such as hot weather, increased body temperature, infection/illness, stress and sleep deprivation.  The worsening of symptoms may feel like a relapse when in reality it is not.  These episodes are referred to as pseudorelapses.  Once the underlying cause is addressed, symptoms usually fade away and you feel better.  If you experience a worsening of symptoms that lasts more than 48 hours and does not improve with cooling down, decreasing stress, or treatment of an infection, please call us and we can help to better determine whether you are having a pseudorelapse versus a relapse.        Lamotrigine tablets  Brand Names: Lamictal, Subvenite  What is this medicine?  LAMOTRIGINE (la LORNA tri jeen) is used to control seizures in adults and children with epilepsy and Lennox-Gastaut syndrome. It is also used in adults to treat bipolar disorder.  How should I use this medicine?  Take this medicine by mouth with a glass of water. Follow the directions on the prescription label. Do not chew these tablets. If this medicine upsets your stomach, take it with food or milk. Take your doses at regular intervals. Do not take your medicine more often than directed.  A special MedGuide will be given to you by the pharmacist with each new prescription and refill. Be sure to read this information carefully each time.  Talk to your pediatrician regarding the use of this medicine in children. While this drug may be prescribed for children as young as 2 years for selected conditions, precautions  do apply.  What side effects may I notice from receiving this medicine?  Side effects that you should report to your doctor or health care professional as soon as possible:    allergic reactions like skin rash, itching or hives, swelling of the face, lips, or tongue    changes in vision    depressed mood    elevated mood, decreased need for sleep, racing thoughts, impulsive behavior    fever with rash, swollen lymph nodes, or swelling of the face    loss of balance or coordination    mouth sores    redness, blistering, peeling or loosening of the skin, including inside the mouth    right upper belly pain    seizures    severe muscle pain    signs and symptoms of aseptic meningitis such as stiff neck and sensitivity to light, headache, drowsiness, fever, nausea, vomiting, rash    signs of infection - fever or chills, cough, sore throat, pain or difficulty passing urine    suicidal thoughts or other mood changes    swollen lymph nodes    trouble walking    unusual bruising or bleeding    unusually weak or tired    yellowing of the eyes or skin  Side effects that usually do not require medical attention (report to your doctor or health care professional if they continue or are bothersome):    diarrhea    dizziness    dry mouth    stuffy nose    tiredness    tremors    trouble sleeping  What may interact with this medicine?      atazanavir    carbamazepine    female hormones, including contraceptive or birth control pills    lopinavir    methotrexate    phenobarbital    phenytoin    primidone    pyrimethamine    rifampin    ritonavir    trimethoprim    valproic acid  What if I miss a dose?  If you miss a dose, take it as soon as you can. If it is almost time for your next dose, take only that dose. Do not take double or extra doses.  Where should I keep my medicine?  Keep out of reach of children.  Store at room temperature between 15 and 30 degrees C (59 and 86 degrees F). Throw away any unused medicine after the  expiration date.  What should I tell my health care provider before I take this medicine?  They need to know if you have any of these conditions:    aseptic meningitis during prior use of lamotrigine    depression    folate deficiency    kidney disease    liver disease    suicidal thoughts, plans, or attempt; a previous suicide attempt by you or a family member    an unusual or allergic reaction to lamotrigine or other seizure medications, other medicines, foods, dyes, or preservatives    pregnant or trying to get pregnant    breast-feeding  What should I watch for while using this medicine?  Visit your doctor or health care professional for regular checks on your progress. If you take this medicine for seizures, wear a Medic Alert bracelet or necklace. Carry an identification card with information about your condition, medicines, and doctor or health care professional.  It is important to take this medicine exactly as directed. When first starting treatment, your dose will need to be adjusted slowly. It may take weeks or months before your dose is stable. You should contact your doctor or health care professional if your seizures get worse or if you have any new types of seizures. Do not stop taking this medicine unless instructed by your doctor or health care professional. Stopping your medicine suddenly can increase your seizures or their severity.  Contact your doctor or health care professional right away if you develop a rash while taking this medicine. Rashes may be very severe and sometimes require treatment in the hospital. Deaths from rashes have occurred. Serious rashes occur more often in children than adults taking this medicine. It is more common for these serious rashes to occur during the first 2 months of treatment, but a rash can occur at any time.  You may get drowsy, dizzy, or have blurred vision. Do not drive, use machinery, or do anything that needs mental alertness until you know how this  medicine affects you. To reduce dizzy or fainting spells, do not sit or stand up quickly, especially if you are an older patient. Alcohol can increase drowsiness and dizziness. Avoid alcoholic drinks.  If you are taking this medicine for bipolar disorder, it is important to report any changes in your mood to your doctor or health care professional. If your condition gets worse, you get mentally depressed, feel very hyperactive or manic, have difficulty sleeping, or have thoughts of hurting yourself or committing suicide, you need to get help from your health care professional right away. If you are a caregiver for someone taking this medicine for bipolar disorder, you should also report these behavioral changes right away. The use of this medicine may increase the chance of suicidal thoughts or actions. Pay special attention to how you are responding while on this medicine.  Your mouth may get dry. Chewing sugarless gum or sucking hard candy, and drinking plenty of water may help. Contact your doctor if the problem does not go away or is severe.  Women who become pregnant while using this medicine may enroll in the [place] American Antiepileptic Drug Pregnancy Registry by calling 1-710.263.7499. This registry collects information about the safety of antiepileptic drug use during pregnancy.  NOTE:This sheet is a summary. It may not cover all possible information. If you have questions about this medicine, talk to your doctor, pharmacist, or health care provider. Copyright  2018 [employer]                Follow-ups after your visit        Your next 10 appointments already scheduled     Nov 06, 2018  1:50 PM CST   LAB with  LAB    Health Lab ([facility] [place])    [address] [address] [phone]           Please do not eat 10-12 hours before your appointment if you are coming in fasting for labs on lipids, cholesterol, or glucose (sugar). This does not apply to  pregnant women. Water, hot tea and black coffee (with nothing added) are okay. Do not drink other fluids, diet soda or chew gum.            May 08, 2019 12:00 PM CDT   MR BRAIN W/O & W CONTRAST with [address] MRI ([facility])    [address] [address] [phone]           How do I prepare for my exam? (Food and drink instructions) **If you will be receiving sedation or general anesthesia, please see special notes below.**  How do I prepare for my exam? (Other instructions) Take your medicines as usual, unless your doctor tells you not to. You may or may not receive intravenous (IV) contrast for this exam pending the discretion of the Radiologist.  You do not need to do anything special to prepare.  **If you will be receiving sedation or general anesthesia, please see special notes below.**  What should I wear: The MRI machine uses a strong magnet. Please wear clothes without metal (snaps, zippers). A sweatsuit works well, or we may give you a hospital gown. Please remove any body piercings and hair extensions before you arrive. You will also remove watches, jewelry, hairpins, wallets, dentures, partial dental plates and hearing aids. You may wear contact lenses, and you may be able to wear your rings. We have a safe place to keep your personal items, but it is safer to leave them at home.  How long does the exam take: Most tests take 30 to 60 minutes.  HOWEVER, IF YOUR DOCTOR PRESCRIBES ANESTHESIA please plan on spending four to five hours in the recovery room.  What should I bring:  Bring a list of your current medicines to your exam (including vitamins, minerals and over-the-counter drugs).  Do I need a :  **If you will be receiving sedation or general anesthesia, please see special notes below.**  What should I do after the exam: No Restrictions, You may resume normal activities.  What is this test: MRI (magnetic  resonance imaging) uses a strong magnet and radio waves to look inside the body. An MRA (magnetic resonance angiogram) does the same thing, but it lets us look at your blood vessels. A computer turns the radio waves into pictures showing cross sections of the body, much like slices of bread. This helps us see any problems more clearly. You may receive fluid (called  contrast ) before or during your scan. The fluid helps us see the pictures better. We give the fluid through an IV (small needle in your arm).  Who should I call with questions:  Please call the Imaging Department at your exam site with any questions. Directions, parking instructions, and other information is available on our website, [email or web address].  How do I prepare if I m having sedation or anesthesia? **IMPORTANT** THE INSTRUCTIONS BELOW ARE ONLY FOR THOSE PATIENTS WHO HAVE BEEN TOLD THEY WILL RECEIVE SEDATION OR GENERAL ANESTHESIA DURING THEIR MRI PROCEDURE:  IF YOU WILL RECEIVE SEDATION (take medicine to help you relax during your exam): You must get the medicine from your doctor before you arrive. Bring the medicine to the exam. Do not take it at home. Arrive one hour early. Bring someone who can take you home after the test. Your medicine will make you sleepy. After the exam, you may not drive, take a bus or take a taxi by yourself. No eating 8 hours before your exam. You may have clear liquids up until 4 hours before your exam. (Clear liquids include water, clear tea, black coffee and fruit juice without pulp.)  IF YOU WILL RECEIVE ANESTHESIA (be asleep for your exam): Arrive 1 1/2 hours early. Bring someone who can take you home after the test. You may not drive, take a bus or take a taxi by yourself. No eating 8 hours before your exam. You may have clear liquids up until 4 hours before your exam. (Clear liquids include water, clear tea, black coffee and fruit juice without pulp.)            May 08, 2019  1:30 PM CDT   (Arrive by 1:15 PM)  "  Return Multiple Sclerosis with Gurinder Edouard MD   [facility] Multiple Sclerosis ([facility])    [address] [facility]  [address] [place] [address] [phone]              Future tests that were ordered for you today     Open Standing Orders        Priority Remaining Interval Expires Ordered    CBC with platelets differential Routine 4/4 monthyl 11/6/2019 11/6/2018    Comprehensive metabolic panel Routine 4/4 monthyl 11/6/2019 11/6/2018          Open Future Orders        Priority Expected Expires Ordered    MR Brain w/o & w Contrast Routine  11/6/2019 11/6/2018            Who to contact     If you have questions or need follow up information about today's clinic visit or your schedule please contact [facility] directly at [phone].  Normal or non-critical lab and imaging results will be communicated to you by MyChart, letter or phone within 4 business days after the clinic has received the results. If you do not hear from us within 7 days, please contact the clinic through [employer] or phone. If you have a critical or abnormal lab result, we will notify you by phone as soon as possible.  Submit refill requests through [employer] or call your pharmacy and they will forward the refill request to us. Please allow 3 business days for your refill to be completed.          Additional Information About Your Visit        [employer] Information     [employer] lets you send messages to your doctor, view your test results, renew your prescriptions, schedule appointments and more. To sign up, go to [email or web address] . Click on \"Log in\" on the left side of the screen, which will take you to the Welcome page. Then click on \"Sign up Now\" on the right side of the page.     You will be asked to enter the access code listed below, as well as some personal information. Please follow the directions to create your username and password.     Your access code is: " "[email or web address]  Expires: 2019  5:30 AM     Your access code will  in 90 days. If you need help or a new code, please call your [place] [facility] clinic or [phone].        Care EveryWhere ID     This is your Care EveryWhere ID. This could be used by other organizations to access your [facility] medical records  [id number]        Your Vitals Were     Pulse Height BMI (Body Mass Index)             83 1.588 m (5' 2.5\") 23.78 kg/m2          Blood Pressure from Last 3 Encounters:   18 119/79   18 114/72   18 104/68    Weight from Last 3 Encounters:   18 59.9 kg (132 lb 1.6 oz)   18 58.7 kg (129 lb 4.8 oz)   18 60 kg (132 lb 3.2 oz)                 Today's Medication Changes          These changes are accurate as of 18  1:48 PM.  If you have any questions, ask your nurse or doctor.               Start taking these medicines.        Dose/Directions    LamoTRIgine 25 & 50 & 100 MG Kit   Commonly known as:  LAMICTAL ODT   Used for:  MS (multiple sclerosis) (H), Nonintractable epilepsy with status epilepticus, unspecified epilepsy type (H)   Started by:  Gurinder Edouard MD        Initial: Weeks 1 and 2: 25 mg once daily; Weeks 3 and 4: 50 mg once daily. Week 5 take 100mg. Call me at week 6   Quantity:  1 kit   Refills:  1            Where to get your medicines      These medications were sent to [facility] [address] [facility] [address] AT [facility] of  [address]  [address], [facility] [address]    Hours:  24-hours Phone:  [phone]     LamoTRIgine 25 & 50 & 100 MG Kit                Primary Care Provider Office Phone # Fax #    Cash Dotson [phone]       [address] [facility] [address]        Equal Access to Services     RADHA RESENDEZ AH: Mateus Daniel, wamukulda luiza, shana ireland . [facility] [phone].    ATENCIÓN: Si habla " español, tiene a hairston disposición servicios gratuitos de asistencia lingüística. Brendan stein [phone].    We comply with applicable federal civil rights laws and [place] laws. We do not discriminate on the basis of race, color, national origin, age, disability, sex, sexual orientation, or gender identity.            Thank you!     Thank you for choosing [facility]  for your care. Our goal is always to provide you with excellent care. Hearing back from our patients is one way we can continue to improve our services. Please take a few minutes to complete the written survey that you may receive in the mail after your visit with us. Thank you!             Your Updated Medication List - Protect others around you: Learn how to safely use, store and throw away your medicines at www.disposemymeds.org.          This list is accurate as of 11/6/18  1:48 PM.  Always use your most recent med list.                   Brand Name Dispense Instructions for use Diagnosis    etonogestrel 68 MG Impl    IMPLANON/NEXPLANON     Inject 68 mg Subcutaneous        folic acid 1 MG tablet    FOLVITE    60 tablet    Take 1 tablet (1 mg) by mouth daily    Polysubstance dependence including opioid type drug with complication, continuous use (H)       LamoTRIgine 25 & 50 & 100 MG Kit    LAMICTAL ODT    1 kit    Initial: Weeks 1 and 2: 25 mg once daily; Weeks 3 and 4: 50 mg once daily. Week 5 take 100mg. Call me at week 6    MS (multiple sclerosis) (H), Nonintractable epilepsy with status epilepticus, unspecified epilepsy type (H)       levETIRAcetam 750 MG tablet    KEPPRA    120 tablet    Take 2 tablets (1,500 mg) by mouth 2 times daily    Status epilepticus, generalized convulsive (H)       melatonin 3 MG tablet     60 tablet    1-2 tablets (3-6 mg) by Oral or Feeding Tube route At Bedtime    Persistent disorder of initiating or maintaining sleep       multivitamin, therapeutic Tabs tablet     60 tablet    Take 1 tablet by mouth  daily    Polysubstance dependence including opioid type drug with complication, continuous use (H)       thiamine 100 MG tablet     60 tablet    Take 1 tablet (100 mg) by mouth daily    Polysubstance dependence including opioid type drug with complication, continuous use (H)

## 2018-11-06 NOTE — LETTER
2018       RE: Bobby Howard  [address] [place] [address]     Dear Colleague,    Thank you for referring your patient, Bobby Howard, to the [facility] MULTIPLE SCLEROSIS at [facility]. Please see a copy of my visit note below.    MULTIPLE SCLEROSIS CLINIC AT THE [facility]  FOLLOWUP/ESTABLISHED PATIENT VISIT      PRINCIPAL NEUROLOGIC DIAGNOSIS: Deferred      Date of Onset: 2017  Date of Diagnosis: 2017  Initial Clinical Course: Monophasic  Current Clinical Course: Primary Progressive  Past Disease Modifying Therapy(ies): NA  Current Disease Modifying Therapy(ies): NA  Most Recent MRI of the Brain: 3/19/18  Most Recent MRI of the Cervical Cord 18  Most Recent MRI of the Thoracic Cord: 18  Most Recent Lumbar Puncture: 17 + OCBs  Most Recent OCT: NA   Most Recent JCV: NA  Most Recent Remote Hepatitis Panel: 18 negative  Most Recent VZV Ig18 positive  Most Recent TB Quant: 18 positive      CHIEF COMPLAINT: Follow up on DMT      INTERVAL HISTORY:    The patient reports that she has been getting better. The patient's father reports that she is wondering around for a couple of days. The patient reports that she has significant issues with her wondering out. She will have bnurst of anger that make managing her difficult at home.     Issues with current MS therapy: Tolerating DMT without issue    REVIEW OF SYSTEMS:    Mood: unchanged  Spasticity:none  Bladder: none  Bowel: unchanged  Pain related to today's visit:reviewed on nursing intake documentation  Fatigue: unchanged  Sleep: well/ no problem  Memory/Concentration: worse, difficulty with word finding, difficulty with short term memory, difficulty with executive function, getting lost in familiar places and difficulty multitasking       Otherwise 10 point ROS was neg other than the symptoms noted above.    PAST HISTORY was reviewed and updated:      MEDICATIONS and  ALLERGIES were reviewed and updated.    SOCIAL HISTORY was reviewed and updated:    EXAM:    PHYSICAL EXAMINATION:   VITAL SIGNS:  B/P: 119/79, T: Data Unavailable, P: 83, R: Data Unavailable    MENTAL STATUS: Alert,awake and oriented times four. She has poor insight, only remember 1 out 3 with delayed recall. Emotionally liable   CRANIAL NERVES:  Visual fields are full to confrontation. The pupils are  round and react to light and there is no Jovanny Elise pupil.  Extraocular movements are  intact with no  internuclear ophthalmoplegia. No nystagmus. Facial strength and sensation are  normal. Hearing is  normal. Palate elevation and tongue protrusion are  normal.   POWER:      Motor     Upper         Right Left   Shoulder Abduction 5 5   Elbow Flexion 5 5   Elbow Extension 5 5   Wrist Extension 5 5   Digit Extension 5 5   Digit Flexion 5 5   APB 5 5   Tone 0 0   Lower          Right Left   Hip Flexion 5 5   Knee Extension 5 5   Knee Flexion 5 5   Foot Dorsiflexion 5 5   Foot Plantar Flexion 5 5   EH 5 5   Toe Flexion 5 5   Tone 0 0             Grade Description   0 No increase in muscle tone   1 Slight increase in muscle tone, manifested by a catch and release or by minimal resistance at the end of the range of motion when the affected part(s) is moved in flexion or extension   1+ Slight increase in muscle tone, manifested by a catch, followed by minimal resistance throughout the remainder (less than half) of the ROM   2 More marked increase in muscle tone through most of the ROM, but affected part(s) easily moved   3 Considerable increase in muscle tone, passive movement difficult   4 Affected part(s) rigid in flexion or extension               SENSORY:      Light touch:  Intact in all extremities        MOTOR/CEREBELLAR:     Right Left   RRM 0 Normal 0 Normal   DANNY 0 Normal 0 Normal   FTN 0 Normal 0 Normal   RRM 1 Abnormal 0 Normal   HKS 0 Normal 0 Normal               GAIT: Gait is   narrow-based and steady and the  patient is  able to walk on heels, toes and in tandem without difficulty.    Romberg: Stable with eye(s) closed      RESULTS:  Monitoring labs:    Orders Only on 07/31/2018   Component Date Value Ref Range Status     CD19 B Cells 07/31/2018 17  6 - 27 % Final     Absolute CD19 07/31/2018 465  107 - 698 cells/uL Final   Office Visit on 07/31/2018   Component Date Value Ref Range Status     WBC 07/31/2018 7.8  4.0 - 11.0 10e9/L Final     RBC Count 07/31/2018 4.38  3.8 - 5.2 10e12/L Final     Hemoglobin 07/31/2018 14.4  11.7 - 15.7 g/dL Final     Hematocrit 07/31/2018 40.8  35.0 - 47.0 % Final     MCV 07/31/2018 93  78 - 100 fl Final     MCH 07/31/2018 32.9  26.5 - 33.0 pg Final     MCHC 07/31/2018 35.3  31.5 - 36.5 g/dL Final     RDW 07/31/2018 11.1  10.0 - 15.0 % Final     Platelet Count 07/31/2018 271  150 - 450 10e9/L Final     Diff Method 07/31/2018 Automated Method   Final     % Neutrophils 07/31/2018 55.5  % Final     % Lymphocytes 07/31/2018 36.0  % Final     % Monocytes 07/31/2018 6.0  % Final     % Eosinophils 07/31/2018 1.5  % Final     % Basophils 07/31/2018 0.6  % Final     % Immature Granulocytes 07/31/2018 0.4  % Final     Nucleated RBCs 07/31/2018 0  0 /100 Final     Absolute Neutrophil 07/31/2018 4.4  1.6 - 8.3 10e9/L Final     Absolute Lymphocytes 07/31/2018 2.8  0.8 - 5.3 10e9/L Final     Absolute Monocytes 07/31/2018 0.5  0.0 - 1.3 10e9/L Final     Absolute Eosinophils 07/31/2018 0.1  0.0 - 0.7 10e9/L Final     Absolute Basophils 07/31/2018 0.1  0.0 - 0.2 10e9/L Final     Abs Immature Granulocytes 07/31/2018 0.0  0 - 0.4 10e9/L Final     Absolute Nucleated RBC 07/31/2018 0.0   Final     Sodium 07/31/2018 140  133 - 144 mmol/L Final     Potassium 07/31/2018 3.6  3.4 - 5.3 mmol/L Final     Chloride 07/31/2018 106  94 - 109 mmol/L Final     Carbon Dioxide 07/31/2018 26  20 - 32 mmol/L Final     Anion Gap 07/31/2018 8  3 - 14 mmol/L Final     Glucose 07/31/2018 90  70 - 99 mg/dL Final     Urea Nitrogen  07/31/2018 12  7 - 30 mg/dL Final     Creatinine 07/31/2018 0.69  0.52 - 1.04 mg/dL Final     GFR Estimate 07/31/2018 >90  >60 mL/min/1.7m2 Final     GFR Estimate If Black 07/31/2018 >90  >60 mL/min/1.7m2 Final     Calcium 07/31/2018 9.1  8.5 - 10.1 mg/dL Final     Bilirubin Total 07/31/2018 0.4  0.2 - 1.3 mg/dL Final     Albumin 07/31/2018 4.0  3.4 - 5.0 g/dL Final     Protein Total 07/31/2018 8.1  6.8 - 8.8 g/dL Final     Alkaline Phosphatase 07/31/2018 92  40 - 150 U/L Final     ALT 07/31/2018 28  0 - 50 U/L Final     AST 07/31/2018 12  0 - 45 U/L Final     Hepatitis B Core Sonal 07/31/2018 Nonreactive  NR^Nonreactive Final     Hepatitis B Surface Antibody 07/31/2018 0.20  <8.00 m[IU]/mL Final     Hep B Surface Agn 07/31/2018 Nonreactive  NR^Nonreactive Final     HCV RNA Quant IU/ml 07/31/2018 HCV RNA Not Detected  HCVND^HCV RNA Not Detected [IU]/mL Final     Log of HCV RNA Qt 07/31/2018 Not Calculated  <1.2 Log IU/mL Final     HIV Antigen Antibody Combo 07/31/2018 Nonreactive  NR^Nonreactive     Final     Varicella Zoster Virus Antibody IgG 07/31/2018 4.1* 0.0 - 0.8 AI Final     Vitamin B12 07/31/2018 544  193 - 986 pg/mL Final     Vitamin B6 07/31/2018 31.0  20.0 - 125.0 nmol/L Final     Vitamin D Deficiency screening 07/31/2018 34  20 - 75 ug/L Final     Treponema Antibodies 07/31/2018 Nonreactive  NR^Nonreactive Final     Rheumatoid Factor 07/31/2018 <20  <20 IU/mL Final     HCG Qualitative Serum 07/31/2018 Negative  NEG^Negative Final     Quantiferon-TB Gold Plus Result 07/31/2018 Negative  NEG^Negative Final     TB1 Ag minus Nil Value 07/31/2018 0.02  IU/mL Final     TB2 Ag minus Nil Value 07/31/2018 0.02  IU/mL Final     Mitogen minus Nil Result 07/31/2018 >10.00  IU/mL Final     Nil Result 07/31/2018 0.20  IU/mL Final   ]      MRI brain:    no new MRI to review    ASSESSMENT/PLAN:  The patient is a 28-year-old female with a past medical history of autoimmune encephalitis most probably secondary to  significant polysubstance abuse and history of status epilepticus who is presenting today as a follow-up.  I am treating the patient as she has an autoimmune encephalitis given that she continued to develop new lesions outside of her acute illness process.  I am treating the patient with rituximab given that I am uncertain whether or not this truly represents multiple sclerosis or CNS vasculitis.  Treating with rituximab would allow coverage of both conditions.  In regards to the patient's cognitive status, I feel that the patient probably lacks capacity to make medical decisions for herself given her extremely poor insight.  The patient would probably further benefit from an inpatient psych admission to better manage her impulse control.  In order to aid in this, I am going to start the patient on Lamictal with the plan to transition the patient off of Keppra.  I reviewed the risk and benefits of this with the patient.  The family expressed understanding of the up titration schedule.  I will tentatively follow the patient up in 6 months.  The patient should get blood work today to assure that she does not have any abnormalities that would preclude Lamictal.  I will also have the patient check blood work at 1 month and month 3 to make sure that she is not having any more effects of Lamictal.  I also instructed her to touch base with her primary care to see if any of her medications need to be adjusted during that she starting Lamictal.  I will get an MRI of the brain when the patient follows up in 6 months.      CMP, CBC today, month 1 and months 3  Start lamictal  Follow up in 6 months with a MRI    I spent 30 minutes in this visit, with >50% direct patient time spent counseling about prognosis, treatment options, and coordination of care.      (Chart documentation was completed in part with Dragon voice-recognition software. Even though reviewed, some grammatical, spelling, and word errors may  remain.)         Again, thank you for allowing me to participate in the care of your patient.      Sincerely,    Gurinder Edouard MD

## 2018-11-06 NOTE — PATIENT INSTRUCTIONS
Will start lamictal Initial: Weeks 1 and 2: 25 mg once daily; Weeks 3 and 4: 50 mg once daily Week 6: 150 mg once daily; Week 7: 200 mg once daily      Will check blood work today.    Will need to repeat blood work in 1 month and three months    Will follow up in 6 months with a MRI    You saw a neurology provider today at the [facility].  You may have also met with the MS RN Care Coordinator.  In order to get a message to your MS Center provider, you should contact [phone] option 3 for the triage nurse line.    You should contact us via this protocol if you have any of the following symptoms:    New or worsening neurologic symptoms that persist for 24-48 hours, such as:  o New onset of pain or marked worsening of pain  o Difficulty with speech, swallowing, or breathing  o New onset of vertigo or dizziness  o Change in bowel or bladder function (incontinence, difficulty urinating)    Increasing difficulty in self care    Marked changes in vision (double vision, blurred vision, graying of vision)    Change in mobility    Change in cognitive function    Falling    Worsening numbness, tingling or pain with a change in function    Worsening fatigue lasting more than 2 weeks  If you had labs completed today, we will contact you with the results.  If you are active in [facility], they will be released to you there.  Otherwise, your results will be provided to you via mail or telephone call.  Some results take up to 2 weeks for completion.  If you haven t heard anything about your lab results within 2 weeks, you can call or send a [facility] message to obtain your results.  If you have an MRI scheduled in the week or two prior to your next appointment, we will go over the results at your scheduled follow up appointment.  If you are not scheduled to see your MS Center provider within about 2 weeks after your MRI, please call or send a [facility] message to obtain your results if you  haven t heard anything within 2 weeks.  Please be aware that it takes at least 5 business days after routine MRIs for your results to be reviewed by both the radiologist and your doctor.  MRIs completed at facilities outside of the [facility] system take about 2 weeks in order for the MRI disc to be mailed to our clinic and uploaded into your medical record.    Prescription refills should be faxed to us by your pharmacy.  Our fax number for prescription refills is [phone].  Please do your best to come to your appointments, and to arrive 15 minutes early to allow time for checking in.  [facility] reserves the right to terminate care of established patients if a patient misses three or more appointments in a clinic without providing notification within a 12-month period.    Developing Your Care Team  Individuals living with chronic illnesses like MS may be unaware that they are at risk for the same range of medical problems as everyone else.  This is why you must establish a relationship with other health care providers in addition to your Multiple Sclerosis doctor.  It can be difficult at times to figure out whether a health concern is related to your MS, or whether it is related to something else, such as hormonal changes, pseduoexacerbations, changes in your core body temperature, flu-like reactions to interferons, exercise, or infections.  Urinary tract infections (UTIs) are common culprits that can cause fatigue, weakness, or other  MS attack -like symptoms without classic symptoms of a UTI.  For this reason, if you call or come in to discuss symptoms, you may be asked to get in touch with your primary provider or another specialist, so that you receive the comprehensive care you need.  What is Multiple Sclerosis (MS)?  MS is a disease in which the nerve tissues in the brain and/or spinal cord are attacked by immune cells in the body.  These immune cells are present in everyone, and  their normal role is to fight off infections.  In people with MS, these cells change the way they function and cross into the nervous system.  Once there, they cause inflammation that damages the myelin (or the protective coating of a nerve cell, much like the plastic covering on an electrical cord) and parts of the nerve cell itself.  So far, a clear cause for this immune system dysfunction has not been found.  MS often starts out as the  relapsing-remitting  form.  This means there are episodes when you have symptoms, and other times when you recover to normal or near-normal.  Over time, if the damage to the nervous system continues, the disease can cause additional disability, such as difficulty walking.  If the relapses and nerve damage can be prevented with available medications, many patients with MS can go many years between relapses and have relatively little disability.  Remember: MS is a condition that changes and must be evaluated on an ongoing basis!  What is a Relapse? (Also called flare-ups, attacks, or exacerbations)  Relapses are due to the occurrence of inflammation in some part of the brain and/or spinal cord.  A relapse is new or recurrent symptoms which persist for at least 24 hours and sometimes worsen over 48 hours.  New symptoms need to be  by at least 1 month in order to be considered separate relapses. Most of the time, symptoms reach their maximal intensity within 2 weeks and then begin to slowly resolve.  At times, your symptoms may not recover fully for up to 6 months, depending on the severity of the episode.  The frequency of relapses is generally higher early in the disease, but can vary greatly among individuals with MS.  Improvement of symptoms for an individual is unpredictable with each relapse.    It is important to remember that an increase in symptoms and changes in function may not necessarily be a relapse.  There are other factors that contribute to such changes, such  as hot weather, increased body temperature, infection/illness, stress and sleep deprivation.  The worsening of symptoms may feel like a relapse when in reality it is not.  These episodes are referred to as pseudorelapses.  Once the underlying cause is addressed, symptoms usually fade away and you feel better.  If you experience a worsening of symptoms that lasts more than 48 hours and does not improve with cooling down, decreasing stress, or treatment of an infection, please call us and we can help to better determine whether you are having a pseudorelapse versus a relapse.        Lamotrigine tablets  Brand Names: Lamictal, Subvenite  What is this medicine?  LAMOTRIGINE (la LORNA tri jeen) is used to control seizures in adults and children with epilepsy and Lennox-Gastaut syndrome. It is also used in adults to treat bipolar disorder.  How should I use this medicine?  Take this medicine by mouth with a glass of water. Follow the directions on the prescription label. Do not chew these tablets. If this medicine upsets your stomach, take it with food or milk. Take your doses at regular intervals. Do not take your medicine more often than directed.  A special MedGuide will be given to you by the pharmacist with each new prescription and refill. Be sure to read this information carefully each time.  Talk to your pediatrician regarding the use of this medicine in children. While this drug may be prescribed for children as young as 2 years for selected conditions, precautions do apply.  What side effects may I notice from receiving this medicine?  Side effects that you should report to your doctor or health care professional as soon as possible:    allergic reactions like skin rash, itching or hives, swelling of the face, lips, or tongue    changes in vision    depressed mood    elevated mood, decreased need for sleep, racing thoughts, impulsive behavior    fever with rash, swollen lymph nodes, or swelling of the face    loss  of balance or coordination    mouth sores    redness, blistering, peeling or loosening of the skin, including inside the mouth    right upper belly pain    seizures    severe muscle pain    signs and symptoms of aseptic meningitis such as stiff neck and sensitivity to light, headache, drowsiness, fever, nausea, vomiting, rash    signs of infection - fever or chills, cough, sore throat, pain or difficulty passing urine    suicidal thoughts or other mood changes    swollen lymph nodes    trouble walking    unusual bruising or bleeding    unusually weak or tired    yellowing of the eyes or skin  Side effects that usually do not require medical attention (report to your doctor or health care professional if they continue or are bothersome):    diarrhea    dizziness    dry mouth    stuffy nose    tiredness    tremors    trouble sleeping  What may interact with this medicine?      atazanavir    carbamazepine    female hormones, including contraceptive or birth control pills    lopinavir    methotrexate    phenobarbital    phenytoin    primidone    pyrimethamine    rifampin    ritonavir    trimethoprim    valproic acid  What if I miss a dose?  If you miss a dose, take it as soon as you can. If it is almost time for your next dose, take only that dose. Do not take double or extra doses.  Where should I keep my medicine?  Keep out of reach of children.  Store at room temperature between 15 and 30 degrees C (59 and 86 degrees F). Throw away any unused medicine after the expiration date.  What should I tell my health care provider before I take this medicine?  They need to know if you have any of these conditions:    aseptic meningitis during prior use of lamotrigine    depression    folate deficiency    kidney disease    liver disease    suicidal thoughts, plans, or attempt; a previous suicide attempt by you or a family member    an unusual or allergic reaction to lamotrigine or other seizure medications, other medicines,  foods, dyes, or preservatives    pregnant or trying to get pregnant    breast-feeding  What should I watch for while using this medicine?  Visit your doctor or health care professional for regular checks on your progress. If you take this medicine for seizures, wear a Medic Alert bracelet or necklace. Carry an identification card with information about your condition, medicines, and doctor or health care professional.  It is important to take this medicine exactly as directed. When first starting treatment, your dose will need to be adjusted slowly. It may take weeks or months before your dose is stable. You should contact your doctor or health care professional if your seizures get worse or if you have any new types of seizures. Do not stop taking this medicine unless instructed by your doctor or health care professional. Stopping your medicine suddenly can increase your seizures or their severity.  Contact your doctor or health care professional right away if you develop a rash while taking this medicine. Rashes may be very severe and sometimes require treatment in the hospital. Deaths from rashes have occurred. Serious rashes occur more often in children than adults taking this medicine. It is more common for these serious rashes to occur during the first 2 months of treatment, but a rash can occur at any time.  You may get drowsy, dizzy, or have blurred vision. Do not drive, use machinery, or do anything that needs mental alertness until you know how this medicine affects you. To reduce dizzy or fainting spells, do not sit or stand up quickly, especially if you are an older patient. Alcohol can increase drowsiness and dizziness. Avoid alcoholic drinks.  If you are taking this medicine for bipolar disorder, it is important to report any changes in your mood to your doctor or health care professional. If your condition gets worse, you get mentally depressed, feel very hyperactive or manic, have difficulty  sleeping, or have thoughts of hurting yourself or committing suicide, you need to get help from your health care professional right away. If you are a caregiver for someone taking this medicine for bipolar disorder, you should also report these behavioral changes right away. The use of this medicine may increase the chance of suicidal thoughts or actions. Pay special attention to how you are responding while on this medicine.  Your mouth may get dry. Chewing sugarless gum or sucking hard candy, and drinking plenty of water may help. Contact your doctor if the problem does not go away or is severe.  Women who become pregnant while using this medicine may enroll in the [place] Antiepileptic Drug Pregnancy Registry by calling 1-888.147.5113. This registry collects information about the safety of antiepileptic drug use during pregnancy.  NOTE:This sheet is a summary. It may not cover all possible information. If you have questions about this medicine, talk to your doctor, pharmacist, or health care provider. Copyright  2018 [employer]

## 2018-11-06 NOTE — PROGRESS NOTES
MULTIPLE SCLEROSIS CLINIC AT THE [facility]  FOLLOWUP/ESTABLISHED PATIENT VISIT      PRINCIPAL NEUROLOGIC DIAGNOSIS: Deferred      Date of Onset: 2017  Date of Diagnosis: 2017  Initial Clinical Course: Monophasic  Current Clinical Course: Primary Progressive  Past Disease Modifying Therapy(ies): NA  Current Disease Modifying Therapy(ies): NA  Most Recent MRI of the Brain: 3/19/18  Most Recent MRI of the Cervical Cord 18  Most Recent MRI of the Thoracic Cord: 18  Most Recent Lumbar Puncture: 17 + OCBs  Most Recent OCT: NA   Most Recent JCV: NA  Most Recent Remote Hepatitis Panel: 18 negative  Most Recent VZV Ig18 positive  Most Recent TB Quant: 18 positive      CHIEF COMPLAINT: Follow up on DMT      INTERVAL HISTORY:    The patient reports that she has been getting better. The patient's father reports that she is wondering around for a couple of days. The patient reports that she has significant issues with her wondering out. She will have bnurst of anger that make managing her difficult at home.     Issues with current MS therapy: Tolerating DMT without issue    REVIEW OF SYSTEMS:    Mood: unchanged  Spasticity:none  Bladder: none  Bowel: unchanged  Pain related to today's visit:reviewed on nursing intake documentation  Fatigue: unchanged  Sleep: well/ no problem  Memory/Concentration: worse, difficulty with word finding, difficulty with short term memory, difficulty with executive function, getting lost in familiar places and difficulty multitasking       Otherwise 10 point ROS was neg other than the symptoms noted above.    PAST HISTORY was reviewed and updated:      MEDICATIONS and ALLERGIES were reviewed and updated.    SOCIAL HISTORY was reviewed and updated:    EXAM:    PHYSICAL EXAMINATION:   VITAL SIGNS:  B/P: 119/79, T: Data Unavailable, P: 83, R: Data Unavailable    MENTAL STATUS: Alert,awake and oriented times four. She has poor insight, only remember 1  out 3 with delayed recall. Emotionally liable   CRANIAL NERVES:  Visual fields are full to confrontation. The pupils are  round and react to light and there is no Jovanny Elise pupil.  Extraocular movements are  intact with no  internuclear ophthalmoplegia. No nystagmus. Facial strength and sensation are  normal. Hearing is  normal. Palate elevation and tongue protrusion are  normal.   POWER:      Motor     Upper         Right Left   Shoulder Abduction 5 5   Elbow Flexion 5 5   Elbow Extension 5 5   Wrist Extension 5 5   Digit Extension 5 5   Digit Flexion 5 5   APB 5 5   Tone 0 0   Lower          Right Left   Hip Flexion 5 5   Knee Extension 5 5   Knee Flexion 5 5   Foot Dorsiflexion 5 5   Foot Plantar Flexion 5 5   EH 5 5   Toe Flexion 5 5   Tone 0 0             Grade Description   0 No increase in muscle tone   1 Slight increase in muscle tone, manifested by a catch and release or by minimal resistance at the end of the range of motion when the affected part(s) is moved in flexion or extension   1+ Slight increase in muscle tone, manifested by a catch, followed by minimal resistance throughout the remainder (less than half) of the ROM   2 More marked increase in muscle tone through most of the ROM, but affected part(s) easily moved   3 Considerable increase in muscle tone, passive movement difficult   4 Affected part(s) rigid in flexion or extension               SENSORY:      Light touch:  Intact in all extremities        MOTOR/CEREBELLAR:     Right Left   RRM 0 Normal 0 Normal   DANNY 0 Normal 0 Normal   FTN 0 Normal 0 Normal   RRM 1 Abnormal 0 Normal   HKS 0 Normal 0 Normal               GAIT: Gait is   narrow-based and steady and the patient is  able to walk on heels, toes and in tandem without difficulty.    Romberg: Stable with eye(s) closed      RESULTS:  Monitoring labs:    Orders Only on 07/31/2018   Component Date Value Ref Range Status     CD19 B Cells 07/31/2018 17  6 - 27 % Final     Absolute CD19  07/31/2018 465  107 - 698 cells/uL Final   Office Visit on 07/31/2018   Component Date Value Ref Range Status     WBC 07/31/2018 7.8  4.0 - 11.0 10e9/L Final     RBC Count 07/31/2018 4.38  3.8 - 5.2 10e12/L Final     Hemoglobin 07/31/2018 14.4  11.7 - 15.7 g/dL Final     Hematocrit 07/31/2018 40.8  35.0 - 47.0 % Final     MCV 07/31/2018 93  78 - 100 fl Final     MCH 07/31/2018 32.9  26.5 - 33.0 pg Final     MCHC 07/31/2018 35.3  31.5 - 36.5 g/dL Final     RDW 07/31/2018 11.1  10.0 - 15.0 % Final     Platelet Count 07/31/2018 271  150 - 450 10e9/L Final     Diff Method 07/31/2018 Automated Method   Final     % Neutrophils 07/31/2018 55.5  % Final     % Lymphocytes 07/31/2018 36.0  % Final     % Monocytes 07/31/2018 6.0  % Final     % Eosinophils 07/31/2018 1.5  % Final     % Basophils 07/31/2018 0.6  % Final     % Immature Granulocytes 07/31/2018 0.4  % Final     Nucleated RBCs 07/31/2018 0  0 /100 Final     Absolute Neutrophil 07/31/2018 4.4  1.6 - 8.3 10e9/L Final     Absolute Lymphocytes 07/31/2018 2.8  0.8 - 5.3 10e9/L Final     Absolute Monocytes 07/31/2018 0.5  0.0 - 1.3 10e9/L Final     Absolute Eosinophils 07/31/2018 0.1  0.0 - 0.7 10e9/L Final     Absolute Basophils 07/31/2018 0.1  0.0 - 0.2 10e9/L Final     Abs Immature Granulocytes 07/31/2018 0.0  0 - 0.4 10e9/L Final     Absolute Nucleated RBC 07/31/2018 0.0   Final     Sodium 07/31/2018 140  133 - 144 mmol/L Final     Potassium 07/31/2018 3.6  3.4 - 5.3 mmol/L Final     Chloride 07/31/2018 106  94 - 109 mmol/L Final     Carbon Dioxide 07/31/2018 26  20 - 32 mmol/L Final     Anion Gap 07/31/2018 8  3 - 14 mmol/L Final     Glucose 07/31/2018 90  70 - 99 mg/dL Final     Urea Nitrogen 07/31/2018 12  7 - 30 mg/dL Final     Creatinine 07/31/2018 0.69  0.52 - 1.04 mg/dL Final     GFR Estimate 07/31/2018 >90  >60 mL/min/1.7m2 Final     GFR Estimate If Black 07/31/2018 >90  >60 mL/min/1.7m2 Final     Calcium 07/31/2018 9.1  8.5 - 10.1 mg/dL Final     Bilirubin  Total 07/31/2018 0.4  0.2 - 1.3 mg/dL Final     Albumin 07/31/2018 4.0  3.4 - 5.0 g/dL Final     Protein Total 07/31/2018 8.1  6.8 - 8.8 g/dL Final     Alkaline Phosphatase 07/31/2018 92  40 - 150 U/L Final     ALT 07/31/2018 28  0 - 50 U/L Final     AST 07/31/2018 12  0 - 45 U/L Final     Hepatitis B Core Sonal 07/31/2018 Nonreactive  NR^Nonreactive Final     Hepatitis B Surface Antibody 07/31/2018 0.20  <8.00 m[IU]/mL Final     Hep B Surface Agn 07/31/2018 Nonreactive  NR^Nonreactive Final     HCV RNA Quant IU/ml 07/31/2018 HCV RNA Not Detected  HCVND^HCV RNA Not Detected [IU]/mL Final     Log of HCV RNA Qt 07/31/2018 Not Calculated  <1.2 Log IU/mL Final     HIV Antigen Antibody Combo 07/31/2018 Nonreactive  NR^Nonreactive     Final     Varicella Zoster Virus Antibody IgG 07/31/2018 4.1* 0.0 - 0.8 AI Final     Vitamin B12 07/31/2018 544  193 - 986 pg/mL Final     Vitamin B6 07/31/2018 31.0  20.0 - 125.0 nmol/L Final     Vitamin D Deficiency screening 07/31/2018 34  20 - 75 ug/L Final     Treponema Antibodies 07/31/2018 Nonreactive  NR^Nonreactive Final     Rheumatoid Factor 07/31/2018 <20  <20 IU/mL Final     HCG Qualitative Serum 07/31/2018 Negative  NEG^Negative Final     Quantiferon-TB Gold Plus Result 07/31/2018 Negative  NEG^Negative Final     TB1 Ag minus Nil Value 07/31/2018 0.02  IU/mL Final     TB2 Ag minus Nil Value 07/31/2018 0.02  IU/mL Final     Mitogen minus Nil Result 07/31/2018 >10.00  IU/mL Final     Nil Result 07/31/2018 0.20  IU/mL Final   ]      MRI brain:    no new MRI to review    ASSESSMENT/PLAN:  The patient is a 28-year-old female with a past medical history of autoimmune encephalitis most 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  I am treating the patient as she has an autoimmune encephalitis given that she continued to develop new lesions outside of her acute illness process.  I am treating the patient  with rituximab given that I am uncertain whether or not this truly represents multiple sclerosis or CNS vasculitis.  Treating with rituximab would allow coverage of both conditions.  In regards to the patient's cognitive status, I feel that the patient probably lacks capacity to make medical decisions for herself given her extremely poor insight.  The patient would probably further benefit from an inpatient psych admission to better manage her impulse control.  In order to aid in this, I am going to start the patient on Lamictal with the plan to transition the patient off of Keppra.  I reviewed the risk and benefits of this with the patient.  The family expressed understanding of the up titration schedule.  I will tentatively follow the patient up in 6 months.  The patient should get blood work today to assure that she does not have any abnormalities that would preclude Lamictal.  I will also have the patient check blood work at 1 month and month 3 to make sure that she is not having any more effects of Lamictal.  I also instructed her to touch base with her primary care to see if any of her medications need to be adjusted during that she starting Lamictal.  I will get an MRI of the brain when the patient follows up in 6 months.      CMP, CBC today, month 1 and months 3  Start lamictal  Follow up in 6 months with a MRI    I spent 30 minutes in this visit, with >50% direct patient time spent counseling about prognosis, treatment options, and coordination of care.    Gurinder Edouard MD [facility]  Staff Neurologist   11/06/18       (Chart documentation was completed in part with Dragon voice-recognition software. Even though reviewed, some grammatical, spelling, and word errors may remain.)

## 2018-12-05 ENCOUNTER — DOCUMENTATION ONLY (OUTPATIENT)
Dept: NEUROSURGERY | Facility: CLINIC | Age: 28
End: 2018-12-05

## 2018-12-05 ENCOUNTER — TRANSFERRED RECORDS (OUTPATIENT)
Dept: HEALTH INFORMATION MANAGEMENT | Facility: CLINIC | Age: 28
End: 2018-12-05

## 2018-12-05 NOTE — PROGRESS NOTES
Received lab results from [facility] 12/5/18, results sent to be scanned.    Theresa Shelby,CMA

## 2018-12-13 ENCOUNTER — TELEPHONE (OUTPATIENT)
Dept: NEUROLOGY | Facility: CLINIC | Age: 28
End: 2018-12-13

## 2018-12-13 DIAGNOSIS — R56.9 SEIZURES (H): Primary | ICD-10-CM

## 2018-12-13 NOTE — TELEPHONE ENCOUNTER
"I called Alex, the patient's father back, to discuss what is going on; He doesn't know where the \"carbidopa-levodopa\" portion came in with the previous message received; He said that Bobby is still on the Keppra, and she has titrated up to 100mg of the Lamictal; He said that when they say \"lethargic\", they mean she's \"zoned out\" and \"all over the place\"; The lab work was done and was scanned in the media tab (we weren't aware of this, that I can tell); Alex doesn't feel Bobby needs to go to the ER; He is, however, wondering the next step, now that Bobby is at the full dose of Lamictal (i.e., start weaning off Keppra?); I told Alex that I would send this over to Dr. Edouard for review.    Rachel Awad, MS RN Care Coordinator    "

## 2018-12-13 NOTE — TELEPHONE ENCOUNTER
Dr. Edouard, please see message from the call center; Please let me know any input; Thank you.    Rachel Awad, MS RN Care Coordinator

## 2018-12-13 NOTE — TELEPHONE ENCOUNTER
Hi    If she is getting more lethargic, then I would tell them to take her to ED. I did not know she was on capridopra-libradopa. Do they mean keppra, because, she was not suppose to wean that off yet.

## 2018-12-13 NOTE — LETTER
December 17, 2018    Bobby [address]  [address] [place] [address]    Hi Bobby and family,    Here are the titration instructions for the Keppra per Dr. Edouard:    Starting on 1/14/19, Bobby will begin to wean Keppra. Bobby will receive 250mg tablets of Keppra.    Starting on 1/14/19, she will take 1250mg in the morning and 1500mg in the evening  Starting on 1/21/19, she will take 1250mg in the morning and 1250mg in the evening  Starting on 1/28/19, she will take 1000mg in the morning and 1250mg in the evening  Starting on 2/4/19, she will take 1000mg in the morning and 1000mg in the evening  Starting on 2/11/19, she will take 750mg in the morning and 1000mg in the evening  Starting on 2/18/19, she will take 750mg in the morning and 750mg in the evening  Starting on 2/25/19, she will take 500mg in the morning and 750mg in the evening  Starting on 3/4/19, she will take 500mg in the morning and 500mg in the evening  Starting on 3/11/19, she will take 250mg in the morning and 500mg in the evening  Starting on 3/18/19, she will take 250mg in the morning and 250mg in the evening  Starting on 3/25/19, she will take 0 mg in the morning and 250mg in the evening  Then on 4/1/19 she should be off the Keppra.     She should only continue weaning the medication as long as she is not having any evidence of worsening confusion or seizures.     When starting the wean, Dr. Edouard will order rectal diazapem for seizure lasting more than 5 minutes.    For seizures, please call my office if brief or immediately present to the hospital emergently for prolonged seizure.    Please let us know if you have any questions.    Sincerely,    MD Rachel Salas, MS RN Care Coordinator  Multiple Sclerosis Center  [facility]  [address] [place] [id number]  Phone [phone]  Fax [phone]

## 2018-12-13 NOTE — TELEPHONE ENCOUNTER
Health Call Center    Phone Message    May a detailed message be left on voicemail: yes    Reason for Call: Other: Sharon calling per orders saying to call in 6 weeks from the weaning off of the capridopra-libradopa.  Karen did state that Bobby is starting to become lethargic. Also wanting to verify that orders for January blood draw was sent to hospital in [place].  Please call either Karen or Alex to discuss care.    Action Taken: Message routed to:  [facility]

## 2018-12-14 RX ORDER — LAMOTRIGINE 50 MG/1
50 TABLET, EXTENDED RELEASE ORAL DAILY
Qty: 14 TABLET | Refills: 0 | Status: SHIPPED | OUTPATIENT
Start: 2018-12-14 | End: 2018-12-20

## 2018-12-14 RX ORDER — LAMOTRIGINE 100 MG/1
300 TABLET, EXTENDED RELEASE ORAL DAILY
Qty: 270 TABLET | Refills: 3 | Status: SHIPPED | OUTPATIENT
Start: 2018-12-14 | End: 2018-12-20

## 2018-12-14 NOTE — TELEPHONE ENCOUNTER
Spent a prolong period of time on the phone talking with the patient's  Mother.    The plan is     On Monday 150mg of lamictal and 1500mg ok keppra bid  On 12/24  200mg of lamictal and 1500mg ok keppra bid  On 12/31  300mg of lamictal and 1500mg ok keppra bid    Going forward the patient will continue on lamictal 300mg    Starting on 1/14/19, the patient will begin to wean keppra.    The patient will receive 250mg tablets of keppra    Starting on 1/14/19 She will take 1250mg and 1500mg  Starting on 1/21/19 She will take 1250mg and 1250mg  Starting on 1/28/19 She will take 1000mg and 1250mg  Starting on 2/4/19 She will take 1000mg and 1000mg  Starting on 2/11/19 She will take 750mg and 1000mg  Starting on 2/18/19 She will take 750mg and 750mg  Starting on 2/25/19 She will take 500mg and 750mg  Starting on 3/4/19 She will take 500mg and 500mg  Starting on 3/11/19 She will take 250mg and 500mg  Starting on 3/18/19 She will take 250mg and 250mg  Starting on 3/25/19 She will take 0 mg and 250mg  Then on 4/1 she should be off on keppra.   She should only continue weaning the medication as long as she is not having any evidence of worsening confusion or seizures.     When started the wean I will order rectal dizapem for seizure lasting more than 5 minutes    For seizures please call my office if brief or immediately present to the hospital emergently      Can you send the keppra weaning schedule to the patient's family in a letter. Also can we make sure that they have orders for lab work in January.

## 2018-12-17 ENCOUNTER — TELEPHONE (OUTPATIENT)
Dept: NEUROLOGY | Facility: CLINIC | Age: 28
End: 2018-12-17

## 2018-12-17 DIAGNOSIS — G40.909 SEIZURE DISORDER (H): Primary | ICD-10-CM

## 2018-12-17 NOTE — TELEPHONE ENCOUNTER
Prior Authorization Retail Medication Request    Medication/Dose: Lamotrigine ER 50mg  ICD code (if different than what is on RX):  Seizure, R56.9  Previously Tried and Failed:  n/a  Rationale:  Initiation of medication to help with titrating up to full dose of lamotrigine, please approve.    Insurance Name:  [facility]  Insurance ID:  [id number]  Insurance Phone: [phone]    Pharmacy Information (if different than what is on RX)  Name:  [facility]  Phone:  [phone]  Fax: [phone]

## 2018-12-17 NOTE — TELEPHONE ENCOUNTER
[facility] Call Center    Phone Message    May a detailed message be left on voicemail: yes    Reason for Call: Other: Sharron calling to check up on pt's lamoTRIgine (LAMICTAL) 100 MG 24 hr tablet medication. She said that she was at [facility] an hour ago and they said that the pt's insurance wouldn't cover the kit. She just wants to make sure someone contacted the insurance company before the pt runs out of medication on Wednesday. Please give Lexus a call back at [phone] to discuss.      Action Taken: Message routed to:  [facility] Neurology

## 2018-12-18 ENCOUNTER — TELEPHONE (OUTPATIENT)
Dept: NEUROLOGY | Facility: CLINIC | Age: 28
End: 2018-12-18

## 2018-12-18 RX ORDER — LAMOTRIGINE 25 MG/1
25 TABLET, CHEWABLE ORAL 2 TIMES DAILY
Qty: 14 TABLET | Refills: 0 | Status: SHIPPED | OUTPATIENT
Start: 2018-12-18 | End: 2019-12-18

## 2018-12-18 NOTE — TELEPHONE ENCOUNTER
Central Prior Authorization Team   Phone: [phone]    PA Initiation    Medication: lamoTRIgine (LAMICTAL) 50 MG 24 hr tablet  Insurance Company: [employer] - Phone [phone] Fax [phone]  Pharmacy Filling the Rx: [facility] [id number] [place] [address] [address] AT [facility] OF  [address]  Filling Pharmacy Phone: [phone]  Filling Pharmacy Fax: [phone]  Start Date: 12/18/2018

## 2018-12-18 NOTE — TELEPHONE ENCOUNTER
Dr. Edouard sent an order for an accepted alternative; I called the pharmacy to let them know this, and they did receive the prescription.    Rachel Awad MS RN Care Coordinator

## 2018-12-18 NOTE — TELEPHONE ENCOUNTER
Letter with Keppra titration schedule placed in the mail to the patient's dad's home.    Rachel Awad MS RN Care Coordinator

## 2018-12-18 NOTE — TELEPHONE ENCOUNTER
Central Prior Authorization Team   Phone: [phone]    PA Initiation    Medication: lamoTRIgine (LAMICTAL) 100 MG 24 hr tablet  Insurance Company: [employer] - Phone [phone] Fax [phone]  Pharmacy Filling the Rx: [facility] [id number] [place] [address] [address] AT [facility] OF  & [address]  Filling Pharmacy Phone: [phone]  Filling Pharmacy Fax: [phone]  Start Date: 12/18/2018

## 2018-12-18 NOTE — TELEPHONE ENCOUNTER
Dr. Edouard, please see denial of Lamotrigine ER; Please send acceptable alternative medication to her pharmacy; Thank you.    Rachel Awad, MS RN Care Coordinator

## 2018-12-18 NOTE — TELEPHONE ENCOUNTER
PRIOR AUTHORIZATION DENIED    Medication: lamoTRIgine (LAMICTAL) 50 MG 24 hr tablet - P/A DENIED    Denial Date: 12/18/2018    Denial Rational:  This is nonformulary. Consider using the preferred alternative such as Lamictal Starter Pack, Lamotrigine 25mg chewable dispersible tablet, or tablet (IR); or provide a reason why the alternatives are not appropriate.

## 2018-12-20 ENCOUNTER — TELEPHONE (OUTPATIENT)
Dept: NEUROLOGY | Facility: CLINIC | Age: 28
End: 2018-12-20

## 2018-12-20 DIAGNOSIS — G40.909 SEIZURE DISORDER (H): Primary | ICD-10-CM

## 2018-12-20 RX ORDER — LAMOTRIGINE 100 MG/1
150 TABLET ORAL 2 TIMES DAILY
Qty: 270 TABLET | Refills: 3 | Status: SHIPPED | OUTPATIENT
Start: 2018-12-20 | End: 2019-12-20

## 2018-12-20 RX ORDER — LEVETIRACETAM 250 MG/1
1500 TABLET ORAL 2 TIMES DAILY
Qty: 1080 TABLET | Refills: 0 | Status: SHIPPED | OUTPATIENT
Start: 2018-12-20 | End: 2019-12-20

## 2018-12-20 NOTE — TELEPHONE ENCOUNTER
Hi I placed the order, The immmediate relase will need to be given twice daily. So the same schedule but she split the dose in half.     Gurinder Edouard MD [facility]  Staff Neurologist   12/20/18

## 2018-12-20 NOTE — TELEPHONE ENCOUNTER
I called Daylin to go through the lamotrigine prescriptions; After much discussion, she understands that Bobby needs to:    12/21/18-12/23/18: Lamotrigine 75mg in the morning and 75mg in the evening, to total 150mg daily    12/24/18-12/30/18: Lamotrigine 100mg in the morning and 100mg in the evening, to total 200mg daily    12/31/18 and on: Lamotrigine 150mg in the morning and 150mg in the evening, to total 300mg daily    I called the patient's pharmacy to be sure they had all the prescriptions needed; The prescriptions sent by Dr. Edouard this morning for lamotrigine 100mg tablets, as well as the leviteracetam 250mg tablets; The lamotrigine 25mg tablets that Dr. Edouard sent on Tuesday was being denied by insurance because they were sent for chewable tablets; Dr. Edouard was okay with me giving a new verbal for the lamotrigine 25mg immediate release tablets, which did sound like it was going to go through insurance just fine; Daylin will call us if needed.    Rachel Awad, MS RN Care Coordinator

## 2018-12-20 NOTE — TELEPHONE ENCOUNTER
[facility] Call Center    Phone Message    May a detailed message be left on voicemail: yes    Reason for Call: Other: Daylin Coley- pt's father's fiance needing call back to discuss pt's script of lamoTRIgine. Daylin stated that the 100mg time released is not covered by insurance. But the regular 100mg is. Daylin needs to know if she should fill the script of 25mg becasue the pt will be out tomorrow and the pt can not miss a dose     Action Taken: Message routed to:  [facility] neuro

## 2018-12-20 NOTE — TELEPHONE ENCOUNTER
Dr. Edouard, please see below message from the call center; The PA is pending for the Lamictal ER 100mg tablets; If the alternative is okay, please send updated prescription and I can let Daylin know this; Thank you.    Rachel Awad, MS RN Care Coordinator

## 2019-01-08 ENCOUNTER — TELEPHONE (OUTPATIENT)
Dept: NEUROLOGY | Facility: CLINIC | Age: 29
End: 2019-01-08

## 2019-01-08 NOTE — TELEPHONE ENCOUNTER
Health Call Center    Phone Message    May a detailed message be left on voicemail: yes    Reason for Call: Order(s): Other:   Reason for requested: Lab orders  Date needed: As soon as able to send  Provider name: Dr. Edouard     Please send lab orders to [facility], fax number [phone]      Action Taken: Message routed to:  [facility] Neurology

## 2019-01-08 NOTE — TELEPHONE ENCOUNTER
Lab orders faxed to the [facility] at fax [phone] as requested.    Rachel Awad, MS RN Care Coordinator

## 2019-01-09 DIAGNOSIS — F19.20: ICD-10-CM

## 2019-01-09 RX ORDER — FOLIC ACID 1 MG/1
1 TABLET ORAL DAILY
Qty: 60 TABLET | Refills: 0 | Status: SHIPPED | OUTPATIENT
Start: 2019-01-09 | End: 2019-02-25

## 2019-01-22 ENCOUNTER — TELEPHONE (OUTPATIENT)
Dept: NEUROLOGY | Facility: CLINIC | Age: 29
End: 2019-01-22

## 2019-01-22 NOTE — TELEPHONE ENCOUNTER
[facility] Call Center    Phone Message    May a detailed message be left on voicemail: yes    Reason for Call: Other: Daylin called in to talk to Rachel. She stated that she did get Rachel's VM message, but does not understand why we are trying to fill the Keppra, says it's too early. Daylin says they have leftover 750mg at home, and have been using 750mg plus 2-250mg tabs to equal 1200mg for pt.    Also, Daylin states the form/letter given to them regarding the break downs of weaning pt off of Keppra has been lost and she needs another form. She asks for it to be sent before next week since that is when the next change is supposed to be. Daylin says you may call her back at anytime today.     Action Taken: Message routed to:  [facility] Neurology

## 2019-01-22 NOTE — TELEPHONE ENCOUNTER
I talked with Daylin, and they are using 750mg and 250mg tablets to get the titration dosing of levetiracetam for Bobby; She does have enough medication for now, so neither of us really understand why a request was made from the pharmacy; Daylin did say, however, that they lost the titration schedule letter I had sent them back in December; I am putting another one in the mail to her today; I did provide her with the next titration dosing, just in case the letter does not get to hem by Sunday; They will let us know of any issues or questions.    Rachel Awad, MS RN Care Coordinator

## 2019-01-22 NOTE — TELEPHONE ENCOUNTER
[facility] Call Center    Phone Message    May a detailed message be left on voicemail: yes    Reason for Call: Other: Pharmacy calling stating that they are out of 250mg tabs of levETIRAcetam (KEPPRA) but they do have 750mg that they could fill for pt to take twice daily or whatever would equal her daily dose. Please call back if this is acceptable.     Action Taken: Message routed to:  [facility]

## 2019-01-22 NOTE — TELEPHONE ENCOUNTER
"I called the pharmacy to see what the issue is with her levetiracetam; Apparently, Bobby was only given a quantity of 120 of levetiracetam 250mg tablets back on 12/20/18, as opposed to the prescribed quantity of 1080 tablets, and the pharmacist was unable to tell me why; She said that the issue now, is that their pharmacy, as well as surrounding pharmacies, only have the 750mg levetiracetam tablets available, as there is  back up for the 250mg, 500mg and 1000mg tablets; The pharmacy is asking for guidance on what to do for this issue; Bobby was to start tapering the levetiracetam last week, therefore, it really is the only option for her to have the 250mg tablets; I called [facility] and confirmed that they do have some of the needed strength tablets in stock; They would, of course, need to check insurance coverage of the medication for their pharmacy, as some insurances restrict patients to certain mail order pharmacies; I left [facility] for Daylin advising of this issue and my plan; Essentially, what I need from Daylin, is the \"okay\" with her for me to send a new prescription to our [facility] for them to work on getting that set up for delivery; If for some reason they could not fill it (per insurance guidelines), they would transfer the prescription over to the proper pharmacy; I will wait to hear back from the Daylin, as well a update Dr. Edouard.    Rachel Awad, MS RN Care Coordinator    "

## 2019-02-25 DIAGNOSIS — F19.20: ICD-10-CM

## 2019-02-26 RX ORDER — FOLIC ACID 1 MG/1
1000 TABLET ORAL DAILY
Qty: 60 TABLET | Refills: 0 | Status: SHIPPED | OUTPATIENT
Start: 2019-02-26 | End: 2019-04-11

## 2019-02-26 NOTE — TELEPHONE ENCOUNTER
Last Clinic Visit:  2/27/18    NV: NONE   90 DAY RF   Scheduling has been notified to contact the pt for appointment.

## 2019-02-27 ENCOUNTER — TELEPHONE (OUTPATIENT)
Dept: INTERNAL MEDICINE | Facility: CLINIC | Age: 29
End: 2019-02-27

## 2019-02-27 NOTE — TELEPHONE ENCOUNTER
----- Message from Stefania Argueta RN sent at 2/26/2019  5:12 PM CST -----  Regarding: [facility]  APPT  Please call to schedule WITH   Tammy Peralta MD  Patient is overdue for annual clinic visit - unless otherwise indicated patient needs to be scheduled with 1st available.    Patient may need labs and or imaging - please check with RN care coordinator.  I do not need any follow up on the scheduling of this appointment unless the patient will no longer be receiving care in our clinic.  THANKS   MRT

## 2019-02-27 NOTE — TELEPHONE ENCOUNTER
No answered. Left message with clinic number requesting patient to call back to schedule annual exam with Dr. Peralta in the [facility].

## 2019-03-05 ENCOUNTER — TELEPHONE (OUTPATIENT)
Dept: NEUROLOGY | Facility: CLINIC | Age: 29
End: 2019-03-05

## 2019-03-05 NOTE — TELEPHONE ENCOUNTER
I called Alex and let him know Dr. Edouard's input; Since they are driving from [place], they would also like to have an appointment with Dr. Edouard; I will have a clinic coordinator call the patient to arrange an appointment.    Rachel Awad, MS RN Care Coordinator

## 2019-03-05 NOTE — TELEPHONE ENCOUNTER
We received a refill request for Keppra 750mg tablets from the patient's pharmacy; According to her titration schedule, she should now be on Keppra 500mg twice daily; I called and talked with Alex, and he verified that she is now using the Keppra 250mg tablets, which they just picked up the prescription 3 days ago; I am not going to send a new prescription for Keppra at this time, which I told Alex that; Alex did inform me, however that Bobby has been more disoriented lately, and isn't sure if Dr. Edouard would consider moving up her MRI and appointment here from May; I told Alex that I would send this over to Dr. Edouard, then give him a call back later today.    Rachel Awad, MS RN Care Coordinator

## 2019-03-05 NOTE — TELEPHONE ENCOUNTER
Hi    We can move the MRI up.    Sincerely  Gurinder Edouard MD [facility]  Staff Neurologist   03/05/19

## 2019-03-11 ENCOUNTER — TELEPHONE (OUTPATIENT)
Dept: NEUROLOGY | Facility: CLINIC | Age: 29
End: 2019-03-11

## 2019-03-11 DIAGNOSIS — G35 MULTIPLE SCLEROSIS (H): Primary | ICD-10-CM

## 2019-03-11 NOTE — TELEPHONE ENCOUNTER
Dr. Edouard, please see the message from the call center; I can call her about the weaning off Keppra, but I believe her main question is whether rectal diazepam should be available to them at home, just in case; What do you think? Thank you.    Rachel Awad, MS RN Care Coordinator

## 2019-03-11 NOTE — TELEPHONE ENCOUNTER
Hi    That is fine.    Sincerely  Gurinder Edouard MD [facility]  Staff Neurologist   03/11/19

## 2019-03-11 NOTE — TELEPHONE ENCOUNTER
[facility] Call Center    Phone Message    May a detailed message be left on voicemail: yes    Reason for Call: Other: Pt's stepmother Daylin requesting call back to discuss script of Kepra. Daylin stated that the pt is weaning off of the Kepra right now. Daylin needs to know if the script of rectal diazepam should be kept on hand while she is weaning off of the Kepra     Action Taken: Message routed to:  [facility] neuro

## 2019-03-12 RX ORDER — DIAZEPAM 2.5 MG/.5ML
10 GEL RECTAL PRN
Qty: 3 SUPPOSITORY | Refills: 1 | Status: SHIPPED | OUTPATIENT
Start: 2019-03-12

## 2019-03-12 NOTE — TELEPHONE ENCOUNTER
"Dr. Edouard placed an order for rectal diazepam, which I have faxed to the patient's pharmacy; I called Daylin to let her know this, and verified that they do not need a refill of the levetiracetam 250mg tablets; Daylin also wanted to update me on a few things going on with Bobby; She said that she feels like Bobby has confusion, but not sure what would be \"normal\" for her; Daylin said that Bobby is very \"systematic\" and goes to bed every night at 8pm; A few nights ago, she had gone to bed, then Daylin had come out of their bedroom some time after 10pm and found Bobby standing in the hallway, looking mortified, crying, saying she saw it was 10pm, but wasn't sure if it was daytime or nighttime; Daylin brought her over to the window and showed Bobby that it was nighttime; Daylin stayed up with Bobby and calmed her down; Another event is that Bobby gets her evening medications at 7:30pm every night, and they use a pill box for this; She said that they noticed the next days dose of medications were gone, and that Bobby had gotten up in the middle of the night and took a \"double dose\" of the medication; They have since taped the container shut; Daylin said that Bobby was very lethargic before, hard to get her out of bed, and now it seems like she is \"coming out of the heavy medicine feeling\" since weaning off; Daylin also says that Bobby is very \"reyes\" again; She can't make dinner on her own, and has been browsing the internet for homeless shelters; Daylin said that they just feel she is more confused, and worsening; I noted that the MRI and appointment with Dr. Edouard has not been moved up, so I did re-request a call out to them from our clinic coordinators as a high priority to get this moved up; I told Daylin that I would send this over to Dr. Edouard, but that I wasn't sure what else he would recommend right now, aside from moving up her MRI; I will call Daylin back when I hear back from Dr. Edouard.    Rachel Awad, MS RN " Care Coordinator

## 2019-03-12 NOTE — TELEPHONE ENCOUNTER
Hi    Increase to 500mg in the am and 750 pm and continue on this dose until she sees me in clinic.     The valium should only be given if she has a generalized tonic clonic seizure lasting more than 5 minutes.     I orders an extend eeg in this encounter. I want greater than 1 hour.    Sincerely  Gurinder Edouard MD [facility]  Staff Neurologist   03/12/19

## 2019-03-12 NOTE — TELEPHONE ENCOUNTER
[facility] Call Center    Phone Message    May a detailed message be left on voicemail: yes    Reason for Call: Other: Pts stepmother Daylin calling again to speak with Rachel. She states that, when she spoke with her earlier, she was talking to pt, pharmacist and Rachel at the same time and just wanted to go over what they spoke about again to make sure she understands correctly     Action Taken: Message routed to:  [facility]

## 2019-03-12 NOTE — TELEPHONE ENCOUNTER
I called Daylin back with Dr. Edouard's input; She does have some follow up questions that I was unable to answer; Dr. Edouard, they are hoping to have the EEG when she comes here for her MRI and appointment with you; Please place an order for the EEG you would like (there are too many choices for me to know); Daylin said that Bobby is down to levetiracetam 250mg in the morning and 500mg in the evening; She isn't sure if the dose should be increased with planning an EEG, and she also wants more specifics on when to give diazepam (i.e., does she need a dose now, with potential partial seizures); Bobby does not see a psychiatrist, and they will look in to insurance coverage for that; Will send to Dr. Edouard for input, then call Daylin back again.    Rachel Awad, MS RN Care Coordinator

## 2019-03-12 NOTE — TELEPHONE ENCOUNTER
Hi    Is she seeing anyone for psychiatric care locally. If not, then I think that they should. If they do, then I would contact them. Given that she is having these episodes, there could be a possibility that she is having partial seizures and becoming post ictal. She needs to get an hour long eeg. I think that she needs to be seen soon. What does is she on for the keppra? We could try increasing by 250mg to see if this leads to an improvement if they can't been seen soon. IF she has a kassandra seizure, she needs to go to the ED.    Sincerely  Gurinder Edouard MD [facility]  Staff Neurologist   03/12/19

## 2019-03-12 NOTE — TELEPHONE ENCOUNTER
I called Daylin back again with Dr. Edouard's input; She verbalized understanding of this at this time; They will notify us of any changes; I will follow up on Thursday to be sure the appointments were able to be moved up.    Rachel Awad, MS RN Care Coordinator

## 2019-04-08 ENCOUNTER — TELEPHONE (OUTPATIENT)
Dept: NEUROLOGY | Facility: CLINIC | Age: 29
End: 2019-04-08

## 2019-04-08 NOTE — TELEPHONE ENCOUNTER
Dr. Edouard, please see message from the call center; Please let me know any recommendations you have; Thank you.    Rachel Awad, MS RN Care Coordinator

## 2019-04-08 NOTE — TELEPHONE ENCOUNTER
[facility] Call Center    Phone Message    May a detailed message be left on voicemail: yes    Reason for Call: Other: Romi calling to say that Bobby has moved to [place], [place].  Romi is requesting a call back to get a list of her medications and medication directions so Bobby doesn't miss out on anything.  Romi is also wondering if Dr. Edouard knows of any providers in the [place], [place] area that she can go to and establish care.  Please call Romi back to discuss     Action Taken: Message routed to:  [facility]  Neurology

## 2019-04-09 NOTE — TELEPHONE ENCOUNTER
Spoke with patient's aunt and provided verbal instruction for Keppra, Lamictal and diazepam. Placed written orders in mail to aunt's home in [place].     Clinic Coordinator to cancel upcoming appts with Dr. Edouard as they do not intend to follow here anymore. They will proceed with finding a neurologist in [place].

## 2019-04-09 NOTE — TELEPHONE ENCOUNTER
[facility] Call Center    Phone Message    May a detailed message be left on voicemail: no    Reason for Call: Other: Pt's aunt Romi called to get clarification on directions and dosing for the Pt's med list and medication she takes prescribed by Dr. Edouard. Pt needs this so she can continue taking as directed. Please call her back.     Action Taken: Message routed to:  [facility]

## 2019-04-09 NOTE — TELEPHONE ENCOUNTER
Per Dr. Edouard: I am so don t know anyone in [place], sadly. I am unsure about the medication     Called Romi and left detailed VMM stating Dr. Edouard does not have any recommendations for a neurologist in [place]. I then directed the caller/pt contact medical records to request records.

## 2019-04-10 DIAGNOSIS — F19.20: ICD-10-CM

## 2019-04-10 NOTE — TELEPHONE ENCOUNTER
Health Call Center    Phone Message    May a detailed message be left on voicemail: yes    Reason for Call: Medication Refill Request    Has the patient contacted the pharmacy for the refill? Yes   Name of medication being requested: folic acid (FOLVITE) 1 MG table    Provider who prescribed the medication: Tammy Peralta MD  Pharmacy: [address] [address] phone: [phone] Fax: [phone]  Date medication is needed: asap         Action Taken: Message routed to:  [facility] p[facility]

## 2019-04-11 NOTE — TELEPHONE ENCOUNTER
folic acid (FOLVITE) 1 MG tablet  Last Written Prescription Date:   2/26/19  Last Fill Quantity: 60,   # refills: 0  Last Office Visit : 2/27/18  Future Office visit:  None    Scheduling has been notified to contact the pt for appointment.

## 2019-04-12 RX ORDER — FOLIC ACID 1 MG/1
1000 TABLET ORAL DAILY
Qty: 30 TABLET | Refills: 0 | Status: SHIPPED | OUTPATIENT
Start: 2019-04-12 | End: 2019-06-24

## 2019-04-12 NOTE — TELEPHONE ENCOUNTER
"Was notified by [facility] in [place] that patient is relocating back to [place].     Called and spoke to Daylin patient's step-mom and she tells me that Bobby had been staying with her dad and her until she \"ran off\" to [place] and was found homeless. She hadn't been taking her meds. A family friend swooped in to help but can't care for her.   Bobby is not back in [place] and Daylin would like the EEG, brain MRI and appointment with Dr. Edouard re-scheduled for 4/16 if possible. If not possible, schedule on a different day.   Message given directly to Clinic Coordinator to contact Daylin to schedule.   "

## 2019-04-17 DIAGNOSIS — F19.20: ICD-10-CM

## 2019-04-20 RX ORDER — FOLIC ACID 1 MG/1
1000 TABLET ORAL DAILY
Qty: 14 TABLET | Refills: 0 | Status: SHIPPED | OUTPATIENT
Start: 2019-04-20 | End: 2019-06-24

## 2019-04-20 RX ORDER — LANOLIN ALCOHOL/MO/W.PET/CERES
100 CREAM (GRAM) TOPICAL DAILY
Qty: 30 TABLET | Refills: 0 | Status: SHIPPED | OUTPATIENT
Start: 2019-04-20 | End: 2019-06-08

## 2019-04-20 NOTE — TELEPHONE ENCOUNTER
folic acid (FOLVITE) 1 MG      14 DAY RF  OVER DUE APPT  Scheduling has been notified to contact the pt for appointment.    thiamine 100 MG   30 DAY RF  OVER DUE APPT  Scheduling has been notified to contact the pt for appointment.

## 2019-04-22 ENCOUNTER — TELEPHONE (OUTPATIENT)
Dept: INTERNAL MEDICINE | Facility: CLINIC | Age: 29
End: 2019-04-22

## 2019-04-22 NOTE — TELEPHONE ENCOUNTER
----- Message from Stefania Argueta RN sent at 4/20/2019  1:00 PM CDT -----  Regarding: [facility]  APPT  Please call to schedule WITH LAWRENCE JOHNSON  Patient is overdue for annual clinic visit - unless otherwise indicated patient needs to be scheduled with 1st available.  Patient may need labs and or imaging - please check with RN care coordinator.  I do not need any follow up on the scheduling of this appointment unless the patient will no longer be receiving care in our clinic.  THANKS  MRT

## 2019-04-22 NOTE — TELEPHONE ENCOUNTER
No answered. Left message with clinic number requesting patient to call back to schedule annual exam in the Primary Care Clinic.

## 2019-04-24 ENCOUNTER — OFFICE VISIT (OUTPATIENT)
Dept: NEUROLOGY | Facility: CLINIC | Age: 29
End: 2019-04-24
Attending: PSYCHIATRY & NEUROLOGY
Payer: COMMERCIAL

## 2019-04-24 ENCOUNTER — ALLIED HEALTH/NURSE VISIT (OUTPATIENT)
Dept: NEUROLOGY | Facility: CLINIC | Age: 29
End: 2019-04-24
Payer: COMMERCIAL

## 2019-04-24 ENCOUNTER — ANCILLARY PROCEDURE (OUTPATIENT)
Dept: MRI IMAGING | Facility: CLINIC | Age: 29
End: 2019-04-24
Attending: PSYCHIATRY & NEUROLOGY
Payer: COMMERCIAL

## 2019-04-24 VITALS
SYSTOLIC BLOOD PRESSURE: 119 MMHG | OXYGEN SATURATION: 97 % | HEART RATE: 86 BPM | BODY MASS INDEX: 22.14 KG/M2 | DIASTOLIC BLOOD PRESSURE: 76 MMHG | WEIGHT: 123 LBS

## 2019-04-24 DIAGNOSIS — G92.9 TOXIC ENCEPHALOPATHY: ICD-10-CM

## 2019-04-24 DIAGNOSIS — G35 MS (MULTIPLE SCLEROSIS) (H): ICD-10-CM

## 2019-04-24 DIAGNOSIS — G35 MULTIPLE SCLEROSIS (H): Primary | ICD-10-CM

## 2019-04-24 DIAGNOSIS — G40.901 STATUS EPILEPTICUS, GENERALIZED CONVULSIVE (H): Primary | ICD-10-CM

## 2019-04-24 DIAGNOSIS — G40.901 NONINTRACTABLE EPILEPSY WITH STATUS EPILEPTICUS, UNSPECIFIED EPILEPSY TYPE (H): ICD-10-CM

## 2019-04-24 PROCEDURE — G0463 HOSPITAL OUTPT CLINIC VISIT: HCPCS | Mod: ZF

## 2019-04-24 RX ORDER — GADOBUTROL 604.72 MG/ML
7.5 INJECTION INTRAVENOUS ONCE
Status: COMPLETED | OUTPATIENT
Start: 2019-04-24 | End: 2019-04-24

## 2019-04-24 RX ADMIN — GADOBUTROL 6 ML: 604.72 INJECTION INTRAVENOUS at 16:00

## 2019-04-24 ASSESSMENT — PAIN SCALES - GENERAL: PAINLEVEL: NO PAIN (0)

## 2019-04-24 NOTE — LETTER
2019       RE: Bobby Howard  [address]  [facility] [address]     Dear Colleague,    Thank you for referring your patient, Bobby Howard, to the [facility] MULTIPLE SCLEROSIS at [facility]. Please see a copy of my visit note below.    MULTIPLE SCLEROSIS CLINIC AT THE [facility]  FOLLOWUP/ESTABLISHED PATIENT VISIT          PRINCIPAL NEUROLOGIC DIAGNOSIS: Deferred        Date of Onset: 2017  Date of Diagnosis: 2017  Initial Clinical Course: Monophasic  Current Clinical Course: Primary Progressive  Past Disease Modifying Therapy(ies): NA  Current Disease Modifying Therapy(ies): NA  Most Recent MRI of the Brain: 3/19/18  Most Recent MRI of the Cervical Cord 18  Most Recent MRI of the Thoracic Cord: 18  Most Recent Lumbar Puncture: 17 + OCBs  Most Recent OCT: NA   Most Recent JCV: NA  Most Recent Remote Hepatitis Panel: 18 negative  Most Recent VZV Ig18 positive  Most Recent TB Quant: 18 positive        CHIEF COMPLAINT: Follow up on DMT      INTERVAL HISTORY:    The patient reportedly had issues with increased confusion during her taper. She report that she accidentally double taking her medication. She reportedly has improved her cognition has improved. She reports that she had been lethargic with the full dose of keppra. She waxes and wanes. She reports that she will totally forget the day prior. She reportedly is also starting to wonder    He standard day is getting up at 6am, drinking coffee and smoking, then lay down. Gets up to smoke goes lays down again. She will eventually get up and eat lunch and dinner    The patient will still occasionally sneak out to have amin.    Issues with current MS therapy: Tolerating DMT without issue    REVIEW OF SYSTEMS:    Mood: unchanged and depressed situational missing her childern  Spasticity:none  Bladder: none  Bowel: unchanged  Pain related to today's visit:reviewed on nursing  "intake documentation  Fatigue: unchanged  Sleep: insomnia , interrupted and lays done at 8pm and weakes up at 6am  Memory/Concentration: as above.    Mild weight loss, night sweats.       Otherwise 10 point ROS was neg other than the symptoms noted above.    PAST HISTORY was reviewed and updated:      MEDICATIONS and ALLERGIES were reviewed and updated.    SOCIAL HISTORY was reviewed and updated:        EXAM:    PHYSICAL EXAMINATION:   VITAL SIGNS:  Vital signs:      BP: 119/76 Pulse: 86     SpO2: 97 %       Weight: 55.8 kg (123 lb)  Estimated body mass index is 22.14 kg/m  as calculated from the following:    Height as of 18: 1.588 m (5' 2.5\").    Weight as of this encounter: 55.8 kg (123 lb).            GENERAL: The patient is a well-nourished  who presents to the evaluation with her step mom and dad.  NEUROLOGIC:   MENTAL STATUS: Oriented to:  person, place, month and year, Language:  Intact fluency, comprehension, reading, writing, naming and repetition , Memory:  Registered 3 out of 3 and 1 out of 3 on delayed recall, Fund of Knowledge:  poor fund of knowledge, Caclulation:  intact, Praxis:  intact, Executive function:  impaired and Minico out of 5 only remember one word without clues, During drawing the clock she reji the clock and said that she need to put the time to ten past 11 and she set the time 11:05. thought debbie was the president. Did not know the day of week or date. Could not interput idioms. Had only concreate thinking. Has poor insight into her codition. Could do serial sentences.       Visual fields are full to confrontation. The pupils are  round and react to light and there is no Jovanny Elise pupil.  Extraocular movements are  intact with no  internuclear ophthalmoplegia. No nystagmus. Facial strength and sensation are  normal. Hearing is  normal. Palate elevation and tongue protrusion are  normal.   POWER:      Motor     Upper         Right Left   Shoulder Abduction 5 5   Elbow " Flexion 5 5   Elbow Extension 5 5   Wrist Extension 5 5   Digit Extension 5 5   Digit Flexion 5 5   APB 5 5   Tone 0 0   Lower          Right Left   Hip Flexion 5 5   Knee Extension 5 5   Knee Flexion 5 5   Foot Dorsiflexion 5 5   Foot Plantar Flexion 5 5   EH 5 5   Toe Flexion 5 5   Tone 0 0             Grade Description   0 No increase in muscle tone   1 Slight increase in muscle tone, manifested by a catch and release or by minimal resistance at the end of the range of motion when the affected part(s) is moved in flexion or extension   1+ Slight increase in muscle tone, manifested by a catch, followed by minimal resistance throughout the remainder (less than half) of the ROM   2 More marked increase in muscle tone through most of the ROM, but affected part(s) easily moved   3 Considerable increase in muscle tone, passive movement difficult   4 Affected part(s) rigid in flexion or extension               SENSORY:      Light touch:  Intact in all extremities        MOTOR/CEREBELLAR:     Right Left   RRM 0 Normal 0 Normal   DANNY 0 Normal 0 Normal   FTN 0 Normal 0 Normal   RRM 1 Abnormal 0 Normal   HKS 0 Normal 0 Normal               GAIT: Gait is   narrow-based and steady and the patient is  able to walk on heels, toes and in tandem without difficulty.    Romberg: Stable with eye(s) closed                      \  RESULTS:  Monitoring labs:    Orders Only on 11/06/2018   Component Date Value Ref Range Status     WBC 11/06/2018 8.9  4.0 - 11.0 10e9/L Final     RBC Count 11/06/2018 3.87  3.8 - 5.2 10e12/L Final     Hemoglobin 11/06/2018 12.5  11.7 - 15.7 g/dL Final     Hematocrit 11/06/2018 36.6  35.0 - 47.0 % Final     MCV 11/06/2018 95  78 - 100 fl Final     MCH 11/06/2018 32.3  26.5 - 33.0 pg Final     MCHC 11/06/2018 34.2  31.5 - 36.5 g/dL Final     RDW 11/06/2018 11.5  10.0 - 15.0 % Final     Platelet Count 11/06/2018 189  150 - 450 10e9/L Final     Diff Method 11/06/2018 Automated Method   Final     % Neutrophils  11/06/2018 75.8  % Final     % Lymphocytes 11/06/2018 14.7  % Final     % Monocytes 11/06/2018 7.4  % Final     % Eosinophils 11/06/2018 1.5  % Final     % Basophils 11/06/2018 0.3  % Final     % Immature Granulocytes 11/06/2018 0.3  % Final     Nucleated RBCs 11/06/2018 0  0 /100 Final     Absolute Neutrophil 11/06/2018 6.7  1.6 - 8.3 10e9/L Final     Absolute Lymphocytes 11/06/2018 1.3  0.8 - 5.3 10e9/L Final     Absolute Monocytes 11/06/2018 0.7  0.0 - 1.3 10e9/L Final     Absolute Eosinophils 11/06/2018 0.1  0.0 - 0.7 10e9/L Final     Absolute Basophils 11/06/2018 0.0  0.0 - 0.2 10e9/L Final     Abs Immature Granulocytes 11/06/2018 0.0  0 - 0.4 10e9/L Final     Absolute Nucleated RBC 11/06/2018 0.0   Final     Sodium 11/06/2018 145* 133 - 144 mmol/L Final     Potassium 11/06/2018 3.2* 3.4 - 5.3 mmol/L Final     Chloride 11/06/2018 112* 94 - 109 mmol/L Final     Carbon Dioxide 11/06/2018 23  20 - 32 mmol/L Final     Anion Gap 11/06/2018 9  3 - 14 mmol/L Final     Glucose 11/06/2018 95  70 - 99 mg/dL Final     Urea Nitrogen 11/06/2018 11  7 - 30 mg/dL Final     Creatinine 11/06/2018 0.72  0.52 - 1.04 mg/dL Final     GFR Estimate 11/06/2018 >90  >60 mL/min/1.7m2 Final     GFR Estimate If Black 11/06/2018 >90  >60 mL/min/1.7m2 Final     Calcium 11/06/2018 8.5  8.5 - 10.1 mg/dL Final     Bilirubin Total 11/06/2018 0.2  0.2 - 1.3 mg/dL Final     Albumin 11/06/2018 3.8  3.4 - 5.0 g/dL Final     Protein Total 11/06/2018 6.9  6.8 - 8.8 g/dL Final     Alkaline Phosphatase 11/06/2018 74  40 - 150 U/L Final     ALT 11/06/2018 16  0 - 50 U/L Final     AST 11/06/2018 9  0 - 45 U/L Final   ]      MRI brain:    MRI Dated 4/24/19:  Severe T2 disease burden with  0 enhancion lesion(s).  compared to MRI on 7/31/18 there are   0 new T2 lesion(s).    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  Overall, the patient has not greatly improved since last time I seen her.  The patient has had significant damage to her frontal lobes from her acute illness back in 2017.  Overall, I think the likelihood that she will regain much more of her cognitive function is very low.  The patient has very little insight into her illness.  She also has difficulty with executive function task.  She also has difficulty with impulse control.  These behavioral abnormalities can be fairly clearly explained by the extensive damage that she has had.  While we could still repeat formal neurocognitive testing, I sincerely doubt there has been much change since her testing done on 8/16/18. I think that the patient probably does not have capacity to care for herself.  I also think given her poor insight and ability to think through actions, that I do not think the patient is employable at all.  I think the patient would be an appropriate candidate for guardianship.  I also think that the patient.  While the patient can clearly not caring for herself, I do hope that she will be able to have visitation with her children.  I do not fully know the social situation so it is difficult to comment on this.  However, I think that depending on the social situation that supervised visits may be reasonable.  However, this solely depends on the social situation and how intermittent visits with her mother may affect the children.  I will follow up the patient's blood work today.  We are still awaiting the results of the EEG.  Depending on what those results show, I may consider re-dosing the patient with an immunosuppressant.  I may also consider either increasing or decreasing her seizure medications depending on the results of the EEG.  Overall, if the patient remains stable, then I will follow her up in 6 months.  If patient has problems, then I will see her sooner.  I instructed the family to  call my office with any questions, concerns, issues or problems.    Follow up in 6 months  Will contact the family after the results of the blood work and eeg come back    I spent 45 minutes with the patient, greater than 50% in counseling and coordination of care.    (Chart documentation was completed in part with Dragon voice-recognition software. Even though reviewed, some grammatical, spelling, and word errors may remain.)       Again, thank you for allowing me to participate in the care of your patient.      Sincerely,    Gurinder Edouard MD

## 2019-04-24 NOTE — PROGRESS NOTES
MULTIPLE SCLEROSIS CLINIC AT THE [facility]  FOLLOWUP/ESTABLISHED PATIENT VISIT          PRINCIPAL NEUROLOGIC DIAGNOSIS: Deferred        Date of Onset: 2017  Date of Diagnosis: 2017  Initial Clinical Course: Monophasic  Current Clinical Course: Primary Progressive  Past Disease Modifying Therapy(ies): NA  Current Disease Modifying Therapy(ies): NA  Most Recent MRI of the Brain: 3/19/18  Most Recent MRI of the Cervical Cord 18  Most Recent MRI of the Thoracic Cord: 18  Most Recent Lumbar Puncture: 17 + OCBs  Most Recent OCT: NA   Most Recent JCV: NA  Most Recent Remote Hepatitis Panel: 18 negative  Most Recent VZV Ig18 positive  Most Recent TB Quant: 18 positive        CHIEF COMPLAINT: Follow up on DMT      INTERVAL HISTORY:    The patient reportedly had issues with increased confusion during her taper. She report that she accidentally double taking her medication. She reportedly has improved her cognition has improved. She reports that she had been lethargic with the full dose of keppra. She waxes and wanes. She reports that she will totally forget the day prior. She reportedly is also starting to wonder    He standard day is getting up at 6am, drinking coffee and smoking, then lay down. Gets up to smoke goes lays down again. She will eventually get up and eat lunch and dinner    The patient will still occasionally sneak out to have amin.    Issues with current MS therapy: Tolerating DMT without issue    REVIEW OF SYSTEMS:    Mood: unchanged and depressed situational missing her childern  Spasticity:none  Bladder: none  Bowel: unchanged  Pain related to today's visit:reviewed on nursing intake documentation  Fatigue: unchanged  Sleep: insomnia , interrupted and lays done at 8pm and weakes up at 6am  Memory/Concentration: as above.    Mild weight loss, night sweats.       Otherwise 10 point ROS was neg other than the symptoms noted above.    PAST HISTORY was  "reviewed and updated:      MEDICATIONS and ALLERGIES were reviewed and updated.    SOCIAL HISTORY was reviewed and updated:        EXAM:    PHYSICAL EXAMINATION:   VITAL SIGNS:  Vital signs:      BP: 119/76 Pulse: 86     SpO2: 97 %       Weight: 55.8 kg (123 lb)  Estimated body mass index is 22.14 kg/m  as calculated from the following:    Height as of 18: 1.588 m (5' 2.5\").    Weight as of this encounter: 55.8 kg (123 lb).            GENERAL: The patient is a well-nourished  who presents to the evaluation with her step mom and dad.  NEUROLOGIC:   MENTAL STATUS: Oriented to:  person, place, month and year, Language:  Intact fluency, comprehension, reading, writing, naming and repetition , Memory:  Registered 3 out of 3 and 1 out of 3 on delayed recall, Fund of Knowledge:  poor fund of knowledge, Caclulation:  intact, Praxis:  intact, Executive function:  impaired and Minico out of 5 only remember one word without clues, During drawing the clock she reji the clock and said that she need to put the time to ten past 11 and she set the time 11:05. thought debbie was the president. Did not know the day of week or date. Could not interput idioms. Had only concreate thinking. Has poor insight into her codition. Could do serial sentences.       Visual fields are full to confrontation. The pupils are  round and react to light and there is no Jovanny Elise pupil.  Extraocular movements are  intact with no  internuclear ophthalmoplegia. No nystagmus. Facial strength and sensation are  normal. Hearing is  normal. Palate elevation and tongue protrusion are  normal.   POWER:      Motor     Upper         Right Left   Shoulder Abduction 5 5   Elbow Flexion 5 5   Elbow Extension 5 5   Wrist Extension 5 5   Digit Extension 5 5   Digit Flexion 5 5   APB 5 5   Tone 0 0   Lower          Right Left   Hip Flexion 5 5   Knee Extension 5 5   Knee Flexion 5 5   Foot Dorsiflexion 5 5   Foot Plantar Flexion 5 5   EH 5 5   Toe Flexion 5 5 "   Tone 0 0             Grade Description   0 No increase in muscle tone   1 Slight increase in muscle tone, manifested by a catch and release or by minimal resistance at the end of the range of motion when the affected part(s) is moved in flexion or extension   1+ Slight increase in muscle tone, manifested by a catch, followed by minimal resistance throughout the remainder (less than half) of the ROM   2 More marked increase in muscle tone through most of the ROM, but affected part(s) easily moved   3 Considerable increase in muscle tone, passive movement difficult   4 Affected part(s) rigid in flexion or extension               SENSORY:      Light touch:  Intact in all extremities        MOTOR/CEREBELLAR:     Right Left   RRM 0 Normal 0 Normal   DANNY 0 Normal 0 Normal   FTN 0 Normal 0 Normal   RRM 1 Abnormal 0 Normal   HKS 0 Normal 0 Normal               GAIT: Gait is   narrow-based and steady and the patient is  able to walk on heels, toes and in tandem without difficulty.    Romberg: Stable with eye(s) closed                      \  RESULTS:  Monitoring labs:    Orders Only on 11/06/2018   Component Date Value Ref Range Status     WBC 11/06/2018 8.9  4.0 - 11.0 10e9/L Final     RBC Count 11/06/2018 3.87  3.8 - 5.2 10e12/L Final     Hemoglobin 11/06/2018 12.5  11.7 - 15.7 g/dL Final     Hematocrit 11/06/2018 36.6  35.0 - 47.0 % Final     MCV 11/06/2018 95  78 - 100 fl Final     MCH 11/06/2018 32.3  26.5 - 33.0 pg Final     MCHC 11/06/2018 34.2  31.5 - 36.5 g/dL Final     RDW 11/06/2018 11.5  10.0 - 15.0 % Final     Platelet Count 11/06/2018 189  150 - 450 10e9/L Final     Diff Method 11/06/2018 Automated Method   Final     % Neutrophils 11/06/2018 75.8  % Final     % Lymphocytes 11/06/2018 14.7  % Final     % Monocytes 11/06/2018 7.4  % Final     % Eosinophils 11/06/2018 1.5  % Final     % Basophils 11/06/2018 0.3  % Final     % Immature Granulocytes 11/06/2018 0.3  % Final     Nucleated RBCs 11/06/2018 0  0 /100  Final     Absolute Neutrophil 11/06/2018 6.7  1.6 - 8.3 10e9/L Final     Absolute Lymphocytes 11/06/2018 1.3  0.8 - 5.3 10e9/L Final     Absolute Monocytes 11/06/2018 0.7  0.0 - 1.3 10e9/L Final     Absolute Eosinophils 11/06/2018 0.1  0.0 - 0.7 10e9/L Final     Absolute Basophils 11/06/2018 0.0  0.0 - 0.2 10e9/L Final     Abs Immature Granulocytes 11/06/2018 0.0  0 - 0.4 10e9/L Final     Absolute Nucleated RBC 11/06/2018 0.0   Final     Sodium 11/06/2018 145* 133 - 144 mmol/L Final     Potassium 11/06/2018 3.2* 3.4 - 5.3 mmol/L Final     Chloride 11/06/2018 112* 94 - 109 mmol/L Final     Carbon Dioxide 11/06/2018 23  20 - 32 mmol/L Final     Anion Gap 11/06/2018 9  3 - 14 mmol/L Final     Glucose 11/06/2018 95  70 - 99 mg/dL Final     Urea Nitrogen 11/06/2018 11  7 - 30 mg/dL Final     Creatinine 11/06/2018 0.72  0.52 - 1.04 mg/dL Final     GFR Estimate 11/06/2018 >90  >60 mL/min/1.7m2 Final     GFR Estimate If Black 11/06/2018 >90  >60 mL/min/1.7m2 Final     Calcium 11/06/2018 8.5  8.5 - 10.1 mg/dL Final     Bilirubin Total 11/06/2018 0.2  0.2 - 1.3 mg/dL Final     Albumin 11/06/2018 3.8  3.4 - 5.0 g/dL Final     Protein Total 11/06/2018 6.9  6.8 - 8.8 g/dL Final     Alkaline Phosphatase 11/06/2018 74  40 - 150 U/L Final     ALT 11/06/2018 16  0 - 50 U/L Final     AST 11/06/2018 9  0 - 45 U/L Final   ]      MRI brain:    MRI Dated 4/24/19:  Severe T2 disease burden with  0 enhancion lesion(s).  compared to MRI on 7/31/18 there are   0 new T2 lesion(s).    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  Overall, the patient has not greatly improved since last time I seen her.  The patient has had significant damage to her frontal lobes from her acute illness back in 2017.  Overall, I think the likelihood that she will regain much  more of her cognitive function is very low.  The patient has very little insight into her illness.  She also has difficulty with executive function task.  She also has difficulty with impulse control.  These behavioral abnormalities can be fairly clearly explained by the extensive damage that she has had.  While we could still repeat formal neurocognitive testing, I sincerely doubt there has been much change since her testing done on 8/16/18. I think that the patient probably does not have capacity to care for herself.  I also think given her poor insight and ability to think through actions, that I do not think the patient is employable at all.  I think the patient would be an appropriate candidate for guardianship.  I also think that the patient.  While the patient can clearly not caring for herself, I do hope that she will be able to have visitation with her children.  I do not fully know the social situation so it is difficult to comment on this.  However, I think that depending on the social situation that supervised visits may be reasonable.  However, this solely depends on the social situation and how intermittent visits with her mother may affect the children.  I will follow up the patient's blood work today.  We are still awaiting the results of the EEG.  Depending on what those results show, I may consider re-dosing the patient with an immunosuppressant.  I may also consider either increasing or decreasing her seizure medications depending on the results of the EEG.  Overall, if the patient remains stable, then I will follow her up in 6 months.  If patient has problems, then I will see her sooner.  I instructed the family to call my office with any questions, concerns, issues or problems.    Follow up in 6 months  Will contact the family after the results of the blood work and eeg come back    I spent 45 minutes with the patient, greater than 50% in counseling and coordination of care.      Gurinder BEAR  MD [facility]  Staff Neurologist   04/24/19       (Chart documentation was completed in part with Dragon voice-recognition software. Even though reviewed, some grammatical, spelling, and word errors may remain.)

## 2019-04-24 NOTE — NURSING NOTE
Chief Complaint   Patient presents with     RECHECK     [facility] RETURN MS 4 WK        Jolie Mancuso, EMT

## 2019-04-24 NOTE — PATIENT INSTRUCTIONS
Blood work today    Follow up in 6 months    You saw Dr Gurinder Edouard, today at the [facility] in [place].  In order to get a message to Dr. Edouard, please call [phone].  Please call if you have any of the following symptoms:    New or worsening neurologic symptoms that persist for 24-48 hours, such as:  o New onset of pain or marked worsening of pain  o Difficulty with speech, swallowing, or breathing  o New onset of vertigo or dizziness  o Change in bowel or bladder function (incontinence, difficulty urinating)    Increasing difficulty in self care    Marked changes in vision (double vision, blurred vision, graying of vision)    Change in mobility    Change in cognitive function    Falling    Worsening numbness, tingling or pain with a change in function    Worsening fatigue lasting more than 2 weeks  If you had labs completed today, we will contact you with the results.  If you are active in [email or web address], they will be released to you there.  Otherwise, your results will be provided to you via mail or telephone call.  Some results take up to 2 weeks for completion.  If you haven t heard anything about your lab results within 2 weeks, please call [phone] or send a [email or web address] message to obtain your results.  If you have an MRI scheduled prior to your next appointment with Dr. Edouard, he will provide you with the results at your scheduled follow up appointment.  If you are not scheduled to see Dr. Edouard within 2 weeks after your MRI, please call or send a [email or web address] message to obtain your results.  Please be aware that it takes at least 5 business days after routine MRIs for your results to be reviewed by both the radiologist and Dr. Edouard.  Prescription refills should be faxed to us by your pharmacy.  Our fax number for prescription refills is [phone].  Please do your best to come to your appointments, and to arrive 10 minutes early to allow time for checking in.   [facility] reserves the right to terminate care of established patients if a patient misses three or more appointments in a clinic without providing notification within a 12-month period.    Developing Your Care Team  Individuals living with chronic illnesses like MS may be unaware that they are at risk for the same range of medical problems as everyone else.  This is why you must establish a relationship with other health care providers in addition to your Multiple Sclerosis doctor.  It can be difficult at times to figure out whether a health concern is related to your MS, or whether it is related to something else, such as hormonal changes, pseduoexacerbations, changes in your core body temperature, flu-like reactions to interferons, exercise, or infections.  Urinary tract infections are common culprits that can cause fatigue, weakness, or other  MS attack -like symptoms without classic symptoms of a UTI.  For this reason, if you call or come in to discuss symptoms, you may be asked to get in touch with your primary provider or another specialist, so that you receive the comprehensive care you need.  What is Multiple Sclerosis (MS)?  MS is a disease in which the nerve tissues in the brain and/or spinal cord are attacked by immune cells in the body.  These immune cells are present in everyone, and their normal role is to fight off infections.  In people with MS, these cells change the way they function and cross into the nervous system.  Once there, they cause inflammation that damages the myelin (or the protective coating of a nerve cell, much like the plastic covering on an electrical cord) and parts of the nerve cell itself.  So far, a clear cause for this immune system dysfunction has not been found.  MS often starts out as the  relapsing-remitting  form.  This means there are episodes when you have symptoms, and other times when you recover to normal or near-normal.  Over time, if the  damage to the nervous system continues, the disease can cause additional disability, such as difficulty walking.  If the relapses and nerve damage can be prevented with available medications, many patients with MS can go many years between relapses and have relatively little disability.  Remember: MS is a condition that changes and must be evaluated on an ongoing basis!  What is a Relapse? (Also called flare-ups, attacks, or exacerbations)  Relapses are due to the occurrence of inflammation in some part of the brain and spinal cord.  A relapse is new or recurrent symptoms which persist for at least 24 hours and sometimes worsen over 48 hours.  New symptoms need to be  by at least 1 month in order to be considered separate relapses. Most of the time, symptoms reach their maximal intensity within 2 weeks and then begin to slowly resolve.  At times, your symptoms may not recover fully for up to 6 months, depending on the severity of the episode.  The frequency of relapses is generally higher early in the disease, but can vary greatly among individuals with MS.  Improvement of symptoms for an individual is unpredictable with each relapse.    It is important to remember than an increase in symptoms and changes in function may not necessarily be a relapse.  There are other factors that contribute to such changes, such as hot weather, increased body temperature, infection, and stress.  The worsening of symptoms may look like a relapse when in reality it is not.  These episodes are referred to as pseudorelapses.  Once the underlying cause is addressed, symptoms usually fade away and you feel better.  If you experience a worsening of symptoms that lasts more than 48 hours and does not improve with cooling down, decreasing stress, or treatment of an infection, please call us and we can help to better determine whether you are having a pseudorelapse versus a relapse.

## 2019-04-24 NOTE — DISCHARGE INSTRUCTIONS
MRI Contrast Discharge Instructions    The IV contrast you received today will pass out of your body in your  urine. This will happen in the next 24 hours. You will not feel this process.  Your urine will not change color.    Drink at least 4 extra glasses of water or juice today (unless your doctor  has restricted your fluids). This reduces the stress on your kidneys.  You may take your regular medicines.    If you are on dialysis: It is best to have dialysis today.    If you have a reaction: Most reactions happen right away. If you have  any new symptoms after leaving the hospital (such as hives or swelling),  call your hospital at the correct number below. Or call your family doctor.  If you have breathing distress or wheezing, call 911.    Special instructions: ***    I have read and understand the above information.    Signature:______________________________________ Date:___________    Staff:__________________________________________ Date:___________     Time:__________    [facility] Departments:    ___[place]: [phone]  ___[facility]: [phone]  ___[place]: [phone] ___[facility]: [phone]  ___[facility]: [phone]  ___[facility]: [phone]  ___[phone]  ___[facility]: [phone]  ___[phone]

## 2019-04-25 NOTE — PROCEDURES
[facility] EEG #:       RE: Bobby Howard   MRN: [id number]   : 1990     DATE OF RECORDING 2019   DURATION OF RECORDIN minutes.      CLINICAL SUMMARY:  This routine outpatient EEG recording was performed in evaluation of encephalopathy and grand mal seizures in Bobby Howard, using 23 scalp electrodes placed in the 10-20 system.  She was reported to be receiving lamotrigine and levetiracetam at the time of this recording.      EEG ACTIVITIES DURING WAKING AND DROWSINESS:  During waking there was a bilateral 10 Hz posterior dominant rhythm, which was better sustained on the right.  Generalized 4-8 Hz theta activities were intermixed with faster frequencies during waking.  Drowsiness was manifested by increased rhythmicity of background theta activities with slow lateral eye movements and dropout of alpha frequencies.    During waking and drowsiness, there were occasional left frontotemporal polymorphic 2-4 Hz delta activities.    Photic stimulation resulted in bilateral driving at several frequencies of stimulation.  Hyperventilation did not result in any definite response.    During drowsiness there were frequent wicket waveforms bilaterally.      No interictal epileptiform abnormalities and no electrographic seizures were recorded.      IMPRESSION:  This was an abnormal waking and drowsy EEG recording due to generalized theta slowing during waking with occasional left frontotemporal delta slowing in waking and drowsiness.    No interictal epileptiform abnormalities and no electrographic seizures were recorded.    These findings indicate mild generalized electrocerebral dysfunction with additional left frontotemporal neuronal dysfunction, which are etiologically nonspecific findings.  Clinical correlation is recommended.  Roosevelt Díaz M.D., Professor of Neurology         D: 2019   T: 2019   MT: PREM      Name:     BOBBY HOWARD   MRN:       [id number]        Account:        [id number]   :      1990           Procedure Date: 2019      Document: [id number]

## 2019-04-26 ENCOUNTER — TELEPHONE (OUTPATIENT)
Dept: NEUROLOGY | Facility: CLINIC | Age: 29
End: 2019-04-26

## 2019-04-26 DIAGNOSIS — G93.40 ENCEPHALOPATHY: Primary | ICD-10-CM

## 2019-04-26 NOTE — TELEPHONE ENCOUNTER
Spoke with Daylin and we discussed Dr. Eduoard's input below. She reports understanding of plan with med dosing and future EEGs.   Clinic note and medication instructions placed in mail to patient's home per request. Daylin will call us with any questions or concerns.

## 2019-04-26 NOTE — TELEPHONE ENCOUNTER
Health Call Center    Phone Message    May a detailed message be left on voicemail: yes    Reason for Call: Other: Daylin called to speak with Dr. Edouard to get the results of Bobby's EEG, and also request a copy of the notes he made stating that Bobby is 100% disabled. They have an appointment with the Social Security office on Tuesday for her to apply for disability, and they are needing it before then.     Action Taken: Message routed to:  [facility] Neurology

## 2019-04-26 NOTE — TELEPHONE ENCOUNTER
Hi    The eeg agreed with the findings of the MRI. That she has frontal brain damage. I think we can try to increase the lamictal to 150mg in the am and 200mg in the pm.  After one week, let continue to down titrate the total daily dose of keppra by 250mg every 2 weeks as tolerated, with the plan to get another EEG when she 250mg twice daily and when she is off.She can get these done locally as long as they are faxed to us.   I would advise them that it is important that she does not get pregnant while on this medication. Also what size pills does she have.    Sincerely  Gurinder Edouard MD [facility]  Staff Neurologist   04/26/19

## 2019-06-08 DIAGNOSIS — F19.20: ICD-10-CM

## 2019-06-10 RX ORDER — THIAMINE MONONITRATE (VIT B1) 100 MG
TABLET ORAL
Qty: 14 TABLET | Refills: 0 | Status: SHIPPED | OUTPATIENT
Start: 2019-06-10 | End: 2019-06-24

## 2019-06-10 NOTE — TELEPHONE ENCOUNTER
Called and spoke to patient's mother, Daylin. Patient medication will be manage by Dr. Edouard in Neurology for the folic acid and B12. Decline scheduling in Primary Care.

## 2019-06-10 NOTE — TELEPHONE ENCOUNTER
folic acid (FOLVITE) 1 MG tablet      Last Written Prescription Date:  4/20/19  Last Fill Quantity: 14,   # refills: 0  Last Office Visit : 2/27/18  Future Office visit:  none  Pended.    Routing refill request to provider for review/approval because:  4th requests. Appointment needed.    vitamin B1 (THIAMINE) 100 MG tablet   Last Written Prescription Date:  4/20/19  Last Fill Quantity: 30,   # refills: 0  14 day refill.     Routing because:  3rd requests. Appointment needed.

## 2019-06-12 RX ORDER — FOLIC ACID 1 MG/1
TABLET ORAL
OUTPATIENT
Start: 2019-06-12

## 2019-06-13 DIAGNOSIS — F19.20: ICD-10-CM

## 2019-06-17 RX ORDER — FOLIC ACID 1 MG/1
1000 TABLET ORAL DAILY
Qty: 30 TABLET | Refills: 0 | OUTPATIENT
Start: 2019-06-17

## 2019-06-17 NOTE — TELEPHONE ENCOUNTER
We received a refill request for folic acid from the patient's pharmacy; This has been not been previously prescribed by Dr. Edouard; Rx refused and advised pharmacy to contact ordering provider's office.    Rachel Awad MS RN Care Coordinator

## 2019-06-21 ENCOUNTER — TRANSFERRED RECORDS (OUTPATIENT)
Dept: HEALTH INFORMATION MANAGEMENT | Facility: CLINIC | Age: 29
End: 2019-06-21

## 2019-06-24 ENCOUNTER — TELEPHONE (OUTPATIENT)
Dept: NEUROLOGY | Facility: CLINIC | Age: 29
End: 2019-06-24

## 2019-06-24 DIAGNOSIS — F19.20: ICD-10-CM

## 2019-06-24 RX ORDER — FOLIC ACID 1 MG/1
1000 TABLET ORAL DAILY
Qty: 30 TABLET | Refills: 11 | Status: SHIPPED | OUTPATIENT
Start: 2019-06-24 | End: 2020-06-23

## 2019-06-24 RX ORDER — LANOLIN ALCOHOL/MO/W.PET/CERES
100 CREAM (GRAM) TOPICAL DAILY
Qty: 30 TABLET | Refills: 11 | Status: SHIPPED | OUTPATIENT
Start: 2019-06-24

## 2019-06-24 NOTE — TELEPHONE ENCOUNTER
Health Call Center    Phone Message    May a detailed message be left on voicemail: yes    Reason for Call: Other: Daylin returning Rachel's call.  She truly apologizes for missing it and is available anytime for a call back     Action Taken: Message routed to:  [facility] Neurology

## 2019-06-24 NOTE — TELEPHONE ENCOUNTER
Daylin said that Bobby is doing good; When do you want the repeat EEG done? How long after stopping the Keppra? Also, are you willing to take over the folic acid and Vitamin B1 prescriptions? Thank you.    Rachel Awad, MS RN Care Coordinator

## 2019-06-24 NOTE — TELEPHONE ENCOUNTER
[facility] Call Center    Phone Message    May a detailed message be left on voicemail: yes    Reason for Call: Other: Daylin calling to request a call back. She has some questions on pts VITAMIN B-1 100 MG tablet. Please call her back.      Action Taken: Message routed to:  [facility]

## 2019-06-24 NOTE — TELEPHONE ENCOUNTER
Hi    The EEG was suggestive of encephalopathy. I would still repeat it. How is she doing otherwise.    Sincerely  Gurinder Edouard MD [facility]  Staff Neurologist   06/24/19

## 2019-06-24 NOTE — TELEPHONE ENCOUNTER
Daylin called with questions about patient's Vitamin B1, however, it looks like that has been previously ordered by Dr. Peralta; I left [facility] for Daylin asking her to give us a call back and let the call center know when I could reach her back either later today or tomorrow sometime.    Rachel Awad, MS RN Care Coordinator

## 2019-06-24 NOTE — TELEPHONE ENCOUNTER
I called Daylin and let her know Dr. Edouard's input and that the prescriptions have been sent to the pharmacy; Daylin would like the new EEG order faxed over to [facility] (phone [phone] fax [phone]) again for that to be completed mid-July; Technically, two weeks out from stopping the Keppra will land on a Monday, however, they only do EEGs on Friday mornings, which I told Daylin was fine; Dr. Edouard, please order which EEG you could like completed, then I can get that faxed over for the patient; Thank you.    Rachel Awad, MS RN Care Coordinator

## 2019-06-24 NOTE — TELEPHONE ENCOUNTER
I called Daylin back and was able to reach her; She said that Dr. Peralta who previously prescribed the folic acid and Vitamin B1 for Bobby is no longer her PCP; She is wondering if Dr. Edouard is willing to take over both of those prescriptions for her; She understands that they are available OTC, however, their [facility] does not have it available OTC; I confirmed the dosing in Bobby' chart as well for both of those; Dr. Edouard, are you willing to take those over? Daylin also wanted to update Dr. Edouard that she had her EEG done on Friday, which we have not received the results back yet; She said that Bobby is down to Keppra 250mg daily and will be done on Sunday; Daylin said that Dr. Edouard wanted to do another EEG after the Keppra was complete, but given this EEG is not back yet, she wasn't sure what timing he wanted for that; Dr. Edouard, when do you want the next EEG after she is done with the Keppra on Sunday? Bobby is also on lamotrigine 150mg in the morning and 200mg in the evening and tolerating that well; Will route to Dr. Edouard for input.    Rachel Awad, MS RN Care Coordinator

## 2019-06-24 NOTE — TELEPHONE ENCOUNTER
Hi    I am okay taking it over. 2 weeks after.    Thanks.    Gurinder Edouard MD [facility]  Staff Neurologist   06/24/19

## 2019-06-25 NOTE — TELEPHONE ENCOUNTER
Hi    EEG is ordered. It is a 3 hour eeg    Thanks    Gurinder Edouard MD [facility]  Staff Neurologist   06/25/19

## 2019-07-01 ENCOUNTER — TELEPHONE (OUTPATIENT)
Dept: NEUROLOGY | Facility: CLINIC | Age: 29
End: 2019-07-01

## 2019-07-01 NOTE — TELEPHONE ENCOUNTER
EEG scheduling at [facility] wanted to talk with us because Bobby is scheduled for the EEG on 7/19/19 for after stopping the Keppra; The patient takes Lamictal twice daily, so they are wondering if she is supposed to hold her night before and morning of dose; I told her that I would double check with Dr. Edouard, but that I didn't think he would necessarily want the Lamictal stopped, as this EEG is being done to see how the patient is doing after having stopped the Keppra; They would like me to call them back again once I hear from Dr. Edouard.    Rachel Awad, MS RN Care Coordinator

## 2019-07-01 NOTE — TELEPHONE ENCOUNTER
I called medical records at [facility] and left VMM asking for the EEG results from 6/21/19 to either be faxed to us at [phone], or if the results are still pending, to call me back and let me know that status update.    Rachel Awad, MS RN Care Coordinator'

## 2019-07-01 NOTE — TELEPHONE ENCOUNTER
[facility] Call Center    Phone Message    May a detailed message be left on voicemail: yes    Reason for Call: Medication Question or concern regarding medication   Prescription Clarification  Name of Medication: Seizure Medication   Prescribing Provider: Dr. Edouard   What on the order needs clarification? Needs to discuss seizure medication prior to EEG scheduled on 7/19. Per [place], Pt takes medication at 7:30a and 7:30p each day. Please call to discuss at [phone].    Action Taken: Message routed to:  [facility] Neurology Clinic

## 2019-07-02 ENCOUNTER — TRANSFERRED RECORDS (OUTPATIENT)
Dept: HEALTH INFORMATION MANAGEMENT | Facility: CLINIC | Age: 29
End: 2019-07-02

## 2019-07-02 NOTE — TELEPHONE ENCOUNTER
Patient's EEG results from 6/21/19 are scanned in the media tab.    Rachel Awad MS RN Care Coordinator

## 2019-07-03 ENCOUNTER — TRANSFERRED RECORDS (OUTPATIENT)
Dept: HEALTH INFORMATION MANAGEMENT | Facility: CLINIC | Age: 29
End: 2019-07-03

## 2020-09-02 NOTE — TELEPHONE ENCOUNTER
I called EEG scheduling and advised that the EEG should be cancelled per Dr. Edouard.    Rachel Awad, MS RN Care Coordinator     Yes
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
## 2023-04-27 NOTE — TELEPHONE ENCOUNTER
Dr. Edouard, please put in an order for what you want with regards to rectal diazepam, then I can call it in to the pharmacy; Thank you.    Rachel Awad, MS RN Care Coordinator     From: Jesi Pelletier  To: Josiah Slaughter  Sent: 4/26/2023 6:32 PM CDT  Subject: Rash     Hi, I wanted to ask about a rash I have developed. I thought it might have been from the medication that I was out on for the EOE but I have been off the medication for a week now and it has not gone away. It started about 4 weeks ago, was only on my eyelids for a day then went away. It then came back on my face and went away a couple of days later. It went away again and last week it came back and is on my eyes, mouth and neck and has not gone away. Is there something you can recommend to try or should I come in to have it looked at?
